# Patient Record
Sex: FEMALE | Race: WHITE | HISPANIC OR LATINO | Employment: OTHER | URBAN - METROPOLITAN AREA
[De-identification: names, ages, dates, MRNs, and addresses within clinical notes are randomized per-mention and may not be internally consistent; named-entity substitution may affect disease eponyms.]

---

## 2017-03-29 ENCOUNTER — GENERIC CONVERSION - ENCOUNTER (OUTPATIENT)
Dept: OTHER | Facility: OTHER | Age: 72
End: 2017-03-29

## 2017-04-03 ENCOUNTER — LAB CONVERSION - ENCOUNTER (OUTPATIENT)
Dept: OTHER | Facility: OTHER | Age: 72
End: 2017-04-03

## 2017-04-03 LAB
A/G RATIO (HISTORICAL): 1.4 (CALC) (ref 1–2.5)
ALBUMIN SERPL BCP-MCNC: 4.2 G/DL (ref 3.6–5.1)
ALP SERPL-CCNC: 69 U/L (ref 33–130)
ALT SERPL W P-5'-P-CCNC: 10 U/L (ref 6–29)
AST SERPL W P-5'-P-CCNC: 15 U/L (ref 10–35)
BASOPHILS # BLD AUTO: 0.4 %
BASOPHILS # BLD AUTO: 33 CELLS/UL (ref 0–200)
BILIRUB SERPL-MCNC: 0.7 MG/DL (ref 0.2–1.2)
BUN SERPL-MCNC: 12 MG/DL (ref 7–25)
BUN/CREA RATIO (HISTORICAL): NORMAL (CALC) (ref 6–22)
CALCIUM SERPL-MCNC: 9.1 MG/DL (ref 8.6–10.4)
CHLORIDE SERPL-SCNC: 98 MMOL/L (ref 98–110)
CO2 SERPL-SCNC: 30 MMOL/L (ref 20–31)
CREAT SERPL-MCNC: 0.64 MG/DL (ref 0.6–0.93)
DEPRECATED RDW RBC AUTO: 14.7 % (ref 11–15)
EGFR AFRICAN AMERICAN (HISTORICAL): 103 ML/MIN/1.73M2
EGFR-AMERICAN CALC (HISTORICAL): 89 ML/MIN/1.73M2
EOSINOPHIL # BLD AUTO: 0.4 %
EOSINOPHIL # BLD AUTO: 33 CELLS/UL (ref 15–500)
GAMMA GLOBULIN (HISTORICAL): 3.1 G/DL (CALC) (ref 1.9–3.7)
GLUCOSE (HISTORICAL): 89 MG/DL (ref 65–99)
HCT VFR BLD AUTO: 46.4 % (ref 35–45)
HGB BLD-MCNC: 15 G/DL (ref 11.7–15.5)
LYMPHOCYTES # BLD AUTO: 2509 CELLS/UL (ref 850–3900)
LYMPHOCYTES # BLD AUTO: 30.6 %
MCH RBC QN AUTO: 27 PG (ref 27–33)
MCHC RBC AUTO-ENTMCNC: 32.3 G/DL (ref 32–36)
MCV RBC AUTO: 83.9 FL (ref 80–100)
MONOCYTES # BLD AUTO: 394 CELLS/UL (ref 200–950)
MONOCYTES (HISTORICAL): 4.8 %
NEUTROPHILS # BLD AUTO: 5232 CELLS/UL (ref 1500–7800)
NEUTROPHILS # BLD AUTO: 63.8 %
PLATELET # BLD AUTO: 210 THOUSAND/UL (ref 140–400)
PMV BLD AUTO: 8.6 FL (ref 7.5–12.5)
POTASSIUM SERPL-SCNC: 4.5 MMOL/L (ref 3.5–5.3)
RBC # BLD AUTO: 5.53 MILLION/UL (ref 3.8–5.1)
SODIUM SERPL-SCNC: 136 MMOL/L (ref 135–146)
TOTAL PROTEIN (HISTORICAL): 7.3 G/DL (ref 6.1–8.1)
WBC # BLD AUTO: 8.2 THOUSAND/UL (ref 3.8–10.8)

## 2017-04-04 ENCOUNTER — LAB CONVERSION - ENCOUNTER (OUTPATIENT)
Dept: OTHER | Facility: OTHER | Age: 72
End: 2017-04-04

## 2017-04-04 LAB
A/G RATIO (HISTORICAL): 1.4 (CALC) (ref 1–2.5)
ALBUMIN SERPL BCP-MCNC: 4.2 G/DL (ref 3.6–5.1)
ALP SERPL-CCNC: 69 U/L (ref 33–130)
ALT SERPL W P-5'-P-CCNC: 10 U/L (ref 6–29)
AST SERPL W P-5'-P-CCNC: 15 U/L (ref 10–35)
BASOPHILS # BLD AUTO: 0.4 %
BASOPHILS # BLD AUTO: 33 CELLS/UL (ref 0–200)
BILIRUB SERPL-MCNC: 0.7 MG/DL (ref 0.2–1.2)
BUN SERPL-MCNC: 12 MG/DL (ref 7–25)
BUN/CREA RATIO (HISTORICAL): NORMAL (CALC) (ref 6–22)
CALCIUM SERPL-MCNC: 9.1 MG/DL (ref 8.6–10.4)
CARCINOEMBRYONIC ANTIGEN (HISTORICAL): <0.5 NG/ML
CHLORIDE SERPL-SCNC: 98 MMOL/L (ref 98–110)
CO2 SERPL-SCNC: 30 MMOL/L (ref 20–31)
CREAT SERPL-MCNC: 0.64 MG/DL (ref 0.6–0.93)
DEPRECATED RDW RBC AUTO: 14.7 % (ref 11–15)
EGFR AFRICAN AMERICAN (HISTORICAL): 103 ML/MIN/1.73M2
EGFR-AMERICAN CALC (HISTORICAL): 89 ML/MIN/1.73M2
EOSINOPHIL # BLD AUTO: 0.4 %
EOSINOPHIL # BLD AUTO: 33 CELLS/UL (ref 15–500)
GAMMA GLOBULIN (HISTORICAL): 3.1 G/DL (CALC) (ref 1.9–3.7)
GLUCOSE (HISTORICAL): 89 MG/DL (ref 65–99)
HCT VFR BLD AUTO: 46.4 % (ref 35–45)
HGB BLD-MCNC: 15 G/DL (ref 11.7–15.5)
LYMPHOCYTES # BLD AUTO: 2509 CELLS/UL (ref 850–3900)
LYMPHOCYTES # BLD AUTO: 30.6 %
MCH RBC QN AUTO: 27 PG (ref 27–33)
MCHC RBC AUTO-ENTMCNC: 32.3 G/DL (ref 32–36)
MCV RBC AUTO: 83.9 FL (ref 80–100)
MONOCYTES # BLD AUTO: 394 CELLS/UL (ref 200–950)
MONOCYTES (HISTORICAL): 4.8 %
NEUTROPHILS # BLD AUTO: 5232 CELLS/UL (ref 1500–7800)
NEUTROPHILS # BLD AUTO: 63.8 %
PLATELET # BLD AUTO: 210 THOUSAND/UL (ref 140–400)
PMV BLD AUTO: 8.6 FL (ref 7.5–12.5)
POTASSIUM SERPL-SCNC: 4.5 MMOL/L (ref 3.5–5.3)
RBC # BLD AUTO: 5.53 MILLION/UL (ref 3.8–5.1)
SODIUM SERPL-SCNC: 136 MMOL/L (ref 135–146)
TOTAL PROTEIN (HISTORICAL): 7.3 G/DL (ref 6.1–8.1)
WBC # BLD AUTO: 8.2 THOUSAND/UL (ref 3.8–10.8)

## 2017-04-07 ENCOUNTER — TRANSCRIBE ORDERS (OUTPATIENT)
Dept: ADMINISTRATIVE | Facility: HOSPITAL | Age: 72
End: 2017-04-07

## 2017-04-07 ENCOUNTER — ALLSCRIPTS OFFICE VISIT (OUTPATIENT)
Dept: OTHER | Facility: OTHER | Age: 72
End: 2017-04-07

## 2017-04-07 DIAGNOSIS — C18.9 MALIGNANT NEOPLASM OF COLON, UNSPECIFIED PART OF COLON (HCC): Primary | ICD-10-CM

## 2017-05-23 ENCOUNTER — LAB CONVERSION - ENCOUNTER (OUTPATIENT)
Dept: OTHER | Facility: OTHER | Age: 72
End: 2017-05-23

## 2017-05-23 LAB
25(OH)D3 SERPL-MCNC: 54 NG/ML (ref 30–100)
TSH SERPL DL<=0.05 MIU/L-ACNC: 2.63 MIU/L (ref 0.4–4.5)

## 2017-05-26 ENCOUNTER — GENERIC CONVERSION - ENCOUNTER (OUTPATIENT)
Dept: OTHER | Facility: OTHER | Age: 72
End: 2017-05-26

## 2017-06-28 ENCOUNTER — ALLSCRIPTS OFFICE VISIT (OUTPATIENT)
Dept: OTHER | Facility: OTHER | Age: 72
End: 2017-06-28

## 2017-06-28 DIAGNOSIS — Z12.31 ENCOUNTER FOR SCREENING MAMMOGRAM FOR MALIGNANT NEOPLASM OF BREAST: ICD-10-CM

## 2017-06-29 ENCOUNTER — GENERIC CONVERSION - ENCOUNTER (OUTPATIENT)
Dept: OTHER | Facility: OTHER | Age: 72
End: 2017-06-29

## 2017-07-26 LAB
A/G RATIO (HISTORICAL): 1.3 (CALC) (ref 1–2.5)
ALBUMIN SERPL BCP-MCNC: 4 G/DL (ref 3.6–5.1)
ALP SERPL-CCNC: 67 U/L (ref 33–130)
ALT SERPL W P-5'-P-CCNC: 10 U/L (ref 6–29)
AST SERPL W P-5'-P-CCNC: 16 U/L (ref 10–35)
BILIRUB SERPL-MCNC: 0.7 MG/DL (ref 0.2–1.2)
BUN SERPL-MCNC: 13 MG/DL (ref 7–25)
BUN/CREA RATIO (HISTORICAL): ABNORMAL (CALC) (ref 6–22)
CALCIUM SERPL-MCNC: 9.3 MG/DL (ref 8.6–10.4)
CHLORIDE SERPL-SCNC: 97 MMOL/L (ref 98–110)
CHOLEST SERPL-MCNC: 212 MG/DL (ref 125–200)
CHOLEST/HDLC SERPL: 3.3 (CALC)
CO2 SERPL-SCNC: 30 MMOL/L (ref 20–31)
CREAT SERPL-MCNC: 0.64 MG/DL (ref 0.6–0.93)
EGFR AFRICAN AMERICAN (HISTORICAL): 103 ML/MIN/1.73M2
EGFR-AMERICAN CALC (HISTORICAL): 89 ML/MIN/1.73M2
GAMMA GLOBULIN (HISTORICAL): 3 G/DL (CALC) (ref 1.9–3.7)
GLUCOSE (HISTORICAL): 85 MG/DL (ref 65–99)
HDLC SERPL-MCNC: 65 MG/DL
LDL CHOLESTEROL (HISTORICAL): 125 MG/DL (CALC)
NON-HDL-CHOL (CHOL-HDL) (HISTORICAL): 147 MG/DL (CALC)
POTASSIUM SERPL-SCNC: 4.1 MMOL/L (ref 3.5–5.3)
SODIUM SERPL-SCNC: 134 MMOL/L (ref 135–146)
TOTAL PROTEIN (HISTORICAL): 7 G/DL (ref 6.1–8.1)
TRIGL SERPL-MCNC: 112 MG/DL

## 2017-07-28 ENCOUNTER — GENERIC CONVERSION - ENCOUNTER (OUTPATIENT)
Dept: OTHER | Facility: OTHER | Age: 72
End: 2017-07-28

## 2017-09-18 ENCOUNTER — HOSPITAL ENCOUNTER (OUTPATIENT)
Dept: MAMMOGRAPHY | Facility: HOSPITAL | Age: 72
Discharge: HOME | End: 2017-09-18
Payer: COMMERCIAL

## 2017-09-18 DIAGNOSIS — Z12.31 ENCOUNTER FOR SCREENING MAMMOGRAM FOR MALIGNANT NEOPLASM OF BREAST: ICD-10-CM

## 2017-09-18 PROCEDURE — G0202 SCR MAMMO BI INCL CAD: HCPCS

## 2017-10-02 DIAGNOSIS — M85.80 OTHER SPECIFIED DISORDERS OF BONE DENSITY AND STRUCTURE, UNSPECIFIED SITE: ICD-10-CM

## 2017-10-03 ENCOUNTER — LAB CONVERSION - ENCOUNTER (OUTPATIENT)
Dept: OTHER | Facility: OTHER | Age: 72
End: 2017-10-03

## 2017-10-03 LAB
A/G RATIO (HISTORICAL): 1.5 (CALC) (ref 1–2.5)
ALBUMIN SERPL BCP-MCNC: 4.2 G/DL (ref 3.6–5.1)
ALP SERPL-CCNC: 71 U/L (ref 33–130)
ALT SERPL W P-5'-P-CCNC: 13 U/L (ref 6–29)
AST SERPL W P-5'-P-CCNC: 17 U/L (ref 10–35)
BASOPHILS # BLD AUTO: 0.4 %
BASOPHILS # BLD AUTO: 34 CELLS/UL (ref 0–200)
BILIRUB SERPL-MCNC: 0.7 MG/DL (ref 0.2–1.2)
BUN SERPL-MCNC: 10 MG/DL (ref 7–25)
BUN/CREA RATIO (HISTORICAL): ABNORMAL (CALC) (ref 6–22)
CALCIUM SERPL-MCNC: 9.1 MG/DL (ref 8.6–10.4)
CARCINOEMBRYONIC ANTIGEN (HISTORICAL): <0.5 NG/ML
CHLORIDE SERPL-SCNC: 96 MMOL/L (ref 98–110)
CO2 SERPL-SCNC: 31 MMOL/L (ref 20–31)
CREAT SERPL-MCNC: 0.62 MG/DL (ref 0.6–0.93)
DEPRECATED RDW RBC AUTO: 13.3 % (ref 11–15)
EGFR AFRICAN AMERICAN (HISTORICAL): 104 ML/MIN/1.73M2
EGFR-AMERICAN CALC (HISTORICAL): 90 ML/MIN/1.73M2
EOSINOPHIL # BLD AUTO: 0.4 %
EOSINOPHIL # BLD AUTO: 34 CELLS/UL (ref 15–500)
GAMMA GLOBULIN (HISTORICAL): 2.8 G/DL (CALC) (ref 1.9–3.7)
GLUCOSE (HISTORICAL): 89 MG/DL (ref 65–99)
HCT VFR BLD AUTO: 45.6 % (ref 35–45)
HGB BLD-MCNC: 14.6 G/DL (ref 11.7–15.5)
LYMPHOCYTES # BLD AUTO: 2168 CELLS/UL (ref 850–3900)
LYMPHOCYTES # BLD AUTO: 25.5 %
MCH RBC QN AUTO: 26.4 PG (ref 27–33)
MCHC RBC AUTO-ENTMCNC: 32 G/DL (ref 32–36)
MCV RBC AUTO: 82.6 FL (ref 80–100)
MONOCYTES # BLD AUTO: 493 CELLS/UL (ref 200–950)
MONOCYTES (HISTORICAL): 5.8 %
NEUTROPHILS # BLD AUTO: 5772 CELLS/UL (ref 1500–7800)
NEUTROPHILS # BLD AUTO: 67.9 %
PLATELET # BLD AUTO: 200 THOUSAND/UL (ref 140–400)
PMV BLD AUTO: 10.4 FL (ref 7.5–12.5)
POTASSIUM SERPL-SCNC: 4.1 MMOL/L (ref 3.5–5.3)
RBC # BLD AUTO: 5.52 MILLION/UL (ref 3.8–5.1)
SODIUM SERPL-SCNC: 135 MMOL/L (ref 135–146)
TOTAL PROTEIN (HISTORICAL): 7 G/DL (ref 6.1–8.1)
WBC # BLD AUTO: 8.5 THOUSAND/UL (ref 3.8–10.8)

## 2017-10-06 DIAGNOSIS — C18.9 MALIGNANT NEOPLASM OF COLON (HCC): ICD-10-CM

## 2017-10-09 ENCOUNTER — HOSPITAL ENCOUNTER (OUTPATIENT)
Dept: CT IMAGING | Facility: HOSPITAL | Age: 72
Discharge: HOME | End: 2017-10-09
Attending: INTERNAL MEDICINE
Payer: COMMERCIAL

## 2017-10-09 DIAGNOSIS — C18.9 MALIGNANT NEOPLASM OF COLON (HCC): ICD-10-CM

## 2017-10-09 PROCEDURE — 74177 CT ABD & PELVIS W/CONTRAST: CPT

## 2017-10-09 PROCEDURE — 71260 CT THORAX DX C+: CPT

## 2017-10-09 RX ADMIN — IOHEXOL 100 ML: 350 INJECTION, SOLUTION INTRAVENOUS at 08:53

## 2017-10-12 ENCOUNTER — ALLSCRIPTS OFFICE VISIT (OUTPATIENT)
Dept: OTHER | Facility: OTHER | Age: 72
End: 2017-10-12

## 2017-10-13 NOTE — PROGRESS NOTES
Assessment  1  Malignant neoplasm of colon (153 9) (C18 9)    Plan  Malignant neoplasm of colon    · (1) CBC/PLT/DIFF; Status:Active; Requested FCF:[id number]; Perform:[facility]; NWT:07OQO9030;MYVYBRH; For:Malignant neoplasm of colon; Ordered By:Davonte Shipman;   · (1) CEA; Status:Active; Requested VLY:59EPQ3057; Perform:[facility]; KJE:27XIT1445;NVIOPHV; For:Malignant neoplasm of colon; Ordered By:Davonte Shipman;   · (1) COMPREHENSIVE METABOLIC PANEL; Status:Active; Requested MXT:12WFI2257; Perform:[facility]; UWM:80RDL1023;OYJHLCN; For:Malignant neoplasm of colon; Ordered By:Davonte Shipman;   · Follow-up visit in 6 months Evaluation and Treatment  Follow-up  Status: Complete   Done: 25RPD5560   Ordered; For: Malignant neoplasm of colon; Ordered By: Bettie Reyes Performed:  Due: 41TGL4853; Last Updated By: Edgar Menezes; 10/12/2017 12:04:42 PM    Discussion/Summary  Discussion Summary: This is a pleasant 77-year-old female who was recently diagnosed with colon cancer and anemia  She had her tumor resected and got adjuvant chemotherapy for 6 months, which she completed on 4/13/15  She is now doing well  She has no evidence of recurrent disease  She did have a PET/CT scan in July of 2015 to evaluate pulmonary nodules, they were not hypermetabolic  Her last imaging again showed they were stable  I will see her back with blood work in 6 months  Counseling Documentation With Imm: The patient was counseled regarding diagnostic results,-instructions for management,-prognosis,-patient and family education  Goals and Barriers: The patient has the current Goals: Monitoring for recurrence of colon cancer  Ultimate goal is prolong survival  The patent has the current Barriers: None  Patient's Capacity to Self-Care: Patient is able to Self-Care  Medication SE Review and Pt Understands Tx: Possible side effects of new medications were reviewed with the patient/guardian today   The treatment plan was reviewed with the patient/guardian  The patient/guardian understands and agrees with the treatment plan      Chief Complaint  Chief Complaint Free Text Note Form: Stage III colon cancer status post resection and adjuvant chemotherapy with Xelox in [place]      History of Present Illness  Previous Therapy:   Adjuvant chemotherapy and surgery, adjuvant treatment completed in April 2015       Current Therapy: Observation       Interval History: The patient returns for followup visit  Overall she is doing well  She has a good energy level with an ECOG performance status of zero  She has a good appetite and her weight has been stable  Her most recent colonoscopy showed no evidence of any synchronous lesions  Her last CT scan did not show any progression of any nodules  He is now thought to be benign  As far as symptoms are concerned She denies fevers, chills, CP, SOB, N/V, diarrhea, and her 14 point review of systems today was negative  Her blood work is all normal with a normal CEA  Review of Systems  Complete-Female:  As stated in the history of present illness otherwise her 14 point review of systems today was negative  Constitutional: No fever, no chills, feels well, no tiredness, no recent weight gain or weight loss  Eyes: No complaints of eye pain, no red eyes, no eyesight problems, no discharge, no dry eyes, no itching of eyes  ENT: no complaints of earache, no loss of hearing, no nose bleeds, no nasal discharge, no sore throat, no hoarseness  Cardiovascular: No complaints of slow heart rate, no fast heart rate, no chest pain, no palpitations, no leg claudication, no lower extremity edema  Respiratory: No complaints of shortness of breath, no wheezing, no cough, no SOB on exertion, no orthopnea, no PND  Gastrointestinal: No complaints of abdominal pain, no constipation, no nausea or vomiting, no diarrhea, no bloody stools     Genitourinary: No complaints of dysuria, no incontinence, no pelvic pain, no dysmenorrhea, no vaginal discharge or bleeding  Musculoskeletal: No complaints of arthralgias, no myalgias, no joint swelling or stiffness, no limb pain or swelling  Integumentary: No complaints of skin rash or lesions, no itching, no skin wounds, no breast pain or lump  Neurological: No complaints of headache, no confusion, no convulsions, no numbness, no dizziness or fainting, no tingling, no limb weakness, no difficulty walking  Psychiatric: Not suicidal, no sleep disturbance, no anxiety or depression, no change in personality, no emotional problems  Endocrine: No complaints of proptosis, no hot flashes, no muscle weakness, no deepening of the voice, no feelings of weakness  Hematologic/Lymphatic: No complaints of swollen glands, no swollen glands in the neck, does not bleed easily, does not bruise easily  Active Problems  1  Abnormal mammogram of left breast (793 80) (R92 8)   2  Anxiety (300 00) (F41 9)   3  Colonoscopy (Fiberoptic) Screening   4  Diverticulosis (562 10) (K57 90)   5  Encounter for screening mammogram for malignant neoplasm of breast (V76 12)   (Z12 31)   6  Hyperlipidemia (272 4) (E78 5)   7  Hypertension (401 9) (I10)   8  Hypothyroidism (244 9) (E03 9)   9  Internal hemorrhoids (455 0) (K64 8)   10  Iron deficiency anemia (280 9) (D50 9)   11  Lung nodule (793 11) (R91 1)   12  Malignant neoplasm of colon (153 9) (C18 9)   13  Need for prophylactic vaccination and inoculation against influenza (V04 81) (Z23)   14  Need for vaccination with 13-polyvalent pneumococcal conjugate vaccine (V03 82) (Z23)   15  Neuropathy (355 9) (G62 9)   16  Obesity (278 00) (E66 9)   17  Osteopenia (733 90) (M85 80)   18  Other screening mammogram (V76 12) (Z12 31)   19  Screening for osteoporosis (V82 81) (Z13 820)   20  Vitamin D deficiency (268 9) (E55 9)    Past Medical History  1  History of colon cancer (V10 05) (Z85 038)   2  History of diarrhea (V12 79) (Z87 898)   3   History of Helicobacter infection (V12 09) (Z86 19)   4  History of nausea (V12 79) (Z87 898)   5  History of viral gastroenteritis (V12 09) (Z86 19)   6  Need for prophylactic vaccination and inoculation against influenza (V04 81) (Z23)  Active Problems And Past Medical History Reviewed: The active problems and past medical history were reviewed and updated today  Positive for hypothyroidism      Surgical History  1  History of Biopsy Breast Percutaneous Needle Core   2  History of Colon Surgery   3  History of Diagnostic Esophagogastroduodenoscopy   4  History of Hernia Repair   5  History of Partial Colectomy   6  History of Tonsillectomy  Surgical History Reviewed: The surgical history was reviewed and updated today  Recent surgery for colon cancer      Family History  Mother    1  Denied: Family history of Alcoholism and drug addiction in family   2  Denied: Family history of Anxiety and depression   3  Denied: Family history of Colon cancer   4  Denied: Family history of Crohn's disease without complication, unspecified   gastrointestinal tract location   5  Denied: Family history of liver disease   6  Family history of Hypertension (V17 49)   7  Family history of Mother  At Age 68  Father    6  Family history of Alcoholism   9  Family history of Alzheimer Disease   10  Denied: Family history of Anxiety and depression   11  Denied: Family history of Colon cancer   12  Denied: Family history of Crohn's disease without complication, unspecified    gastrointestinal tract location   15  Family history of Family Health Status Siblings ___ Living Sisters   15  Denied: Family history of liver disease   13  Family history of Father  At Age 80  Child    12  Denied: Family history of Alcoholism and drug addiction in family   16  Denied: Family history of Anxiety and depression  Sibling    25  Denied: Family history of Alcoholism and drug addiction in family   23  Denied: Family history of Anxiety and depression  Sister    21  Family history of Family Health Status Siblings 3  Living Sisters   24  Family history of Hyperlipidemia  Family History Reviewed: The family history was reviewed and updated today  Social History   · Denied: History of Alcohol Use (History)   · Denied: History of Exercise Habits   ·    · Never A Smoker   · Occupation: Retired   · Denied: History of Using Intravenous Drugs  Social History Reviewed: The social history was reviewed and updated today  Does not smoke does not drink is       Current Meds   1  Calcium 1000 + D 1000-800 MG-UNIT Oral Tablet; Take 1 tablet daily; Therapy: 18JWI2769 to (Last RE:31ANG3272) Ordered   2  Garlic 5074 MG Oral Capsule; TAKE AS DIRECTED; Therapy: 84XUE9398 to (Last Lindsay Arch)  Requested for: 93JTP9821 Ordered   3  HydroCHLOROthiazide 25 MG Oral Tablet; Take 1 tablet daily; Therapy: 40WPT4297 to (Evaluate:10Jan2018)  Requested for: 38ZIK9449; Last   Rx:93Ocw3399 Ordered   4  Levothyroxine Sodium 75 MCG Oral Tablet; TAKE 1 TABLET BY MOUTH ONCE DAILY; Therapy: 58TSB1499 to (Evaluate:23Mar2014); Last Rx:28Mar2013 Ordered  Medication List Reviewed: The medication list was reviewed and updated today  Allergies  1  No Known Drug Allergies    Vitals  Vital Signs    Recorded: 25KYA1462 11:05AM   Temperature 96 4 F   Heart Rate 77   Respiration 16   Systolic 613   Diastolic 80   Height 5 ft 0 5 in   Weight 162 lb    BMI Calculated 31 12   BSA Calculated 1 72   O2 Saturation 97   Pain Scale 0     Physical Exam    Constitutional   General appearance: No acute distress, well appearing and well nourished  Eyes   Conjunctiva and lids: No swelling, erythema or discharge  Pupils and irises: Equal, round and reactive to light  Ears, Nose, Mouth, and Throat   External inspection of ears and nose: Normal     Nasal mucosa, septum, and turbinates: Normal without edema or erythema  Oropharynx: Normal with no erythema, edema, exudate or lesions  Pulmonary   Respiratory effort: No increased work of breathing or signs of respiratory distress  Auscultation of lungs: Clear to auscultation  Cardiovascular   Palpation of heart: Normal PMI, no thrills  Auscultation of heart: Normal rate and rhythm, normal S1 and S2, without murmurs  Examination of extremities for edema and/or varicosities: Normal     Carotid pulses: Normal     Abdomen   Abdomen: Non-tender, no masses  Liver and spleen: No hepatomegaly or splenomegaly  Lymphatic   Palpation of lymph nodes in neck: No lymphadenopathy  Musculoskeletal   Gait and station: Normal     Digits and nails: Normal without clubbing or cyanosis  Inspection/palpation of joints, bones, and muscles: Normal     Skin   Skin and subcutaneous tissue: Normal without rashes or lesions  Neurologic   Cranial nerves: Cranial nerves 2-12 intact  Sensation: No sensory loss  Psychiatric   Orientation to person, place, and time: Normal     Mood and affect: Normal          Results/Data  * CT CHEST ABDOMEN PELVIS W CONTRAST [id number] 08:33AM Ruth Gonzales    Order Number: [id number]    - Patient Instructions: To schedule this appointment, please contact Central Scheduling at [phone]  Testing to be done @@ [address] [place] [address] Name Result Flag Reference   CT CHEST ABDOMEN PELVIS W CONTRAST (Report)     CT CHEST, ABDOMEN AND PELVIS WITH IV CONTRAST     INDICATION: C18 9: Malignant neoplasm of colon, unspecified (this clinical history is taken directly from the electronic ordering system)  COMPARISON: Chest CT performed April 18, 2016  CT of the chest, abdomen and pelvis performed July 16, 2015  PET CT scan performed July 29, 2015  TECHNIQUE: CT examination of the chest, abdomen and pelvis was performed  In addition to portal venous phase postcontrast scanning through the abdomen and pelvis, delayed phase postcontrast scanning was performed through the upper abdominal viscera  Reformatted images were created in axial, sagittal, and coronal planes  Radiation dose length product (DLP) for this visit: 999 mGy-cm   This examination, like all CT scans performed in the [facility], was performed utilizing techniques to minimize radiation dose exposure, including the use of iterative    reconstruction and automated exposure control  IV Contrast: 100 mL of iohexol (OMNIPAQUE)      Enteric Contrast: Enteric contrast was administered  FINDINGS:     CHEST     LUNGS: Spiculated density in the medial left lung apex measuring 2 1 x 1 0 cm is unchanged in size and CT configuration when compared as far back as July 16, 2015  The finding has a linear appearance and was negative on PET CT scanning of July 29, 2015  This finding is most consistent with scarring  A 4 mm subpleural nodule in the lateral right costophrenic angle on image 49 a 3 mm nodular focus of pleural thickening along the major fissure in the right mid chest on image 31 of series 4 and a    punctate calcified right upper lobe granuloma are all unchanged from July 2015 and may be considered benign  No new or suspicious pulmonary nodule or mass is identified  No tracheal or endobronchial lesion is seen  PLEURA: Unremarkable  HEART/GREAT VESSELS: Unremarkable for patient's age  MEDIASTINUM AND PEE: Unremarkable  CHEST WALL AND LOWER NECK:    Normal      ABDOMEN     LIVER/BILIARY TREE: Unremarkable  GALLBLADDER: No calcified gallstones  No pericholecystic inflammatory change  SPLEEN: Unremarkable  PANCREAS: Unremarkable  ADRENAL GLANDS: Unremarkable  KIDNEYS/URETERS: Small cortical cyst in the midportion of the left kidney is unchanged from previous examination  Kidneys are otherwise unremarkable  STOMACH AND BOWEL: There has been right hemicolectomy  Anastomotic staple line noted in the right upper quadrant   Few scattered sigmoid diverticula are noted without evidence of acute diverticulitis  Stomach and bowel appear otherwise unremarkable  APPENDIX: Surgically absent  ABDOMINOPELVIC CAVITY: No ascites or free intraperitoneal air  No lymphadenopathy  VESSELS: Unremarkable for patient's age  PELVIS     REPRODUCTIVE ORGANS: Unremarkable for patient's age  URINARY BLADDER: Unremarkable  ABDOMINAL WALL/INGUINAL REGIONS: Postsurgical changes  Otherwise unremarkable  OSSEOUS STRUCTURES: No acute fracture or destructive osseous lesion  IMPRESSION:     No CT evidence of residual, recurrent, or metastatic tumor in the chest, abdomen or pelvis  Linear pulmonary parenchymal scarring in the medial left lung apex and scattered tiny noncalcified pulmonary nodules, unchanged from as far back as July 2015, may be considered benign  Workstation performed: [id number]     Signed by:   Vanessa Jiménez MD   10/12/17     (1) COMPREHENSIVE METABOLIC PANEL 00TBY2251 21:28JI Davonte De Leon     Test Name Result Flag Reference   GLUCOSE 89 mg/dL  65-99   Fasting reference interval   UREA NITROGEN (BUN) 10 mg/dL  7-25   CREATININE 0 62 mg/dL  0 60-0 93   For patients >52years of age, the reference limit  for Creatinine is approximately 13% higher for people  identified as -American  eGFR NON-AFR   AMERICAN 90 mL/min/1 73m2  > OR = 60   eGFR AFRICAN AMERICAN 104 mL/min/1 73m2  > OR = 60   BUN/CREATININE RATIO   2-52   NOT APPLICABLE (calc)   SODIUM 135 mmol/L  135-146   POTASSIUM 4 1 mmol/L  3 5-5 3   CHLORIDE 96 mmol/L L    CARBON DIOXIDE 31 mmol/L  20-31   CALCIUM 9 1 mg/dL  8 6-10 4   PROTEIN, TOTAL 7 0 g/dL  6 1-8 1   ALBUMIN 4 2 g/dL  3 6-5 1   GLOBULIN 2 8 g/dL (calc)  1 9-3 7   ALBUMIN/GLOBULIN RATIO 1 5 (calc)  1 0-2 5   BILIRUBIN, TOTAL 0 7 mg/dL  0 2-1 2   ALKALINE PHOSPHATASE 71 U/L     AST 17 U/L  10-35   ALT 13 U/L  6-29     (1) CBC/PLT/DIFF 61RIC3921 09:58AM Davonte De Leon     Test Name Result Flag Reference   WHITE BLOOD CELL COUNT 8 5 Thousand/uL  3 8-10 8   RED BLOOD CELL COUNT 5 52 Million/uL H 3 80-5 10   HEMOGLOBIN 14 6 g/dL  11 7-15 5   HEMATOCRIT 45 6 % H 35 0-45 0   MCV 82 6 fL  80 0-100 0   MCH 26 4 pg L 27 0-33 0   MCHC 32 0 g/dL  32 0-36 0   RDW 13 3 %  11 0-15 0   PLATELET COUNT 558 Thousand/uL  140-400   ABSOLUTE NEUTROPHILS 5772 cells/uL  9230-2306   ABSOLUTE LYMPHOCYTES 2168 cells/uL  850-3900   ABSOLUTE MONOCYTES 493 cells/uL  200-950   ABSOLUTE EOSINOPHILS 34 cells/uL     ABSOLUTE BASOPHILS 34 cells/uL  0-200   NEUTROPHILS 67 9 %     LYMPHOCYTES 25 5 %     MONOCYTES 5 8 %     EOSINOPHILS 0 4 %     BASOPHILS 0 4 %     MPV 10 4 fL  7 5-12 5     (1) CEA 02Oct2017 09:58AM Davonte De Leon   REPORT COMMENT:  FASTING:YES     Test Name Result Flag Reference   CEA <0 5 ng/mL     Non-Smoker: <2 5  Smoker:     <5 0        This test was performed using the Siemens   chemiluminescent method  Values obtained from  different assay methods cannot be used  interchangeably  CEA levels, regardless of  value, should not be interpreted as absolute  evidence of the presence or absence of disease  * MAMMO SCREENING BILATERAL W CAD 18Sep2017 10:10AM Harsh Gandhi Order Number: [id number]    - Patient Instructions: To schedule this appointment, please contact Central Scheduling at [phone]  Do not wear any perfume, powder, lotion or deodorant on breast or underarm area  Please bring your doctors order, referral (if needed) and insurance information with you on the day of the test  Failure to bring this information may result in this test being rescheduled  Arrive 15 minutes prior to your appointment time to register  On the day of your test, please bring any prior mammogram or breast studies with you that were not performed at a [facility]  Failure to bring prior exams may result in your test needing to be rescheduled       Test Name Result Flag Reference   MAMMO SCREENING BILATERAL W CAD (Report)     Patient History:   Patient is postmenopausal, has history of colorectal cancer at    age 71, and is nulliparous  Family history of breast cancer at age 36 in paternal cousin  Patient has never smoked  Patient's BMI is 26 3  Reason for exam: screening, asymptomatic  Mammo Screening Bilateral W CAD: September 18, 2017 - Check In #:   [de-identified]   Bilateral CC and MLO view(s) were taken  Technologist: LUCIEN Guevara (R)(M)   Prior study comparison: October 6, 2015, bilateral digital    screening mammogram performed at [place] [address]  The breast tissue is heterogeneously dense, potentially limiting    the sensitivity of mammography  Patient risk, included in this    report, assists in determining the appropriate screening regimen    (such as 3-D mammography or the inclusion of automated breast    ultrasound or MRI)  3-D mammography may also remain indicated as    screening  No new dominant soft tissue mass, architectural    distortion or suspicious calcifications are noted  The skin and    nipple structures are within normal limits  Benign appearing    calcifications are noted  No mammographic evidence of malignancy  No    significant changes when compared with prior studies  ACR BI-RADSï¾® Assessments: BiRad:2 - Benign     Recommendation:   Routine screening mammogram of both breasts in 1 year  Analyzed by CAD     The patient is scheduled in a reminder system for screening    mammography  8-10% of cancers will be missed on mammography  Management of a    palpable abnormality must be based on clinical grounds  Patients   will be notified of their results via letter from our facility  Accredited by [employer] of [employer] and FDA       Transcription Location: LUCIEN Mack 98: [id number]     Risk Value(s):   Tyrer-Cuzick 10 Year: 3 100%, Tyrer-Cuzick Lifetime: 4 100%,    Myriad Table: 2 6%, KORY 5 Year: 0 8%, NCI Lifetime: 2 0%   Signed by:   Tiffany Keller Catrachito Duran MD   9/18/17     Health Management  Malignant neoplasm of colon   COLONOSCOPY; every 3 years; Last 54QQK4775; Next Due: 11Jan2019; Active  Other screening mammogram   Digital Bilateral Screening Mammogram With CAD; every 1 year; Last 69VJV7974; Next Due:  58RLN4429; Overdue  Health Maintenance   Medicare Annual Wellness Visit; every 1 year; Next Due: 73UVU5900; Overdue    Future Appointments    Date/Time Provider Specialty Site   04/13/2018 11:00 AM NADEEN Nayak   Hematology [facility]cology [facility] [place]   10/23/2017 03:00 PM Natasha Torres MD Internal Medicine [facility] INTERNAL MED     Signatures   Electronically signed by : NADEEN Rae ; Oct 12 2017 12:05PM EST                       (Author)

## 2017-10-16 ENCOUNTER — ALLSCRIPTS OFFICE VISIT (OUTPATIENT)
Dept: OTHER | Facility: OTHER | Age: 72
End: 2017-10-16

## 2017-10-23 ENCOUNTER — ALLSCRIPTS OFFICE VISIT (OUTPATIENT)
Dept: OTHER | Facility: OTHER | Age: 72
End: 2017-10-23

## 2017-10-24 NOTE — PROGRESS NOTES
Assessment  1  Hypothyroidism (244 9) (E03 9)   2  Hyperlipidemia (272 4) (E78 5)   3  Osteopenia (733 90) (M85 80)   4  Hypertension (401 9) (I10)   · enalapril made her sick    Plan  Hyperlipidemia    · (1) LIPID PANEL, FASTING; Status:Active; Requested for:02Apr2018;   Hypothyroidism    · (1) TSH; Status:Active; Requested for:02Apr2018;   Osteopenia    · Begin or continue regular aerobic exercise  Gradually work up to at least 3 sessions of 30 minutes  of exercise a week ; Status:Complete;   Done: 22VRY5963 03:19PM   · Continue with our present treatment plan ; Status:Complete;   Done: 57IWE2493 03:19PM   · We recommend that you get 400 IU of Vitamin D each day ; Status:Complete;   Done: 44QAZ5015  03:19PM  Vitamin D deficiency    · (1) VITAMIN D 25-HYDROXY; Status:Active; Requested for:02Apr2018; Discussion/Summary  Discussion Summary:   HTN  BP stable, on HCTZ only  Hypothyroid  Adequate replacement, refills from [place]  Hyperlipidemia  Lipids slightly elevated, off statin  Hx of colon cancer, lung nodule  CT stable, sees Dr Roly Hdz  Repeat colonoscopy in 2019  Neuropathy stable  Osteopenia  Continue Ca+D  Bone density due, will do it next year due to insurance  HM  Mammogram and flu vaccine updated  up in 6 months or prn  Counseling Documentation With Imm: The patient was counseled regarding instructions for management,-- risk factor reductions,-- impressions  Chief Complaint  Chief Complaint Chronic Condition [facility]: Patient is here today for follow up of chronic conditions described in HPI  History of Present Illness  HPI: Mrs Ruben Mario feels well, no complaints  checks her BP daily at home, usually 120/70s  hurt her ankle a few weeks ago, while walking in the woods  Denies any pain or swelling  Hypothyroidism (Follow-Up): The patient reports doing well  The patient is currently asymptomatic  Associated symptoms: no myalgias-- and-- no arthralgias     Medications:  the patient is adherent to her medication regimen  Disease management:  the patient is doing well with her goals  Hyperlipidemia (Follow-Up): The patient states her hyperlipidemia has been stable since the last visit  Symptoms: The patient is currently asymptomatic  Medications: the patient is adherent with her medication regimen  Hypertension (Follow-Up): The patient states she has been doing well with her blood pressure control since the last visit  Symptoms: The patient is currently asymptomatic  Medications: the patient is adherent with her medication regimen  Disease Management: the patient is doing well with her blood pressure goals  Review of Systems  Complete-Female:   Constitutional: not feeling poorly-- and-- not feeling tired  Eyes: no eyesight problems  ENT: no nasal discharge  Cardiovascular: no chest pain-- and-- no palpitations  Respiratory: no shortness of breath-- and-- no cough  Gastrointestinal: no abdominal pain-- and-- no constipation  Genitourinary: no dysuria  Musculoskeletal: no arthralgias-- and-- no myalgias  Integumentary: no rashes  Neurological: no headache-- and-- no dizziness  Psychiatric: no sleep disturbances  no feelings of weakness   Hematologic/Lymphatic: no tendency for easy bruising  Active Problems  1  Abnormal mammogram of left breast (793 80) (R92 8)   2  Colonoscopy (Fiberoptic) Screening   3  Diverticulosis (562 10) (K57 90)   4  Encounter for screening mammogram for malignant neoplasm of breast (V76 12) (Z12 31)   5  Hyperlipidemia (272 4) (E78 5)   6  Hypertension (401 9) (I10)   7  Hypothyroidism (244 9) (E03 9)   8  Internal hemorrhoids (455 0) (K64 8)   9  Iron deficiency anemia (280 9) (D50 9)   10  Lung nodule (793 11) (R91 1)   11  Malignant neoplasm of colon (153 9) (C18 9)   12  Need for prophylactic vaccination and inoculation against influenza (V04 81) (Z23)   13   Need for vaccination with 13-polyvalent pneumococcal conjugate vaccine (V03 82) (Z23)   14  Neuropathy (355 9) (G62 9)   15  Obesity (278 00) (E66 9)   16  Osteopenia (733 90) (M85 80)   17  Other screening mammogram (V76 12) (Z12 31)   18  Screening for osteoporosis (V82 81) (Z13 820)   19  Vitamin D deficiency (268 9) (E55 9)    Past Medical History  1  History of Anxiety (300 00) (F41 9)   2  History of colon cancer (V10 05) (Z85 038)   3  History of diarrhea (V12 79) (Z87 898)   4  History of Helicobacter infection (V12 09) (Z86 19)   5  History of nausea (V12 79) (Z87 898)   6  History of viral gastroenteritis (V12 09) (Z86 19)   7  Need for prophylactic vaccination and inoculation against influenza (V04 81) (Z23)  Active Problems And Past Medical History Reviewed: The active problems and past medical history were reviewed and updated today  Surgical History  1  History of Biopsy Breast Percutaneous Needle Core   2  History of Colon Surgery   3  History of Diagnostic Esophagogastroduodenoscopy   4  History of Hernia Repair   5  History of Partial Colectomy   6  History of Tonsillectomy    Family History  Mother    1  Denied: Family history of Alcoholism and drug addiction in family   2  Denied: Family history of Anxiety and depression   3  Denied: Family history of Colon cancer   4  Denied: Family history of Crohn's disease without complication, unspecified gastrointestinal tract   location   5  Denied: Family history of liver disease   6  Family history of Hypertension (V17 49)   7  Family history of Mother  At Age 68  Father    6  Family history of Alcoholism   9  Family history of Alzheimer Disease   10  Denied: Family history of Anxiety and depression   11  Denied: Family history of Colon cancer   12  Denied: Family history of Crohn's disease without complication, unspecified gastrointestinal tract    location   15  Family history of Family Health Status Siblings ___ Living Sisters   15  Denied: Family history of liver disease   13   Family history of Father  At Age 80  Child    12  Denied: Family history of Alcoholism and drug addiction in family   16  Denied: Family history of Anxiety and depression  Sibling    25  Denied: Family history of Alcoholism and drug addiction in family   23  Denied: Family history of Anxiety and depression  Sister    21  Family history of Family Health Status Siblings 3  Living Sisters   24  Family history of Hyperlipidemia    Social History   · Denied: History of Alcohol Use (History)   · Denied: History of Exercise Habits   ·    · Never A Smoker   · Occupation: Retired   · Denied: History of Using Intravenous Drugs  Social History Reviewed: The social history was reviewed and is unchanged  Current Meds   1  Calcium 1000 + D 1000-800 MG-UNIT Oral Tablet; Take 1 tablet daily; Therapy: 03JGH4321 to (Last BE:00EAW4842) Ordered   2  Garlic 0331 MG Oral Capsule; TAKE AS DIRECTED; Therapy: 04ZFO2879 to (Last Diane Saas)  Requested for: 23JGR8887 Ordered   3  HydroCHLOROthiazide 25 MG Oral Tablet; Take 1 tablet daily; Therapy: 49IGN0072 to (Evaluate:10Jan2018)  Requested for: 59BUO0248; Last Rx:09Tdr3636   Ordered   4  Levothyroxine Sodium 75 MCG Oral Tablet; TAKE 1 TABLET BY MOUTH ONCE DAILY; Therapy: 67FQK7928 to (Evaluate:23Mar2014); Last Rx:28Mar2013 Ordered  Medication List Reviewed: The medication list was reviewed and updated today  Allergies  1  No Known Drug Allergies    Vitals  Vital Signs    Recorded: 01LYK9814 02:57PM   Temperature 97 8 F   Heart Rate 78   Respiration 18   Systolic 041   Diastolic 68   Height 5 ft 0 5 in   Weight 162 lb 4 oz   BMI Calculated 31 17   BSA Calculated 1 72   O2 Saturation 98     Physical Exam    Constitutional   General appearance: No acute distress, well appearing and well nourished  appears healthy,-- comfortable-- and-- clothing appropriate  Head and Face   Head and face: Normal     Eyes   Pupils and irises: Equal, round, reactive to light      Ears, Nose, Mouth, and Throat External inspection of ears and nose: Normal     Otoscopic examination: Tympanic membranes translucent with normal light reflex  Canals patent without erythema  Neck   Neck: Supple, symmetric, trachea midline, no masses  Pulmonary   Respiratory effort: No increased work of breathing or signs of respiratory distress  Auscultation of lungs: Clear to auscultation  Cardiovascular   Auscultation of heart: Normal rate and rhythm, normal S1 and S2, no murmurs  Examination of extremities for edema and/or varicosities: Normal     Abdomen   Abdomen: Non-tender, no masses  Lymphatic   Palpation of lymph nodes in neck: No lymphadenopathy  Musculoskeletal   Gait and station: Normal     Neurologic   Cortical function: Normal mental status  Psychiatric   Orientation to person, place, and time: Normal     Mood and affect: Normal        Results/Data  PHQ-2 Adult Depression Screening 23Oct2017 02:58PM Rafael Pinedo     Test Name Result Flag Reference   PHQ-2 Adult Depression Score 0     Over the last two weeks, how often have you been bothered by any of the following problems? Little interest or pleasure in doing things: Not at all - 0  Feeling down, depressed, or hopeless: Not at all - 0   PHQ-2 Adult Depression Screening Negative       (1) COMPREHENSIVE METABOLIC PANEL 47CPV9413 91:88UL Davonte Hatfield     Test Name Result Flag Reference   GLUCOSE 89 mg/dL  65-99   Fasting reference interval   UREA NITROGEN (BUN) 10 mg/dL  7-25   CREATININE 0 62 mg/dL  0 60-0 93   For patients >52years of age, the reference limit  for Creatinine is approximately 13% higher for people  identified as -American  eGFR NON-AFR   AMERICAN 90 mL/min/1 73m2  > OR = 60   eGFR AFRICAN AMERICAN 104 mL/min/1 73m2  > OR = 60   BUN/CREATININE RATIO   8-17   NOT APPLICABLE (calc)   SODIUM 135 mmol/L  135-146   POTASSIUM 4 1 mmol/L  3 5-5 3   CHLORIDE 96 mmol/L L    CARBON DIOXIDE 31 mmol/L  20-31   CALCIUM 9 1 mg/dL  8 6-10 4 PROTEIN, TOTAL 7 0 g/dL  6 1-8 1   ALBUMIN 4 2 g/dL  3 6-5 1   GLOBULIN 2 8 g/dL (calc)  1 9-3 7   ALBUMIN/GLOBULIN RATIO 1 5 (calc)  1 0-2 5   BILIRUBIN, TOTAL 0 7 mg/dL  0 2-1 2   ALKALINE PHOSPHATASE 71 U/L     AST 17 U/L  10-35   ALT 13 U/L  6-29     (1) CBC/PLT/DIFF 02Oct2017 09:58AM Davonte Nieto     Test Name Result Flag Reference   WHITE BLOOD CELL COUNT 8 5 Thousand/uL  3 8-10 8   RED BLOOD CELL COUNT 5 52 Million/uL H 3 80-5 10   HEMOGLOBIN 14 6 g/dL  11 7-15 5   HEMATOCRIT 45 6 % H 35 0-45 0   MCV 82 6 fL  80 0-100 0   MCH 26 4 pg L 27 0-33 0   MCHC 32 0 g/dL  32 0-36 0   RDW 13 3 %  11 0-15 0   PLATELET COUNT 538 Thousand/uL  140-400   ABSOLUTE NEUTROPHILS 5772 cells/uL  3334-0638   ABSOLUTE LYMPHOCYTES 2168 cells/uL  850-3900   ABSOLUTE MONOCYTES 493 cells/uL  200-950   ABSOLUTE EOSINOPHILS 34 cells/uL     ABSOLUTE BASOPHILS 34 cells/uL  0-200   NEUTROPHILS 67 9 %     LYMPHOCYTES 25 5 %     MONOCYTES 5 8 %     EOSINOPHILS 0 4 %     BASOPHILS 0 4 %     MPV 10 4 fL  7 5-12 5     Health Management  Malignant neoplasm of colon   COLONOSCOPY; every 3 years; Last 29QSR1121; Next Due: 11Jan2019; Active  Other screening mammogram   Digital Bilateral Screening Mammogram With CAD; every 1 year; Last 02JLE0062; Next Due:  30RUZ5790; Overdue  Health Maintenance   Medicare Annual Wellness Visit; every 1 year; Next Due: 44BAF8606; Overdue    Future Appointments    Date/Time Provider Specialty Site   04/13/2018 11:00 AM NADEEN Anthony   Hematology Oncology [facility] [place]   04/16/2018 02:00 PM Christian Torres MD Internal Medicine [facility] INTERNAL MED     Signatures   Electronically signed by : Domonique Merlos MD; Oct 23 2017  3:19PM EST                       (Author)

## 2018-01-11 NOTE — RESULT NOTES
Verified Results  (1) LIPID PANEL, FASTING 59Pav4749 09:51AM Pranav Quiroga     Test Name Result Flag Reference   CHOLESTEROL, TOTAL 212 mg/dL H 125-200   HDL CHOLESTEROL 65 mg/dL  > OR = 46   TRIGLICERIDES 914 mg/dL  <150   LDL-CHOLESTEROL 125 mg/dL (calc)  <130   Desirable range <100 mg/dL for patients with CHD or  diabetes and <70 mg/dL for diabetic patients with  known heart disease  CHOL/HDLC RATIO 3 3 (calc)  < OR = 5 0   NON HDL CHOLESTEROL 147 mg/dL (calc)     Target for non-HDL cholesterol is 30 mg/dL higher than   LDL cholesterol target  (1) COMPREHENSIVE METABOLIC PANEL 50GGO7399 89:71KU Pranav Quiroga   REPORT COMMENT:  FASTING:YES     Test Name Result Flag Reference   GLUCOSE 85 mg/dL  65-99   Fasting reference interval   UREA NITROGEN (BUN) 13 mg/dL  7-25   CREATININE 0 64 mg/dL  0 60-0 93   For patients >52years of age, the reference limit  for Creatinine is approximately 13% higher for people  identified as -American  eGFR NON-AFR   AMERICAN 89 mL/min/1 73m2  > OR = 60   eGFR AFRICAN AMERICAN 103 mL/min/1 73m2  > OR = 60   BUN/CREATININE RATIO   8-63   NOT APPLICABLE (calc)   SODIUM 134 mmol/L L 135-146   POTASSIUM 4 1 mmol/L  3 5-5 3   CHLORIDE 97 mmol/L L    CARBON DIOXIDE 30 mmol/L  20-31   CALCIUM 9 3 mg/dL  8 6-10 4   PROTEIN, TOTAL 7 0 g/dL  6 1-8 1   ALBUMIN 4 0 g/dL  3 6-5 1   GLOBULIN 3 0 g/dL (calc)  1 9-3 7   ALBUMIN/GLOBULIN RATIO 1 3 (calc)  1 0-2 5   BILIRUBIN, TOTAL 0 7 mg/dL  0 2-1 2   ALKALINE PHOSPHATASE 67 U/L     AST 16 U/L  10-35   ALT 10 U/L  6-29

## 2018-01-11 NOTE — RESULT NOTES
Verified Results  (Q) TSH, 3RD GENERATION 08ATU3966 10:28AM Cherl Shaw     Test Name Result Flag Reference   TSH 2 63 mIU/L  0 40-4 50     *(Q) VITAMIN D, 25-HYDROXY, LC/MS/MS 39EMQ8989 10:28AM Cherl Shaw   REPORT COMMENT:  FASTING:YES     Test Name Result Flag Reference   VITAMIN D, 25-OH, TOTAL 54 ng/mL     Vitamin D Status         25-OH Vitamin D:     Deficiency:                    <20 ng/mL  Insufficiency:             20 - 29 ng/mL  Optimal:                 > or = 30 ng/mL     For 25-OH Vitamin D testing on patients on   D2-supplementation and patients for whom quantitation   of D2 and D3 fractions is required, the QuestAssureD(TM)  25-OH VIT D, (D2,D3), LC/MS/MS is recommended: order   code 61074 (patients >2yrs)  For more information on this test, go to:  http://[employer]/faq/PMH349  (This link is being provided for   informational/educational purposes only )

## 2018-01-12 NOTE — PROGRESS NOTES
Chief Complaint  pt is here for flu vaccine      Active Problems    1  Abnormal mammogram of left breast (793 80) (R92 8)   2  Anxiety (300 00) (F41 9)   3  Colonoscopy (Fiberoptic) Screening   4  Diverticulosis (562 10) (K57 90)   5  Encounter for screening mammogram for malignant neoplasm of breast (V76 12)   (Z12 31)   6  Hyperlipidemia (272 4) (E78 5)   7  Hypertension (401 9) (I10)   8  Hypothyroidism (244 9) (E03 9)   9  Internal hemorrhoids (455 0) (K64 8)   10  Iron deficiency anemia (280 9) (D50 9)   11  Lung nodule (793 11) (R91 1)   12  Malignant neoplasm of colon (153 9) (C18 9)   13  Need for prophylactic vaccination and inoculation against influenza (V04 81) (Z23)   14  Need for vaccination with 13-polyvalent pneumococcal conjugate vaccine (V03 82) (Z23)   15  Neuropathy (355 9) (G62 9)   16  Obesity (278 00) (E66 9)   17  Osteopenia (733 90) (M85 80)   18  Other screening mammogram (V76 12) (Z12 31)   19  Screening for osteoporosis (V82 81) (Z13 820)   20  Vitamin D deficiency (268 9) (E55 9)    Current Meds   1  Calcium 1000 + D 1000-800 MG-UNIT Oral Tablet; Take 1 tablet daily; Therapy: 41BBX9971 to (Last ES:44DCQ9344) Ordered   2  Garlic 3220 MG Oral Capsule; TAKE AS DIRECTED; Therapy: 87YMK0794 to ([facility])  Requested for: 44AMC5313 Ordered   3  HydroCHLOROthiazide 25 MG Oral Tablet; Take 1 tablet daily; Therapy: 30XKX2730 to (Evaluate:10Jan2018)  Requested for: 74PEM9480; Last   Rx:57Crr3870 Ordered   4  Levothyroxine Sodium 75 MCG Oral Tablet; TAKE 1 TABLET BY MOUTH ONCE DAILY; Therapy: 43MEW9414 to (Evaluate:23Mar2014); Last Rx:28Mar2013 Ordered    Allergies    1  No Known Drug Allergies    Plan  Need for prophylactic vaccination and inoculation against influenza    · Fluzone High-Dose 0 5 ML Intramuscular Suspension Prefilled Syringe    Future Appointments    Date/Time Provider Specialty Site   04/13/2018 11:00 AM NADEEN Barraza   Hematology Oncology [facility] [place]   10/23/2017 03:00 PM Ericka Torres MD Internal Medicine [address] [place] [address] INTERNAL [facility]   04/16/2018 02:00 PM Ericka Torres MD Internal Medicine [facility] INTERNAL MED     Signatures   Electronically signed by : Gelacio Solorio, ; Oct 16 2017  1:51PM EST                       (Co-author)    Electronically signed by : Asmita Sena MD; Oct 16 2017  5:32PM EST                       (Author)

## 2018-01-13 VITALS
BODY MASS INDEX: 30.63 KG/M2 | SYSTOLIC BLOOD PRESSURE: 136 MMHG | HEART RATE: 78 BPM | TEMPERATURE: 97.8 F | OXYGEN SATURATION: 98 % | WEIGHT: 162.25 LBS | RESPIRATION RATE: 18 BRPM | DIASTOLIC BLOOD PRESSURE: 68 MMHG | HEIGHT: 61 IN

## 2018-01-13 VITALS
HEIGHT: 61 IN | DIASTOLIC BLOOD PRESSURE: 80 MMHG | SYSTOLIC BLOOD PRESSURE: 160 MMHG | TEMPERATURE: 96.4 F | RESPIRATION RATE: 16 BRPM | WEIGHT: 162 LBS | BODY MASS INDEX: 30.58 KG/M2 | HEART RATE: 77 BPM | OXYGEN SATURATION: 97 %

## 2018-01-13 VITALS
OXYGEN SATURATION: 97 % | DIASTOLIC BLOOD PRESSURE: 96 MMHG | WEIGHT: 161.13 LBS | SYSTOLIC BLOOD PRESSURE: 160 MMHG | BODY MASS INDEX: 30.42 KG/M2 | TEMPERATURE: 98 F | RESPIRATION RATE: 18 BRPM | HEART RATE: 98 BPM | HEIGHT: 61 IN

## 2018-01-14 VITALS
OXYGEN SATURATION: 95 % | RESPIRATION RATE: 18 BRPM | BODY MASS INDEX: 31.15 KG/M2 | TEMPERATURE: 97.9 F | HEART RATE: 85 BPM | HEIGHT: 61 IN | WEIGHT: 165 LBS | DIASTOLIC BLOOD PRESSURE: 92 MMHG | SYSTOLIC BLOOD PRESSURE: 180 MMHG

## 2018-01-15 NOTE — RESULT NOTES
Verified Results  (1) VITAMIN B12 21Nov2016 10:40AM [facility]     Test Name Result Flag Reference   VITAMIN B12 957 pg/mL  200-1100     (1) LIPID PANEL, FASTING 98PSK7911 10:40AM [facility]     Test Name Result Flag Reference   NON HDL CHOLESTEROL 152 mg/dL (calc)     Target for non-HDL cholesterol is 30 mg/dL higher than   LDL cholesterol target  CHOL/HDLC RATIO 3 1 (calc)  < OR = 5 0   LDL-CHOLESTEROL 131 mg/dL (calc) H <130   Desirable range <100 mg/dL for patients with CHD or  diabetes and <70 mg/dL for diabetic patients with  known heart disease  TRIGLICERIDES 927 mg/dL  <150   HDL CHOLESTEROL 74 mg/dL  > OR = 46   CHOLESTEROL, TOTAL 226 mg/dL H 125-200     (1) T4, FREE 21Nov2016 10:40AM [facility]     Test Name Result Flag Reference   T4, FREE 1 2 ng/dL  0 8-1 8     (Q) TSH, 3RD GENERATION 21Nov2016 10:40AM [facility]     Test Name Result Flag Reference   TSH 2 74 mIU/L  0 40-4 50     *(Q) VITAMIN D, 25-HYDROXY, LC/MS/MS 21BSM5638 10:40AM [facility]   REPORT COMMENT:  ON HOLD-2 REQS  FASTING:YES AN UPDATE OR CORRECTION HAS BEEN MADE TO NAME     Test Name Result Flag Reference   VITAMIN D, 25-OH, TOTAL 52 ng/mL     Vitamin D Status         25-OH Vitamin D:     Deficiency:                    <20 ng/mL  Insufficiency:             20 - 29 ng/mL  Optimal:                 > or = 30 ng/mL     For 25-OH Vitamin D testing on patients on   D2-supplementation and patients for whom quantitation   of D2 and D3 fractions is required, the QuestAssureD(TM)  25-OH VIT D, (D2,D3), LC/MS/MS is recommended: order   code 29649 (patients >2yrs)  For more information on this test, go to:  http://[employer]/faq/NEY968  (This link is being provided for   informational/educational purposes only )

## 2018-01-18 NOTE — RESULT NOTES
Verified Results  (1) T4, FREE 11Apr2016 09:57AM [facility]     Test Name Result Flag Reference   T4, FREE 1 3 ng/dL  0 8-1 8     (1) COMPREHENSIVE METABOLIC PANEL 53YNF7578 66:27HI [facility]     Test Name Result Flag Reference   GLUCOSE 92 mg/dL  65-99   Fasting reference interval   UREA NITROGEN (BUN) 12 mg/dL  7-25   CREATININE 0 61 mg/dL  0 60-0 93   For patients >52years of age, the reference limit  for Creatinine is approximately 13% higher for people  identified as -American  eGFR NON-AFR  AMERICAN 91 mL/min/1 73m2  > OR = 60   eGFR AFRICAN AMERICAN 106 mL/min/1 73m2  > OR = 60   BUN/CREATININE RATIO   2-91   NOT APPLICABLE (calc)   SODIUM 134 mmol/L L 135-146   POTASSIUM 4 1 mmol/L  3 5-5 3   CHLORIDE 95 mmol/L L    CARBON DIOXIDE 28 mmol/L  19-30   CALCIUM 9 4 mg/dL  8 6-10 4   PROTEIN, TOTAL 7 3 g/dL  6 1-8 1   ALBUMIN 4 3 g/dL  3 6-5 1   GLOBULIN 3 0 g/dL (calc)  1 9-3 7   ALBUMIN/GLOBULIN RATIO 1 4 (calc)  1 0-2 5   BILIRUBIN, TOTAL 0 9 mg/dL  0 2-1 2   ALKALINE PHOSPHATASE 66 U/L     AST 16 U/L  10-35   ALT 11 U/L  6-29     (1) LIPID PANEL, FASTING 11Apr2016 09:57AM [facility]     Test Name Result Flag Reference   CHOLESTEROL, TOTAL 236 mg/dL H 125-200   HDL CHOLESTEROL 76 mg/dL  > OR = 46   TRIGLICERIDES 014 mg/dL  <150   LDL-CHOLESTEROL 140 mg/dL (calc) H <130   Desirable range <100 mg/dL for patients with CHD or  diabetes and <70 mg/dL for diabetic patients with  known heart disease  CHOL/HDLC RATIO 3 1 (calc)  < OR = 5 0   NON HDL CHOLESTEROL 160 mg/dL (calc) H    Target for non-HDL cholesterol is 30 mg/dL higher than   LDL cholesterol target       (1) CBC/PLT/DIFF 11Apr2016 09:57AM [facility]     Test Name Result Flag Reference   WHITE BLOOD CELL COUNT 7 1 Thousand/uL  3 8-10 8   RED BLOOD CELL COUNT 5 42 Million/uL H 3 80-5 10   HEMOGLOBIN 14 6 g/dL  11 7-15 5   HEMATOCRIT 45 5 % H 35 0-45 0   MCV 84 0 fL  80 0-100 0   MCH 26 9 pg L 27 0-33 0   MCHC 32 0 g/dL  32 0-36 0   RDW 14 4 %  11 0-15 0   PLATELET COUNT 828 Thousand/uL  140-400   MPV 8 4 fL  7 5-11 5   ABSOLUTE NEUTROPHILS 4537 cells/uL  6295-9118   ABSOLUTE LYMPHOCYTES 2052 cells/uL  850-3900   ABSOLUTE MONOCYTES 447 cells/uL  200-950   ABSOLUTE EOSINOPHILS 28 cells/uL     ABSOLUTE BASOPHILS 36 cells/uL  0-200   NEUTROPHILS 63 9 %     LYMPHOCYTES 28 9 %     MONOCYTES 6 3 %     EOSINOPHILS 0 4 %     BASOPHILS 0 5 %       (Q) TSH, 3RD GENERATION 11Apr2016 09:57AM [facility]     Test Name Result Flag Reference   TSH 2 15 mIU/L  0 40-4 50     *(Q) VITAMIN D, 25-HYDROXY, LC/MS/MS 11Apr2016 09:57AM Christian [facility]   REPORT COMMENT:  FASTING:YES     Test Name Result Flag Reference   VITAMIN D, 25-OH, TOTAL 46 ng/mL     Vitamin D Status         25-OH Vitamin D:     Deficiency:                    <20 ng/mL  Insufficiency:             20 - 29 ng/mL  Optimal:                 > or = 30 ng/mL     For 25-OH Vitamin D testing on patients on   D2-supplementation and patients for whom quantitation   of D2 and D3 fractions is required, the QuestAssureD(TM)  25-OH VIT D, (D2,D3), LC/MS/MS is recommended: order   code 64578 (patients >2yrs)  For more information on this test, go to:  http://[employer]/faq/VRW450  (This link is being provided for   informational/educational purposes only )

## 2018-04-10 LAB
25(OH)D3 SERPL-MCNC: 64 NG/ML (ref 30–100)
ALBUMIN SERPL-MCNC: 4.4 G/DL (ref 3.6–5.1)
ALBUMIN/GLOB SERPL: 1.5 (CALC) (ref 1–2.5)
ALP SERPL-CCNC: 66 U/L (ref 33–130)
ALT SERPL-CCNC: 12 U/L (ref 6–29)
AST SERPL-CCNC: 16 U/L (ref 10–35)
BASOPHILS # BLD AUTO: 30 CELLS/UL (ref 0–200)
BASOPHILS NFR BLD AUTO: 0.4 %
BILIRUB SERPL-MCNC: 0.9 MG/DL (ref 0.2–1.2)
BUN SERPL-MCNC: 10 MG/DL (ref 7–25)
BUN/CREAT SERPL: ABNORMAL (CALC) (ref 6–22)
CALCIUM SERPL-MCNC: 9.5 MG/DL (ref 8.6–10.4)
CEA SERPL-MCNC: <0.5 NG/ML
CHLORIDE SERPL-SCNC: 97 MMOL/L (ref 98–110)
CHOLEST SERPL-MCNC: 236 MG/DL
CHOLEST/HDLC SERPL: 3.3 (CALC)
CO2 SERPL-SCNC: 29 MMOL/L (ref 20–31)
CREAT SERPL-MCNC: 0.71 MG/DL (ref 0.6–0.93)
EOSINOPHIL # BLD AUTO: 22 CELLS/UL (ref 15–500)
EOSINOPHIL NFR BLD AUTO: 0.3 %
ERYTHROCYTE [DISTWIDTH] IN BLOOD BY AUTOMATED COUNT: 13.2 % (ref 11–15)
GLOBULIN SER CALC-MCNC: 3 G/DL (CALC) (ref 1.9–3.7)
GLUCOSE SERPL-MCNC: 90 MG/DL (ref 65–99)
HCT VFR BLD AUTO: 47.1 % (ref 35–45)
HDLC SERPL-MCNC: 71 MG/DL
HGB BLD-MCNC: 15.3 G/DL (ref 11.7–15.5)
LDLC SERPL CALC-MCNC: 143 MG/DL (CALC)
LYMPHOCYTES # BLD AUTO: 2020 CELLS/UL (ref 850–3900)
LYMPHOCYTES NFR BLD AUTO: 27.3 %
MCH RBC QN AUTO: 27 PG (ref 27–33)
MCHC RBC AUTO-ENTMCNC: 32.5 G/DL (ref 32–36)
MCV RBC AUTO: 83.2 FL (ref 80–100)
MONOCYTES # BLD AUTO: 348 CELLS/UL (ref 200–950)
MONOCYTES NFR BLD AUTO: 4.7 %
NEUTROPHILS # BLD AUTO: 4980 CELLS/UL (ref 1500–7800)
NEUTROPHILS NFR BLD AUTO: 67.3 %
NONHDLC SERPL-MCNC: 165 MG/DL (CALC)
PLATELET # BLD AUTO: 252 THOUSAND/UL (ref 140–400)
PMV BLD REES-ECKER: 9.8 FL (ref 7.5–12.5)
POTASSIUM SERPL-SCNC: 4 MMOL/L (ref 3.5–5.3)
PROT SERPL-MCNC: 7.4 G/DL (ref 6.1–8.1)
RBC # BLD AUTO: 5.66 MILLION/UL (ref 3.8–5.1)
SL AMB EGFR AFRICAN AMERICAN: 98 ML/MIN/1.73M2
SL AMB EGFR NON AFRICAN AMERICAN: 84 ML/MIN/1.73M2
SODIUM SERPL-SCNC: 136 MMOL/L (ref 135–146)
TRIGL SERPL-MCNC: 108 MG/DL
TSH SERPL-ACNC: 3.26 MIU/L (ref 0.4–4.5)
WBC # BLD AUTO: 7.4 THOUSAND/UL (ref 3.8–10.8)

## 2018-04-13 ENCOUNTER — OFFICE VISIT (OUTPATIENT)
Dept: HEMATOLOGY ONCOLOGY | Facility: CLINIC | Age: 73
End: 2018-04-13
Payer: COMMERCIAL

## 2018-04-13 VITALS
WEIGHT: 158 LBS | RESPIRATION RATE: 16 BRPM | OXYGEN SATURATION: 87 % | SYSTOLIC BLOOD PRESSURE: 144 MMHG | TEMPERATURE: 97.8 F | HEART RATE: 74 BPM | DIASTOLIC BLOOD PRESSURE: 92 MMHG | BODY MASS INDEX: 31.85 KG/M2 | HEIGHT: 59 IN

## 2018-04-13 DIAGNOSIS — C18.8 OVERLAPPING MALIGNANT NEOPLASM OF COLON (HCC): Primary | ICD-10-CM

## 2018-04-13 PROCEDURE — 99214 OFFICE O/P EST MOD 30 MIN: CPT | Performed by: INTERNAL MEDICINE

## 2018-04-13 NOTE — PROGRESS NOTES
Hematology/Oncology Outpatient Follow- up Note  Lashay Horton 68 y o  female MRN: @ Encounter: [id number]        Date:  4/13/2018    Presenting Complaint/Diagnosis : Stage III colon cancer status post resection and adjuvant chemotherapy with Xelox in [place]       Previous Hematologic/ Oncologic History:      Adjuvant chemotherapy and surgery, adjuvant treatment completed in April 2015     Current Hematologic/ Oncologic Treatment:    Observation    Interval History:      The patient returns for followup visit  Overall she is doing well  She has a good energy level with an ECOG performance status of zero  She has a good appetite and her weight has been stable  Her most recent colonoscopy showed no evidence of any synchronous lesions  Her last CT scan October 2017 did not show any progression of any nodules  As far as symptoms are concerned She denies fevers, chills, CP, SOB, N/V, diarrhea, and her 14 point review of systems today was negative  Her blood work is all normal with a normal CEA  Test Results:    Imaging: No results found  Labs:   Lab Results   Component Value Date    WBC 8 5 10/02/2017    HGB 15 3 04/09/2018    HCT 47 1 (H) 04/09/2018    MCV 83 2 04/09/2018     04/09/2018     Lab Results   Component Value Date     10/02/2017    K 4 1 10/02/2017    CL 96 (L) 10/02/2017    CO2 31 10/02/2017    BUN 10 04/09/2018    CREATININE 0 71 04/09/2018    CALCIUM 9 5 04/09/2018    AST 17 10/02/2017    ALT 13 10/02/2017    ALKPHOS 71 10/02/2017    PROT 7 0 10/02/2017    BILITOT 0 7 10/02/2017         Lab Results   Component Value Date    CEA <0 5 04/09/2018         Lab Results   Component Value Date    DHPCMRQX03 375 11/21/2016         ROS: As stated in the history of present illness otherwise his 14 point review of systems today was negative        Active Problems:   Patient Active Problem List   Diagnosis    Diverticulosis    Hyperlipidemia    Hypertension    Hypothyroidism    Internal hemorrhoids    Iron deficiency anemia    Lung nodule    Malignant neoplasm of colon (HCC)    Neuropathy    Vitamin D deficiency    Osteopenia    Obesity    Abnormal mammogram of left breast       Past Medical History:   Past Medical History:   Diagnosis Date    Anxiety     resolved: Oct 23, 2017    [facility])     Disease of thyroid gland     hypothyroidism    History of Helicobacter infection     onset: 6/14    Hypertension        Surgical History:   Past Surgical History:   Procedure Laterality Date    BREAST BIOPSY      1990's normal     COLECTOMY      Partial     COLON SURGERY      right hemicolectomy 2014    COLON SURGERY      COLONOSCOPY W/ POLYPECTOMY      ESOPHAGOGASTRODUODENOSCOPY      Diagnostic - 6/14 in [facility]      right inguinal hernia repair 1989    IA COLONOSCOPY FLX DX W/COLLJ SPEC WHEN PFRMD N/A 1/11/2016    Procedure: COLONOSCOPY;  Surgeon: Demond Winter MD;  [facility]; Service: Gastroenterology    TONSILLECTOMY      as a child       Family History:    Family History   Problem Relation Age of Onset    Hypertension Mother     Alcohol abuse Father     Alzheimer's disease Father     Hyperlipidemia Sister      1 living sisters     No Known Problems Brother     No Known Problems Maternal Grandmother     No Known Problems Maternal Grandfather     No Known Problems Paternal Grandmother     No Known Problems Paternal Grandfather        Cancer-related family history is not on file      Social History:   Social History     Social History    Marital status: /Civil Union     Spouse name: N/A    Number of children: N/A    Years of education: N/A     Occupational History    Retired       Social History Main Topics    Smoking status: Never Smoker    Smokeless tobacco: Never Used    Alcohol use No    Drug use: No    Sexual activity: Not on file     Other Topics Concern    Not on file     Social History Narrative    Denied: History of exercise habits        Current Medications:   Current Outpatient Prescriptions   Medication Sig Dispense Refill    Calcium Carb-Cholecalciferol 1000-800 MG-UNIT TABS Take 1 tablet by mouth daily      Garlic 5022 MG CAPS Take 1,000 mg by mouth daily   hydrochlorothiazide (HYDRODIURIL) 25 mg tablet Take 25 mg by mouth daily   levothyroxine 75 mcg tablet Take 75 mcg by mouth daily   Multiple Vitamins-Minerals (MULTIVITAMIN WITH MINERALS) tablet Take 1 tablet by mouth daily  No current facility-administered medications for this visit  Allergies: No Known Allergies    Physical Exam:    Body surface area is 1 68 meters squared  Wt Readings from Last 3 Encounters:   04/13/18 71 7 kg (158 lb)   10/23/17 73 6 kg (162 lb 4 oz)   10/12/17 73 5 kg (162 lb)        Temp Readings from Last 3 Encounters:   04/13/18 97 8 °F (36 6 °C) (Tympanic)   10/23/17 97 8 °F (36 6 °C)   10/12/17 (!) 96 4 °F (35 8 °C)        BP Readings from Last 3 Encounters:   04/13/18 144/92   10/23/17 136/68   10/12/17 160/80         Pulse Readings from Last 3 Encounters:   04/13/18 74   10/23/17 78   10/12/17 77          Physical Exam     Constitutional   General appearance: No acute distress, well appearing and well nourished  Eyes   Conjunctiva and lids: No swelling, erythema or discharge  Pupils and irises: Equal, round and reactive to light  Ears, Nose, Mouth, and Throat   External inspection of ears and nose: Normal     Nasal mucosa, septum, and turbinates: Normal without edema or erythema  Oropharynx: Normal with no erythema, edema, exudate or lesions  Pulmonary   Respiratory effort: No increased work of breathing or signs of respiratory distress  Auscultation of lungs: Clear to auscultation  Cardiovascular   Palpation of heart: Normal PMI, no thrills  Auscultation of heart: Normal rate and rhythm, normal S1 and S2, without murmurs      Examination of extremities for edema and/or varicosities: Normal     Carotid pulses: Normal     Abdomen   Abdomen: Non-tender, no masses  Liver and spleen: No hepatomegaly or splenomegaly  Lymphatic   Palpation of lymph nodes in neck: No lymphadenopathy  Musculoskeletal   Gait and station: Normal     Digits and nails: Normal without clubbing or cyanosis  Inspection/palpation of joints, bones, and muscles: Normal     Skin   Skin and subcutaneous tissue: Normal without rashes or lesions  Neurologic   Cranial nerves: Cranial nerves 2-12 intact  Sensation: No sensory loss  Psychiatric   Orientation to person, place, and time: Normal     Mood and affect: Normal         Assessment / Plan: This is a pleasant 59-year-old female who was recently diagnosed with colon cancer and anemia  She had her tumor resected and got adjuvant chemotherapy for 6 months, which she completed on 4/13/15  She is now doing well  She has no evidence of recurrent disease  She did have a PET/CT scan in July of 2015 to evaluate pulmonary nodules, they were not hypermetabolic  Her last imaging again showed they were stable  I will see her back with blood work in 12 months  I will repeat imaging at that time  That will be her last scan  Goals and Barriers:  Current Goal:  Prolong Survival from Colon cancer  Barriers: None  Patient's Capacity to Self Care:  Patient able to self care  Portions of the record may have been created with voice recognition software   Occasional wrong word or "sound a like" substitutions may have occurred due to the inherent limitations of voice recognition software   Read the chart carefully and recognize, using context, where substitutions have occurred

## 2018-04-16 ENCOUNTER — OFFICE VISIT (OUTPATIENT)
Dept: INTERNAL MEDICINE CLINIC | Facility: CLINIC | Age: 73
End: 2018-04-16
Payer: COMMERCIAL

## 2018-04-16 VITALS
HEART RATE: 86 BPM | TEMPERATURE: 98 F | OXYGEN SATURATION: 96 % | HEIGHT: 59 IN | SYSTOLIC BLOOD PRESSURE: 138 MMHG | BODY MASS INDEX: 31.85 KG/M2 | WEIGHT: 158 LBS | RESPIRATION RATE: 18 BRPM | DIASTOLIC BLOOD PRESSURE: 88 MMHG

## 2018-04-16 DIAGNOSIS — E55.9 VITAMIN D DEFICIENCY: ICD-10-CM

## 2018-04-16 DIAGNOSIS — E78.49 OTHER HYPERLIPIDEMIA: ICD-10-CM

## 2018-04-16 DIAGNOSIS — E03.9 ACQUIRED HYPOTHYROIDISM: ICD-10-CM

## 2018-04-16 DIAGNOSIS — D50.9 IRON DEFICIENCY ANEMIA, UNSPECIFIED IRON DEFICIENCY ANEMIA TYPE: ICD-10-CM

## 2018-04-16 DIAGNOSIS — I10 ESSENTIAL HYPERTENSION: ICD-10-CM

## 2018-04-16 DIAGNOSIS — Z00.00 HEALTH MAINTENANCE EXAMINATION: ICD-10-CM

## 2018-04-16 DIAGNOSIS — E66.09 CLASS 1 OBESITY DUE TO EXCESS CALORIES WITHOUT SERIOUS COMORBIDITY WITH BODY MASS INDEX (BMI) OF 31.0 TO 31.9 IN ADULT: Primary | ICD-10-CM

## 2018-04-16 DIAGNOSIS — M85.89 OSTEOPENIA OF MULTIPLE SITES: ICD-10-CM

## 2018-04-16 DIAGNOSIS — Z12.31 ENCOUNTER FOR SCREENING MAMMOGRAM FOR BREAST CANCER: ICD-10-CM

## 2018-04-16 PROCEDURE — 3075F SYST BP GE 130 - 139MM HG: CPT | Performed by: INTERNAL MEDICINE

## 2018-04-16 PROCEDURE — 3008F BODY MASS INDEX DOCD: CPT | Performed by: INTERNAL MEDICINE

## 2018-04-16 PROCEDURE — 99214 OFFICE O/P EST MOD 30 MIN: CPT | Performed by: INTERNAL MEDICINE

## 2018-04-16 PROCEDURE — 3079F DIAST BP 80-89 MM HG: CPT | Performed by: INTERNAL MEDICINE

## 2018-04-16 PROCEDURE — 3725F SCREEN DEPRESSION PERFORMED: CPT | Performed by: INTERNAL MEDICINE

## 2018-04-16 PROCEDURE — G0439 PPPS, SUBSEQ VISIT: HCPCS | Performed by: INTERNAL MEDICINE

## 2018-04-16 RX ORDER — HYDROCHLOROTHIAZIDE 25 MG/1
25 TABLET ORAL DAILY
Qty: 90 TABLET | Refills: 1 | Status: SHIPPED | OUTPATIENT
Start: 2018-04-16 | End: 2018-10-22 | Stop reason: SDUPTHER

## 2018-04-16 NOTE — PROGRESS NOTES
HPI:  John Warner is a 68 y o  female here for her Subsequent Wellness Visit  Patient Active Problem List   Diagnosis    Diverticulosis    Other hyperlipidemia    Essential hypertension    Acquired hypothyroidism    Internal hemorrhoids    Iron deficiency anemia    Lung nodule    Malignant neoplasm of colon ([address] )    Neuropathy    Vitamin D deficiency    Osteopenia of multiple sites    Class 1 obesity due to excess calories without serious comorbidity with body mass index (BMI) of 31 0 to 31 9 in adult    Abnormal mammogram of left breast     Past Medical History:   Diagnosis Date    Anxiety     resolved: Oct 23, 2017    Colon cancer [facility])     Disease of thyroid gland     hypothyroidism    History of Helicobacter infection     onset: 6/14    Hypertension      Past Surgical History:   Procedure Laterality Date    BREAST BIOPSY      1990's normal     COLECTOMY      Partial     COLON SURGERY      right hemicolectomy 2014    COLON SURGERY      COLONOSCOPY W/ POLYPECTOMY      ESOPHAGOGASTRODUODENOSCOPY      Diagnostic - 6/14 in [address] [place] [address]      right inguinal hernia repair 1989    NE COLONOSCOPY FLX DX W/COLLJ SPEC WHEN PFRMD N/A 1/11/2016    Procedure: COLONOSCOPY;  Surgeon: Evelin Paris MD;  [facility];   Service: Gastroenterology    TONSILLECTOMY      as a child     Family History   Problem Relation Age of Onset    Hypertension Mother     Alcohol abuse Father     Alzheimer's disease Father     Hyperlipidemia Sister      1 living sisters     No Known Problems Brother     No Known Problems Maternal Grandmother     No Known Problems Maternal Grandfather     No Known Problems Paternal Grandmother     No Known Problems Paternal Grandfather      History   Smoking Status    Never Smoker   Smokeless Tobacco    Never Used     History   Alcohol Use No      History   Drug Use No       Review of Systems   Constitutional: Negative for appetite change and fatigue  HENT: Negative for congestion, ear pain and postnasal drip  Eyes: Negative for visual disturbance  Respiratory: Negative for cough and shortness of breath  Cardiovascular: Negative for chest pain and leg swelling  Gastrointestinal: Negative for abdominal pain, constipation and diarrhea  Genitourinary: Negative for dysuria, frequency and urgency  Musculoskeletal: Negative for arthralgias and myalgias  Skin: Negative for rash and wound  Neurological: Negative for dizziness, numbness and headaches  Hematological: Does not bruise/bleed easily  Psychiatric/Behavioral: Negative for confusion  The patient is not nervous/anxious  /88   Pulse 86   Temp 98 °F (36 7 °C)   Resp 18   Ht 4' 11 45" (1 51 m)   Wt 71 7 kg (158 lb)   SpO2 96%   BMI 31 43 kg/m²       Current Outpatient Prescriptions   Medication Sig Dispense Refill    Calcium Carb-Cholecalciferol 1000-800 MG-UNIT TABS Take 1 tablet by mouth daily      Garlic 0963 MG CAPS Take 1,000 mg by mouth daily   hydrochlorothiazide (HYDRODIURIL) 25 mg tablet Take 1 tablet (25 mg total) by mouth daily 90 tablet 1    levothyroxine 75 mcg tablet Take 75 mcg by mouth daily   Multiple Vitamins-Minerals (MULTIVITAMIN WITH MINERALS) tablet Take 1 tablet by mouth daily  No current facility-administered medications for this visit        No Known Allergies  Immunization History   Administered Date(s) Administered    Influenza Split High Dose Preservative Free IM 10/02/2013, 10/03/2015, 10/15/2016, 10/16/2017    Pneumococcal Conjugate 13-Valent 10/03/2015    Pneumococcal Polysaccharide PPV23 10/02/2012       Patient Care Team:  Nilay Newton MD as PCP - MD Norma Sheikh MD Loni Michaelis, MD      Medicare Screening Tests and Risk Assessments:  AWV Clinical     ISAR:   Previous hospitalizations?:  No       Once in a Lifetime Medicare Screening:       Medicare Screening Tests and Risk Assessment:   AAA Risk Assessment    Osteoporosis Risk Assessment     Female:  Yes   :  Yes     Low body weight (<127lbs):  No    Alcohol use:  No    PMHX of fractures:  No   HIV Risk Assessment        Drug and Alcohol Use:   Tobacco use    Cigarettes:  never smoker    Tobacco use duration    Tobacco Cessation Readiness    Alcohol use    Alcohol use:  never    Alcohol Treatment Readiness   Illicit Drug Use    Drug use:  never        Diet & Exercise:   Diet   What is your diet?:  No Added Salt   How many servings a day of the following:   Exercise        Cognitive Impairment Screening:   Depression screening preformed:  Yes Depression screen score:  0   Cognitive Impairment Screening    Do you have difficulty learning or retaining new information?:  No Do you have difficulty handling new tasks?:  No    Do you have difficulty with spatial ability and orientation?:  No   Do you have difficulty with language?:  No Do you have difficulty with behavior?:  No       Functional Ability/Level of Safety:   Hearing    Hearing difficulties:  No Bilateral:  normal   Hearing Impairment Assessment    Hearing status:  No impairment   Current Activities    Status:  unlimited ADL's, unlimited driving, unlimited IADL's, unlimited social activities   Help needed with the folllowing:    Using the phone:  No Transportation:  No   Shopping:  No Preparing Meals:  No   Doing Housework:  No Doing Laundry:  No    Managing Money:  No   ADL    Fall Risk   Have you fallen in the last 12 months?:  No Are you unsteady on your feet?:  No   Injury History       Home Safety:   Home Safety Risk Factors   Unfamilar with surroundings:  No Uneven floors:  No   Stairs or handrail saftey risk:  No Loose rugs:  No   Household clutter:  No Poor household lighting:  No   No grab bars in bathroom:  Yes Further evaluation needed:  No       Advanced Directives:   Advanced Directives    Living Will:  Yes Durable POA for healthcare:  No   Advanced directive:  No    Patient's End of Life Decisions    Reviewed with patient:  Yes        Urinary Incontinence:   Do you have urinary incontinence?:  No        Glaucoma:            Provider Screening     Preventative Screening/Counseling:   Cardiovascular Screening/Counseling:   (Labs Q5 years, EKG optional one-time)   General:  Screening Current           Diabetes Screening/Counseling:   (2 tests/year if Pre-Diabetes or 1 test/year if no Diabetes)   General:  Screening Current           Colorectal Cancer Screening/Counseling:   (FOBT Q1 yr; Flex Sig Q4 yrs or Q10 yrs after Screening Colonoscopy; Screening Colonoscpy Q2 yrs High Risk or Q10 yrs Low Risk; Barium Enema Q2 yrs High Risk or Q4 yrs Low Risk)   General:  Screening Current           Prostate Cancer Screening/Counseling:   (Annual)          Breast Cancer Screening/Counseling:   (Baseline Age 28 - 43; Annual Age 36+)   General:  Screening Current          Cervical Cancer Screening/Counseling:   (Annual for High Risk or Childbearing Age with Abnormal Pap in Last 3 yrs; Every 2 all others)   General:  Screening Not Indicated           Osteoporosis Screening/Counseling:   (Every 2 Yrs if at risk or more if medically necessary)    Counseling:  Calcium and Vitamin D Intake, Regular Weightbearing Exercise Due for: Studies:  DXA Appendicular         AAA Screening/Counseling:   (Once per Lifetime with risk factors)          Glaucoma Screening/Counseling:   (Annual)   General:  Screening Current          HIV Screening/Counseling:   (Voluntary; Once annually for high risk OR 3 times for Pregnancy at diagnosis of IUP; 3rd trimester; and at Labor         Hepatitis C Screening:             Immunizations:   Influenza (annual):   Influenza UTD This Year   Pneumococcal (Once in a Lifetime):  Lifetime Vaccine Completed   Zostavax (Medicare D Coverage, Pt >70 yo):  Vaccine Status Unknown   TD (Non-Medicare Wellness  Visit required):  Vaccine Status Unknown   Tdap (Non-Medicare Wellness Visit required):  Vaccine Status Unknown       Other Preventative Couseling (Non-Medicare Wellness Visit Required):   weight reduction was discussed, Increased physical activity counseling given       Referrals (Non-Medicare Wellness Visit Required):   Hematology/Oncology       Medical Equipment/Suppliers:           No exam data present      Assessment and Plan:  1  Class 1 obesity due to excess calories without serious comorbidity with body mass index (BMI) of 31 0 to 31 9 in adult     2  Health maintenance examination      Mammogram due in the fall  3  Iron deficiency anemia, unspecified iron deficiency anemia type     4  Acquired hypothyroidism     5  Essential hypertension  hydrochlorothiazide (HYDRODIURIL) 25 mg tablet    Basic metabolic panel    CBC and differential   6  Osteopenia of multiple sites  DXA bone density spine hip and pelvis   7  Other hyperlipidemia  Lipid panel    TSH, 3rd generation   8  Vitamin D deficiency  DXA bone density spine hip and pelvis   9   Encounter for screening mammogram for breast cancer  Mammo screening bilateral w cad       Health Maintenance Due   Topic Date Due    Hepatitis C Screening  1945    SLP PLAN OF CARE  1945    Depression Screening PHQ-9  01/22/1957    DTaP,Tdap,and Td Vaccines (1 - Tdap) 01/22/1966    Fall Risk  01/22/2010    Urinary Incontinence Screening  01/22/2010    GLAUCOMA SCREENING 67+ YR  01/22/2012

## 2018-04-16 NOTE — PROGRESS NOTES
Assessment/Plan:    Class 1 obesity due to excess calories without serious comorbidity with body mass index (BMI) of 31 0 to 31 9 in adult  Lost 4 lbs since last visit  Eating healthier, start regular exercise  Essential hypertension  BP stable on HCTZ  Acquired hypothyroidism  Adequately replaced  Other hyperlipidemia  Lipids worse, discussed low-fat diet  Consider restarting statin  Osteopenia of multiple sites  Bone density due, on supplements  Malignant neoplasm of colon ([address] )  Colonoscopy due 2019  Diagnoses and all orders for this visit:    Class 1 obesity due to excess calories without serious comorbidity with body mass index (BMI) of 31 0 to 31 9 in adult    Health maintenance examination  Comments:  Mammogram due in the fall  Iron deficiency anemia, unspecified iron deficiency anemia type    Acquired hypothyroidism    Essential hypertension  -     hydrochlorothiazide (HYDRODIURIL) 25 mg tablet; Take 1 tablet (25 mg total) by mouth daily  -     Basic metabolic panel; Future  -     CBC and differential; Future    Osteopenia of multiple sites  -     DXA bone density spine hip and pelvis; Future    Other hyperlipidemia  -     Lipid panel; Future  -     TSH, 3rd generation; Future    Vitamin D deficiency  -     DXA bone density spine hip and pelvis; Future    Encounter for screening mammogram for breast cancer  -     Mammo screening bilateral w cad; Future          Subjective:      Patient ID: Oral Juan is a 68 y o  female  Mrs Ellis Bansal is feeling well, no complaints  She ate a lot of pork and tapas while in [place]  She reports drinking very little red wine, thinks it increased her blood pressure and stop drinking alcohol after that  She has been eating healthier since her return from vacation  Denies any chest pain, shortness of breath dizziness or other symptoms        The following portions of the patient's history were reviewed and updated as appropriate: allergies, current medications, past medical history, past social history and problem list     Review of Systems   Constitutional: Negative for appetite change and fatigue  HENT: Negative for congestion, ear pain and postnasal drip  Eyes: Negative for visual disturbance  Respiratory: Negative for cough and shortness of breath  Cardiovascular: Negative for chest pain and leg swelling  Gastrointestinal: Negative for abdominal pain, constipation and diarrhea  Genitourinary: Negative for dysuria, frequency and urgency  Musculoskeletal: Negative for arthralgias and myalgias  Skin: Negative for rash and wound  Neurological: Negative for dizziness, numbness and headaches  Hematological: Does not bruise/bleed easily  Psychiatric/Behavioral: Negative for confusion  The patient is not nervous/anxious  Objective:      /88   Pulse 86   Temp 98 °F (36 7 °C)   Resp 18   Ht 4' 11 45" (1 51 m)   Wt 71 7 kg (158 lb)   SpO2 96%   BMI 31 43 kg/m²          Physical Exam   Constitutional: She is oriented to person, place, and time  She appears well-developed and well-nourished  HENT:   Head: Normocephalic and atraumatic  Nose: Nose normal    Eyes: Conjunctivae are normal  Pupils are equal, round, and reactive to light  Neck: Neck supple  Cardiovascular: Normal rate, regular rhythm and normal heart sounds  No edema  Pulmonary/Chest: Effort normal and breath sounds normal  She has no wheezes  She has no rales  Abdominal: Soft  Bowel sounds are normal    Neurological: She is alert and oriented to person, place, and time  Skin: Skin is warm  No rash noted  Psychiatric: She has a normal mood and affect  Her behavior is normal    Nursing note and vitals reviewed  Medications and lab results reviewed with patient

## 2018-09-01 ENCOUNTER — HOSPITAL ENCOUNTER (OUTPATIENT)
Dept: RADIOLOGY | Age: 73
Discharge: HOME | End: 2018-09-01
Payer: COMMERCIAL

## 2018-09-01 DIAGNOSIS — E55.9 VITAMIN D DEFICIENCY: ICD-10-CM

## 2018-09-01 DIAGNOSIS — M85.89 OSTEOPENIA OF MULTIPLE SITES: ICD-10-CM

## 2018-09-01 PROCEDURE — 77080 DXA BONE DENSITY AXIAL: CPT

## 2018-09-05 ENCOUNTER — TELEPHONE (OUTPATIENT)
Dept: INTERNAL MEDICINE CLINIC | Facility: CLINIC | Age: 73
End: 2018-09-05

## 2018-09-05 NOTE — TELEPHONE ENCOUNTER
----- Message from Mari Temple MD sent at 9/5/2018  3:48 PM EDT -----  Bone density shows osteopenia  Please continue your daily calcium at least 1200 mg and vitamin D3 at least 1000 units  Recommend to walk daily

## 2018-10-16 LAB
BASOPHILS # BLD AUTO: 44 CELLS/UL (ref 0–200)
BASOPHILS NFR BLD AUTO: 0.5 %
BUN SERPL-MCNC: 12 MG/DL (ref 7–25)
BUN/CREAT SERPL: ABNORMAL (CALC) (ref 6–22)
CALCIUM SERPL-MCNC: 9.2 MG/DL (ref 8.6–10.4)
CHLORIDE SERPL-SCNC: 97 MMOL/L (ref 98–110)
CHOLEST SERPL-MCNC: 217 MG/DL
CHOLEST/HDLC SERPL: 3 (CALC)
CO2 SERPL-SCNC: 31 MMOL/L (ref 20–32)
CREAT SERPL-MCNC: 0.62 MG/DL (ref 0.6–0.93)
EOSINOPHIL # BLD AUTO: 53 CELLS/UL (ref 15–500)
EOSINOPHIL NFR BLD AUTO: 0.6 %
ERYTHROCYTE [DISTWIDTH] IN BLOOD BY AUTOMATED COUNT: 13.2 % (ref 11–15)
GLUCOSE SERPL-MCNC: 85 MG/DL (ref 65–99)
HCT VFR BLD AUTO: 44.3 % (ref 35–45)
HDLC SERPL-MCNC: 73 MG/DL
HGB BLD-MCNC: 14.5 G/DL (ref 11.7–15.5)
LDLC SERPL CALC-MCNC: 125 MG/DL (CALC)
LYMPHOCYTES # BLD AUTO: 2561 CELLS/UL (ref 850–3900)
LYMPHOCYTES NFR BLD AUTO: 29.1 %
MCH RBC QN AUTO: 27 PG (ref 27–33)
MCHC RBC AUTO-ENTMCNC: 32.7 G/DL (ref 32–36)
MCV RBC AUTO: 82.5 FL (ref 80–100)
MONOCYTES # BLD AUTO: 440 CELLS/UL (ref 200–950)
MONOCYTES NFR BLD AUTO: 5 %
NEUTROPHILS # BLD AUTO: 5702 CELLS/UL (ref 1500–7800)
NEUTROPHILS NFR BLD AUTO: 64.8 %
NONHDLC SERPL-MCNC: 144 MG/DL (CALC)
PLATELET # BLD AUTO: 220 THOUSAND/UL (ref 140–400)
PMV BLD REES-ECKER: 10.2 FL (ref 7.5–12.5)
POTASSIUM SERPL-SCNC: 4.1 MMOL/L (ref 3.5–5.3)
RBC # BLD AUTO: 5.37 MILLION/UL (ref 3.8–5.1)
SL AMB EGFR AFRICAN AMERICAN: 104 ML/MIN/1.73M2
SL AMB EGFR NON AFRICAN AMERICAN: 89 ML/MIN/1.73M2
SODIUM SERPL-SCNC: 134 MMOL/L (ref 135–146)
TRIGL SERPL-MCNC: 86 MG/DL
TSH SERPL-ACNC: 2.56 MIU/L (ref 0.4–4.5)
WBC # BLD AUTO: 8.8 THOUSAND/UL (ref 3.8–10.8)

## 2018-10-22 ENCOUNTER — OFFICE VISIT (OUTPATIENT)
Dept: INTERNAL MEDICINE CLINIC | Facility: CLINIC | Age: 73
End: 2018-10-22
Payer: COMMERCIAL

## 2018-10-22 VITALS
TEMPERATURE: 98.4 F | DIASTOLIC BLOOD PRESSURE: 84 MMHG | WEIGHT: 158.8 LBS | OXYGEN SATURATION: 99 % | HEART RATE: 88 BPM | SYSTOLIC BLOOD PRESSURE: 178 MMHG | BODY MASS INDEX: 32.01 KG/M2 | RESPIRATION RATE: 18 BRPM | HEIGHT: 59 IN

## 2018-10-22 DIAGNOSIS — M85.89 OSTEOPENIA OF MULTIPLE SITES: ICD-10-CM

## 2018-10-22 DIAGNOSIS — E66.09 CLASS 1 OBESITY DUE TO EXCESS CALORIES WITHOUT SERIOUS COMORBIDITY WITH BODY MASS INDEX (BMI) OF 31.0 TO 31.9 IN ADULT: ICD-10-CM

## 2018-10-22 DIAGNOSIS — E03.9 ACQUIRED HYPOTHYROIDISM: ICD-10-CM

## 2018-10-22 DIAGNOSIS — Z71.9 HEALTH COUNSELING: ICD-10-CM

## 2018-10-22 DIAGNOSIS — Z23 NEED FOR INFLUENZA VACCINATION: ICD-10-CM

## 2018-10-22 DIAGNOSIS — I10 ESSENTIAL HYPERTENSION: Primary | ICD-10-CM

## 2018-10-22 DIAGNOSIS — E78.49 OTHER HYPERLIPIDEMIA: ICD-10-CM

## 2018-10-22 PROCEDURE — 1160F RVW MEDS BY RX/DR IN RCRD: CPT

## 2018-10-22 PROCEDURE — 1160F RVW MEDS BY RX/DR IN RCRD: CPT | Performed by: INTERNAL MEDICINE

## 2018-10-22 PROCEDURE — 3008F BODY MASS INDEX DOCD: CPT | Performed by: INTERNAL MEDICINE

## 2018-10-22 PROCEDURE — G0008 ADMIN INFLUENZA VIRUS VAC: HCPCS

## 2018-10-22 PROCEDURE — 90662 IIV NO PRSV INCREASED AG IM: CPT

## 2018-10-22 PROCEDURE — 4040F PNEUMOC VAC/ADMIN/RCVD: CPT

## 2018-10-22 PROCEDURE — 99214 OFFICE O/P EST MOD 30 MIN: CPT | Performed by: INTERNAL MEDICINE

## 2018-10-22 RX ORDER — HYDROCHLOROTHIAZIDE 25 MG/1
25 TABLET ORAL DAILY
Qty: 90 TABLET | Refills: 1 | Status: SHIPPED | OUTPATIENT
Start: 2018-10-22 | End: 2019-03-25 | Stop reason: SDUPTHER

## 2018-10-22 NOTE — PROGRESS NOTES
Assessment/Plan:    Essential hypertension  Repeat BP remains elevated  Home BP readings normal   Discussed low salt diet  She will monitor her BP at home for the next few days  On HCTZ only  Other hyperlipidemia  Lipids improved  Acquired hypothyroidism  Stable  Lung nodule  CT scan scheduled April 2019  Malignant neoplasm of colon ([address] )  Repeat colonoscopy in 2019  Osteopenia of multiple sites  Stable, on daily supplements  Bone density updated  Class 1 obesity due to excess calories without serious comorbidity with body mass index (BMI) of 31 0 to 31 9 in adult  No weight change  Diagnoses and all orders for this visit:    Essential hypertension  -     hydrochlorothiazide (HYDRODIURIL) 25 mg tablet; Take 1 tablet (25 mg total) by mouth daily    Osteopenia of multiple sites    Class 1 obesity due to excess calories without serious comorbidity with body mass index (BMI) of 31 0 to 31 9 in adult    Other hyperlipidemia  -     Lipid panel; Future    Acquired hypothyroidism    Need for influenza vaccination  -     influenza vaccine, [id number], high-dose, PF 0 5 mL, for patients 65 yr+ (FLUZONE HIGH-DOSE)    Health counseling  Comments:  Flu vaccine today  Mammogram scheduled  Follow up in 6 months or as needed  Subjective:      Patient ID: Wali Quinones is a 68 y o  female  Mrs Pipe Sams feels well  She reports that her blood pressure is probably high because she had pizza and anchovies the past few days  She checks her blood pressure regularly at home  Systolic usually 056 to 457L, diastolic 60 to 67N  She denies any headache, dizziness, chest pain or shortness of breath  She takes her medication regularly  She watches her diet, does not exercise regularly        The following portions of the patient's history were reviewed and updated as appropriate: allergies, current medications, past medical history, past social history and problem list     Review of Systems Constitutional: Negative for appetite change and fatigue  HENT: Negative for congestion, ear pain, hearing loss and postnasal drip  Eyes: Negative for visual disturbance  Respiratory: Negative for cough and shortness of breath  Cardiovascular: Negative for chest pain and leg swelling  Gastrointestinal: Negative for abdominal pain, constipation and diarrhea  Genitourinary: Negative for dysuria, frequency and urgency  Musculoskeletal: Negative for arthralgias and myalgias  Skin: Negative for rash and wound  Neurological: Negative for dizziness, numbness and headaches  Hematological: Does not bruise/bleed easily  Psychiatric/Behavioral: Negative for confusion  The patient is not nervous/anxious  Objective:      BP (!) 178/84   Pulse 88   Temp 98 4 °F (36 9 °C)   Resp 18   Ht 4' 11" (1 499 m)   Wt 72 kg (158 lb 12 8 oz)   SpO2 99%   BMI 32 07 kg/m²          Physical Exam   Constitutional: She is oriented to person, place, and time  She appears well-developed and well-nourished  HENT:   Head: Normocephalic and atraumatic  Right Ear: External ear and ear canal normal  No decreased hearing is noted  Left Ear: Tympanic membrane, external ear and ear canal normal  No decreased hearing is noted  Nose: Nose normal    Impacted cerumen, right   Eyes: Pupils are equal, round, and reactive to light  Conjunctivae are normal    Neck: Neck supple  Cardiovascular: Normal rate, regular rhythm and normal heart sounds  No edema  Pulmonary/Chest: Effort normal and breath sounds normal  She has no wheezes  She has no rales  Abdominal: Soft  Bowel sounds are normal    Neurological: She is alert and oriented to person, place, and time  Skin: Skin is warm  No rash noted  Psychiatric: She has a normal mood and affect  Her behavior is normal    Nursing note and vitals reviewed  Lab results reviewed with patient

## 2018-10-22 NOTE — PATIENT INSTRUCTIONS
Limit salt in diet  Do blood work next visit (with Dr Alanna Cox blood work)  Use hydrogen peroxide drops (5 drops once a week for 1 month) in your right ear

## 2018-10-22 NOTE — ASSESSMENT & PLAN NOTE
Repeat BP remains elevated  Home BP readings normal   Discussed low salt diet  She will monitor her BP at home for the next few days  On HCTZ only

## 2018-10-25 ENCOUNTER — HOSPITAL ENCOUNTER (OUTPATIENT)
Dept: MAMMOGRAPHY | Facility: HOSPITAL | Age: 73
Discharge: HOME | End: 2018-10-25
Payer: COMMERCIAL

## 2018-10-25 DIAGNOSIS — Z12.31 ENCOUNTER FOR SCREENING MAMMOGRAM FOR BREAST CANCER: ICD-10-CM

## 2018-10-25 PROCEDURE — 77067 SCR MAMMO BI INCL CAD: CPT

## 2018-10-25 PROCEDURE — 77063 BREAST TOMOSYNTHESIS BI: CPT

## 2018-11-14 ENCOUNTER — HOSPITAL ENCOUNTER (OUTPATIENT)
Dept: RADIOLOGY | Facility: HOSPITAL | Age: 73
Discharge: HOME | End: 2018-11-14
Payer: COMMERCIAL

## 2018-11-14 DIAGNOSIS — R92.8 ABNORMAL MAMMOGRAM: ICD-10-CM

## 2018-11-14 PROCEDURE — 76642 ULTRASOUND BREAST LIMITED: CPT

## 2018-11-14 PROCEDURE — 77065 DX MAMMO INCL CAD UNI: CPT

## 2018-11-14 PROCEDURE — G0279 TOMOSYNTHESIS, MAMMO: HCPCS

## 2018-12-17 ENCOUNTER — TELEPHONE (OUTPATIENT)
Dept: INTERNAL MEDICINE CLINIC | Facility: CLINIC | Age: 73
End: 2018-12-17

## 2018-12-17 NOTE — TELEPHONE ENCOUNTER
Pt called and states pt central scheduling set her mammo and US up for her at [facility] and told her everything was set  Now pt got a bill and is being told that she went out of Network for her insurance  States she was never told she was out of network when she scheduled or went for the testing  Pt is upset that she got this bill now right at the holidays and she was never told this was not going to be covered       CC: Pastor Weiner

## 2019-02-02 ENCOUNTER — OFFICE VISIT (OUTPATIENT)
Dept: URGENT CARE | Facility: CLINIC | Age: 74
End: 2019-02-02
Payer: COMMERCIAL

## 2019-02-02 VITALS
OXYGEN SATURATION: 98 % | WEIGHT: 155.2 LBS | DIASTOLIC BLOOD PRESSURE: 90 MMHG | BODY MASS INDEX: 28.56 KG/M2 | TEMPERATURE: 96.4 F | HEART RATE: 109 BPM | HEIGHT: 62 IN | SYSTOLIC BLOOD PRESSURE: 185 MMHG | RESPIRATION RATE: 16 BRPM

## 2019-02-02 DIAGNOSIS — I10 HTN (HYPERTENSION), MALIGNANT: Primary | ICD-10-CM

## 2019-02-02 PROCEDURE — 99213 OFFICE O/P EST LOW 20 MIN: CPT | Performed by: NURSE PRACTITIONER

## 2019-02-02 RX ORDER — LISINOPRIL 10 MG/1
10 TABLET ORAL DAILY
Qty: 5 TABLET | Refills: 0 | Status: SHIPPED | OUTPATIENT
Start: 2019-02-02 | End: 2019-02-04 | Stop reason: SDUPTHER

## 2019-02-02 NOTE — PATIENT INSTRUCTIONS
Lisinopril 10mg daily, start today  You were only given 5 pills until able to see PCP  It is imperative that you contact PCP and see her within next 2-3 days  If you develop any new symptoms such as chest pain, worsening headache, confusion, weakness, etc- go immediately to ED  Continue all over medications

## 2019-02-02 NOTE — PROGRESS NOTES
[facility]        NAME: Marjan Ying is a 76 y o  female  : 1945    MRN: [id number]  DATE: 2019  TIME: 11:54 AM    Assessment and Plan   1  HTN (hypertension), malignant  Monitor bp  Must follow up with PCP within 2-3 days  - lisinopril (ZESTRIL) 10 mg tablet; Take 1 tablet (10 mg total) by mouth daily  Dispense: 5 tablet; Refill: 0          Patient Instructions   Lisinopril 10mg daily, start today  You were only given 5 pills until able to see PCP  It is imperative that you contact PCP and see her within next 2-3 days  If you develop any new symptoms such as chest pain, worsening headache, confusion, weakness, etc- go immediately to ED  Continue all over medication      Chief Complaint     Chief Complaint   Patient presents with    Hypertension     over the last few days BP is going up pt reports she is having a slight headache and has been taking her meds as ordered         History of Present Illness       Here for elevated bp  History of htn  On hctz  Monitors bp at home , normally 120/70  Last 3 days, 200/100, 200/90, 185/85, 200/100  Slight headache  Denies chest pain, sob  Denies visual or speech disturbance  No focal weakness  Review of Systems   Review of Systems   Eyes: Negative for photophobia and visual disturbance  Respiratory: Negative for chest tightness and shortness of breath  Cardiovascular: Negative for chest pain, palpitations and leg swelling  Gastrointestinal: Negative for abdominal pain and nausea  Musculoskeletal: Negative for back pain  Neurological: Negative for dizziness and headaches (slight)  Current Medications       Current Outpatient Prescriptions:     Calcium Carb-Cholecalciferol 1000-800 MG-UNIT TABS, Take 1 tablet by mouth daily, Disp: , Rfl:     Garlic 8626 MG CAPS, Take 1,000 mg by mouth daily  , Disp: , Rfl:     hydrochlorothiazide (HYDRODIURIL) 25 mg tablet, Take 1 tablet (25 mg total) by mouth daily, Disp: 90 tablet, Rfl: 1    levothyroxine 75 mcg tablet, Take 75 mcg by mouth daily  , Disp: , Rfl:     Multiple Vitamins-Minerals (MULTIVITAMIN WITH MINERALS) tablet, Take 1 tablet by mouth daily  , Disp: , Rfl:     Current Allergies     Allergies as of 02/02/2019    (No Known Allergies)            The following portions of the patient's history were reviewed and updated as appropriate: allergies, current medications, past family history, past medical history, past social history, past surgical history and problem list      Past Medical History:   Diagnosis Date    Anxiety     resolved: Oct 23, 2017    Colon cancer [facility])     Disease of thyroid gland     hypothyroidism    History of Helicobacter infection     onset: 6/14    Hypertension     Obesity        Past Surgical History:   Procedure Laterality Date    COLECTOMY      Partial     COLON SURGERY      right hemicolectomy 2014    COLON SURGERY      COLONOSCOPY W/ POLYPECTOMY      ESOPHAGOGASTRODUODENOSCOPY      Diagnostic - 6/14 in [address] [place] [address]      right inguinal hernia repair 1989    GA COLONOSCOPY FLX DX W/COLLJ SPEC WHEN PFRMD N/A 1/11/2016    Procedure: COLONOSCOPY;  Surgeon: Connie Smith MD;  [facility]; Service: Gastroenterology    TONSILLECTOMY      as a child       Family History   Problem Relation Age of Onset    Hypertension Mother     Alcohol abuse Father     Alzheimer's disease Father     Hyperlipidemia Sister         1 living sisters     No Known Problems Brother     No Known Problems Maternal Grandmother     No Known Problems Maternal Grandfather     No Known Problems Paternal Grandmother     No Known Problems Paternal Grandfather          Medications have been verified          Objective   BP (!) 185/90   Pulse (!) 109   Temp (!) 96 4 °F (35 8 °C) (Tympanic)   Resp 16   Ht 5' 2" (1 575 m)   Wt 70 4 kg (155 lb 3 2 oz)   SpO2 98%   BMI 28 39 kg/m²     Labs reviewed in EPIC 10/18,normal kidney function , BUN 12, creat 0 62  OV in Psychiatric 10/22/18, Dr Kae Schmidt, pt was advised to monitor BP, BP was elevated and noted to be on HCTZ only     Physical Exam     Physical Exam   Constitutional: She is oriented to person, place, and time  She appears well-developed and well-nourished  No distress  Eyes: EOM are normal    Cardiovascular: Normal rate, regular rhythm and normal heart sounds  No murmur heard  No audible carotid bruit  No JVD   Pulmonary/Chest: Effort normal and breath sounds normal    Abdominal: Soft  Neurological: She is alert and oriented to person, place, and time  Coordination normal    Speech clear and appropriate  No facial asymmetry  No focality  Motor strenght 5/5 throughout  Skin: Skin is warm and dry  Vitals reviewed

## 2019-02-04 ENCOUNTER — TELEPHONE (OUTPATIENT)
Dept: GASTROENTEROLOGY | Facility: AMBULARY SURGERY CENTER | Age: 74
End: 2019-02-04

## 2019-02-04 DIAGNOSIS — I10 HTN (HYPERTENSION), MALIGNANT: ICD-10-CM

## 2019-02-04 DIAGNOSIS — I10 ESSENTIAL HYPERTENSION: Primary | ICD-10-CM

## 2019-02-04 PROBLEM — R58 INTERNAL HEMORRHAGE: Status: ACTIVE | Noted: 2019-02-04

## 2019-02-04 PROBLEM — K57.90 DIVERTICULOSIS OF INTESTINE WITHOUT BLEEDING: Status: ACTIVE | Noted: 2019-02-04

## 2019-02-04 RX ORDER — LISINOPRIL 10 MG/1
10 TABLET ORAL DAILY
Qty: 90 TABLET | Refills: 0 | Status: SHIPPED | OUTPATIENT
Start: 2019-02-04 | End: 2019-02-13 | Stop reason: SINTOL

## 2019-02-04 NOTE — TELEPHONE ENCOUNTER
Patient went to the [address] on Saturday  Patient too BP at home and it was 200/100, when she got to the [address] is was 185/90  At home readings;    2/2/19  5:30pm 130/71  P70  2/2/19  5:40pm 117/69  P71  2/3/19  7:25am 122/60  P82  2/4/19  8:30am 123/68  P72  2/4/19  8:40am 161/70  P71    They only gave her 5 pills of Lisinopril, she wants to know if she can have a refill  Also she wants to know if you want to see her before her next appointment on 4/22/2019

## 2019-02-04 NOTE — TELEPHONE ENCOUNTER
Any chest pain? Shortness of breath? Headache or other symptoms? If not, can continue monitoring your BP at home    If intermittent, can schedule 1 pm follow up appt    Refill sent

## 2019-02-05 ENCOUNTER — HOSPITAL ENCOUNTER (EMERGENCY)
Facility: HOSPITAL | Age: 74
Discharge: HOME | End: 2019-02-05
Attending: EMERGENCY MEDICINE | Admitting: EMERGENCY MEDICINE
Payer: COMMERCIAL

## 2019-02-05 VITALS
BODY MASS INDEX: 28.9 KG/M2 | RESPIRATION RATE: 18 BRPM | OXYGEN SATURATION: 98 % | DIASTOLIC BLOOD PRESSURE: 77 MMHG | HEART RATE: 82 BPM | TEMPERATURE: 98.3 F | WEIGHT: 158 LBS | SYSTOLIC BLOOD PRESSURE: 182 MMHG

## 2019-02-05 DIAGNOSIS — I10 ESSENTIAL HYPERTENSION: Primary | ICD-10-CM

## 2019-02-05 DIAGNOSIS — F41.1 ANXIETY STATE: ICD-10-CM

## 2019-02-05 PROCEDURE — 99283 EMERGENCY DEPT VISIT LOW MDM: CPT

## 2019-02-05 NOTE — TELEPHONE ENCOUNTER
Patient states she is afraid to eat because of her BP being high   states she likes coffee but hasn't had any today because she thinks that's bad for high BP , wants to know if you can suggest what to eat and drink

## 2019-02-05 NOTE — TELEPHONE ENCOUNTER
No chest pain, No SOB , No headache or other symptoms    Last night BP was  200/90  This morning after medication 183/90

## 2019-02-05 NOTE — DISCHARGE INSTRUCTIONS
Chronic Hypertension, Ambulatory Care   GENERAL INFORMATION:   Chronic hypertension  is a long-term condition in which your blood pressure (BP) is higher than normal  Your BP is the force of your blood moving against the walls of your arteries  Hypertension is a BP of 140/90 or higher  Common symptoms include the following:   · Headache     · Blurred vision    · Chest pain     · Dizziness or weakness     · Trouble breathing     · Nosebleeds  Seek immediate care for the following symptoms:   · Severe headache or vision loss    · Weakness in an arm or leg    · Confusion or difficulty speaking    · Discomfort in your chest that feels like squeezing, pressure, fullness, or pain    · Suddenly feeling lightheaded or trouble breathing    · Pain or discomfort in your back, neck, jaw, stomach, or arm  Treatment for chronic hypertension  may include medicine to lower your BP  You may also need to make lifestyle changes  Take your medicine exactly as directed  Manage chronic hypertension:   · Take your BP at home  Sit and rest for 5 minutes before you take your BP  Extend your arm and support it on a flat surface  Your arm should be at the same level as your heart  Follow the directions that came with your BP monitor  If possible, take at least 2 BP readings each time  Take your BP at least twice a day at the same times each day, such as morning and evening  Keep a log of your BP readings and bring it to your follow-up visits  · Eat less sodium (salt)  Do not add sodium to your food  Limit foods that are high in sodium, such as canned foods, potato chips, and cold cuts  Your healthcare provider may suggest that you follow the [address] [place] [address] Street  The plan is low in sodium, unhealthy fats, and total fat  It is high in potassium, calcium, and fiber  · Exercise regularly  Exercise at least 30 minutes per day, on most days of the week  This will help decrease your BP   Ask your healthcare provider about the best exercise plan for you  · Limit alcohol  Women should limit alcohol to 1 drink a day  Men should limit alcohol to 2 drinks a day  A drink of alcohol is 12 ounces of beer, 5 ounces of wine, or 1½ ounces of liquor  · Do not smoke  If you smoke, it is never too late to quit  Smoking can increase your BP  Smoking also worsens other health conditions you may have that can increase your risk for hypertension  Ask your healthcare provider for information if you need help quitting  Follow up with your healthcare provider as directed: You will need to return to have your BP checked and to have other lab tests done  Write down your questions so you remember to ask them during your visits  CARE AGREEMENT:   You have the right to help plan your care  Learn about your health condition and how it may be treated  Discuss treatment options with your caregivers to decide what care you want to receive  You always have the right to refuse treatment  The above information is an  only  It is not intended as medical advice for individual conditions or treatments  Talk to your doctor, nurse or pharmacist before following any medical regimen to see if it is safe and effective for you  © 2014 [address] is for End User's use only and may not be sold, redistributed or otherwise used for commercial purposes  All illustrations and images included in CareNotes® are the copyrighted property of A D A M , Inc  or Antonio Edwards

## 2019-02-05 NOTE — TELEPHONE ENCOUNTER
Limit coffee to one small cup a day    Limit salty foods - chips, pretzels, canned fish, canned soup etc

## 2019-02-05 NOTE — ED PROVIDER NOTES
History  Chief Complaint   Patient presents with    Hypertension     c/o on/off hypertention with nausea since last Friday  Pt denies HA, visual disturbances, SOB, CP at present time  History provided by:  Patient and spouse  Hypertension   Severity:  Unable to specify  Onset quality:  Gradual  Duration:  4 days  Timing:  Intermittent  Progression:  Unchanged  Chronicity:  Recurrent  Notable PTA blood pressures:  200/100  Context: medication change and stress    Relieved by:  Nothing  Worsened by:  Nothing  Ineffective treatments:  ACE inhibitors and diuretics  Associated symptoms: no chest pain, no dizziness, no fever, no headaches and no shortness of breath    Associated symptoms comment:  Patient admits to increasing anxiety  , states she is checking her blood pressure multiple times in our throughout the day  Has readings anywhere from 120/70 to 200/100  Recently went to urgent care for this were lisinopril was added to her medications  Has been constant contact with her PCP through phone calls, easily visible medical records here  Patient states she feels anxious, because of this checks her blood pressure at elevated get, gets more anxious or recheck her blood pressure at even more elevated, was 200/100 prior to coming here  Otherwise no headaches no neck pain no chest pain or shortness of breath  Prior to Admission Medications   Prescriptions Last Dose Informant Patient Reported? Taking? Calcium Carb-Cholecalciferol 1000-800 MG-UNIT TABS  Self Yes No   Sig: Take 1 tablet by mouth daily   Garlic 3528 MG CAPS  Self Yes No   Sig: Take 1,000 mg by mouth daily  Multiple Vitamins-Minerals (MULTIVITAMIN WITH MINERALS) tablet  Self Yes No   Sig: Take 1 tablet by mouth daily  hydrochlorothiazide (HYDRODIURIL) 25 mg tablet   No No   Sig: Take 1 tablet (25 mg total) by mouth daily   levothyroxine 75 mcg tablet  Self Yes No   Sig: Take 75 mcg by mouth daily     lisinopril (ZESTRIL) 10 mg tablet   No No   Sig: Take 1 tablet (10 mg total) by mouth daily      Facility-Administered Medications: None       Past Medical History:   Diagnosis Date    Anxiety     resolved: Oct 23, 2017    Colon cancer [facility])     Disease of thyroid gland     hypothyroidism    History of Helicobacter infection     onset: 6/14    Hypertension     Obesity        Past Surgical History:   Procedure Laterality Date    COLECTOMY      Partial     COLON SURGERY      right hemicolectomy 2014    COLON SURGERY      COLONOSCOPY W/ POLYPECTOMY      ESOPHAGOGASTRODUODENOSCOPY      Diagnostic - 6/14 in [address]      right inguinal hernia repair 1989    [place] COLONOSCOPY FLX DX W/COLLJ SPEC WHEN PFRMD N/A 1/11/2016    Procedure: COLONOSCOPY;  Surgeon: Lawrence Capone MD;  [facility]; Service: Gastroenterology    TONSILLECTOMY      as a child       Family History   Problem Relation Age of Onset    Hypertension Mother     Alcohol abuse Father     Alzheimer's disease Father     Hyperlipidemia Sister         1 living sisters     No Known Problems Brother     No Known Problems Maternal Grandmother     No Known Problems Maternal Grandfather     No Known Problems Paternal Grandmother     No Known Problems Paternal Grandfather      I have reviewed and agree with the history as documented  Social History   Substance Use Topics    Smoking status: Never Smoker    Smokeless tobacco: Never Used    Alcohol use No        Review of Systems   Constitutional: Negative for fever  Respiratory: Negative for chest tightness and shortness of breath  Cardiovascular: Negative for chest pain  Skin: Negative for rash  Neurological: Negative for dizziness, light-headedness and headaches  Physical Exam  Physical Exam   Constitutional: She appears well-developed  Hypertensive in triage I recheck blood pressure was 182/77 which was improved   HENT:   Head: Atraumatic     Eyes: Conjunctivae are normal  Right eye exhibits no discharge  Left eye exhibits no discharge  No scleral icterus  Neck: Neck supple  No tracheal deviation present  Pulmonary/Chest: Effort normal  No stridor  No respiratory distress  Musculoskeletal: She exhibits no deformity  Neurological: She is alert  She exhibits normal muscle tone  Coordination normal    Skin: Skin is warm and dry  No rash noted  No erythema  No pallor  Psychiatric: She has a normal mood and affect  Vitals reviewed  Vital Signs  ED Triage Vitals [02/05/19 1720]   Temperature Pulse Respirations Blood Pressure SpO2   98 3 °F (36 8 °C) 95 18 (!) 204/90 98 %      Temp Source Heart Rate Source Patient Position - Orthostatic VS BP Location FiO2 (%)   Oral Monitor Sitting Left arm --      Pain Score       No Pain           Vitals:    02/05/19 1720 02/05/19 1747   BP: (!) 204/90 (!) 182/77   Pulse: 95 82   Patient Position - Orthostatic VS: Sitting        Visual Acuity      ED Medications  Medications - No data to display    Diagnostic Studies  Results Reviewed     None                 No orders to display              Procedures  Procedures       Phone Contacts  ED Phone Contact    ED Course                               MDM  Number of Diagnoses or Management Options  Anxiety state: new and requires workup  Essential hypertension: new and requires workup  Diagnosis management comments: 63-year-old female asymptomatic hypertension other than feeling of anxiety  , showing a home blood pressure readings which are in the 120s , explained to her that at this time I do believe most this is anxiety provoked, I would not want treat with more blood pressure medicines as I am concerned that we would drop her blood pressure too low, specially in the middle the night if she was to wake up to go the bathroom I am nervous that it would be too low when she would presents after a fall , advised her to hold off on checking her blood pressure until she follows with her PCP on Thursday    Patient understands and is agreement of the plan  Amount and/or Complexity of Data Reviewed  Decide to obtain previous medical records or to obtain history from someone other than the patient: yes  Obtain history from someone other than the patient: yes  Review and summarize past medical records: yes        Disposition  Final diagnoses:   Essential hypertension   Anxiety state     Time reflects when diagnosis was documented in both MDM as applicable and the Disposition within this note     Time User Action Codes Description Comment    2/5/2019  5:48 PM Santiago Sal1 Marilyn Martinez Essential hypertension     2/5/2019  5:48 PM Maddy Media Add [F41 1] Anxiety state       ED Disposition     ED Disposition Condition Date/Time Comment    Discharge  Tue Feb 5, 2019  5:48 PM Bari Pierre Adore discharge to home/self care  Condition at discharge: Good        Follow-up Information    None         Discharge Medication List as of 2/5/2019  5:48 PM      CONTINUE these medications which have NOT CHANGED    Details   Calcium Carb-Cholecalciferol 1000-800 MG-UNIT TABS Take 1 tablet by mouth daily, Starting Fri 1/8/2016, Historical Med      Garlic 3883 MG CAPS Take 1,000 mg by mouth daily  , Historical Med      hydrochlorothiazide (HYDRODIURIL) 25 mg tablet Take 1 tablet (25 mg total) by mouth daily, Starting Mon 10/22/2018, Normal      levothyroxine 75 mcg tablet Take 75 mcg by mouth daily  , Historical Med      lisinopril (ZESTRIL) 10 mg tablet Take 1 tablet (10 mg total) by mouth daily, Starting Mon 2/4/2019, Normal      Multiple Vitamins-Minerals (MULTIVITAMIN WITH MINERALS) tablet Take 1 tablet by mouth daily  , Until Discontinued, Historical Med           No discharge procedures on file      ED Provider  Electronically Signed by           Stevie Marsh DO  02/05/19 0951

## 2019-02-05 NOTE — TELEPHONE ENCOUNTER
Increase lisinopril to 20 mg daily  - if you took 10 mg already, take another today  Schedule nurse visit this week for BP check  Please bring your BP machine with you

## 2019-02-07 ENCOUNTER — CLINICAL SUPPORT (OUTPATIENT)
Dept: INTERNAL MEDICINE CLINIC | Facility: CLINIC | Age: 74
End: 2019-02-07

## 2019-02-07 ENCOUNTER — TELEPHONE (OUTPATIENT)
Dept: INTERNAL MEDICINE CLINIC | Facility: CLINIC | Age: 74
End: 2019-02-07

## 2019-02-07 VITALS — SYSTOLIC BLOOD PRESSURE: 152 MMHG | HEART RATE: 81 BPM | DIASTOLIC BLOOD PRESSURE: 80 MMHG

## 2019-02-07 DIAGNOSIS — I10 ESSENTIAL HYPERTENSION: ICD-10-CM

## 2019-02-07 PROCEDURE — RECHECK: Performed by: INTERNAL MEDICINE

## 2019-02-07 NOTE — PROGRESS NOTES
Patient here for BP check  Patient currently taking Lisinopril 10 mg 2 tablets/daily and HCTZ 25 mg daily      Today's reading with Home BP cuff:   (1st check) 188/93   (2nd check) 193/105    2/2  /87    2/2 /69    2/3 /72    2/3 /66    2/4 /70    2/4 /90     2/5  161/93     Instructed as per Dr Albert Lomeli:    1) Increase Lisinopril to 30 mg/daily  2) Schedule BP recheck in 1 week  3) Get new BP monitor device

## 2019-02-13 ENCOUNTER — CLINICAL SUPPORT (OUTPATIENT)
Dept: INTERNAL MEDICINE CLINIC | Facility: CLINIC | Age: 74
End: 2019-02-13

## 2019-02-13 VITALS — HEART RATE: 78 BPM | SYSTOLIC BLOOD PRESSURE: 142 MMHG | DIASTOLIC BLOOD PRESSURE: 78 MMHG

## 2019-02-13 DIAGNOSIS — I10 ESSENTIAL HYPERTENSION: Primary | ICD-10-CM

## 2019-02-13 PROCEDURE — RECHECK: Performed by: INTERNAL MEDICINE

## 2019-02-13 RX ORDER — LOSARTAN POTASSIUM 50 MG/1
50 TABLET ORAL DAILY
Qty: 90 TABLET | Refills: 0 | Status: SHIPPED | OUTPATIENT
Start: 2019-02-13 | End: 2019-03-25 | Stop reason: ALTCHOICE

## 2019-02-13 NOTE — PATIENT INSTRUCTIONS
1 Stop lisinopril   2  Start Losartan 50mg (will send to pharmacy)  3  Return in 2 weeks for nurse visit

## 2019-02-13 NOTE — PROGRESS NOTES
Patient is here for BP check  Yesterdays home BP was 123/69 P:70  Increased Lisinopril to 30mg daily and takes HCTZ 25mg daily  States she had a slight cough prior to increasing Lisinopril but has got worse since increasing it- worst at night     Per Dr Grecia Diaz: 1  Stop lisinopril   2  Start Losartan 50mg (will send to pharmacy)  3  Return in 2 weeks for nurse visit     Patient notified

## 2019-02-26 ENCOUNTER — ANESTHESIA EVENT (OUTPATIENT)
Dept: PERIOP | Facility: AMBULARY SURGERY CENTER | Age: 74
End: 2019-02-26
Payer: COMMERCIAL

## 2019-02-27 ENCOUNTER — TELEPHONE (OUTPATIENT)
Dept: INTERNAL MEDICINE CLINIC | Facility: CLINIC | Age: 74
End: 2019-02-27

## 2019-02-27 NOTE — TELEPHONE ENCOUNTER
PT  CALLED  HAD TO CANCEL HER BP CHECK FOR TODAY BECAUSE SHE DOESN'T HAVE TRANSPORTATION  I RSD'D HER FOR NEXT FRI  SHE SAID HER BP HAS BEEN GOOD- 123/70

## 2019-03-11 ENCOUNTER — HOSPITAL ENCOUNTER (OUTPATIENT)
Facility: AMBULARY SURGERY CENTER | Age: 74
Setting detail: OUTPATIENT SURGERY
Discharge: HOME | End: 2019-03-11
Attending: INTERNAL MEDICINE | Admitting: INTERNAL MEDICINE
Payer: COMMERCIAL

## 2019-03-11 ENCOUNTER — ANESTHESIA (OUTPATIENT)
Dept: PERIOP | Facility: AMBULARY SURGERY CENTER | Age: 74
End: 2019-03-11
Payer: COMMERCIAL

## 2019-03-11 VITALS
TEMPERATURE: 97 F | SYSTOLIC BLOOD PRESSURE: 147 MMHG | WEIGHT: 154.5 LBS | RESPIRATION RATE: 18 BRPM | DIASTOLIC BLOOD PRESSURE: 65 MMHG | HEART RATE: 75 BPM | BODY MASS INDEX: 29.17 KG/M2 | OXYGEN SATURATION: 100 % | HEIGHT: 61 IN

## 2019-03-11 PROBLEM — R58 INTERNAL HEMORRHAGE: Status: RESOLVED | Noted: 2019-02-04 | Resolved: 2019-03-11

## 2019-03-11 PROCEDURE — G0105 COLORECTAL SCRN; HI RISK IND: HCPCS | Performed by: INTERNAL MEDICINE

## 2019-03-11 RX ORDER — SODIUM CHLORIDE 9 MG/ML
125 INJECTION, SOLUTION INTRAVENOUS CONTINUOUS
Status: DISCONTINUED | OUTPATIENT
Start: 2019-03-11 | End: 2019-03-11 | Stop reason: HOSPADM

## 2019-03-11 RX ORDER — PROPOFOL 10 MG/ML
INJECTION, EMULSION INTRAVENOUS AS NEEDED
Status: DISCONTINUED | OUTPATIENT
Start: 2019-03-11 | End: 2019-03-11 | Stop reason: SURG

## 2019-03-11 RX ORDER — LIDOCAINE HYDROCHLORIDE 10 MG/ML
INJECTION, SOLUTION INFILTRATION; PERINEURAL AS NEEDED
Status: DISCONTINUED | OUTPATIENT
Start: 2019-03-11 | End: 2019-03-11 | Stop reason: SURG

## 2019-03-11 RX ADMIN — LIDOCAINE HYDROCHLORIDE ANHYDROUS 50 MG: 10 INJECTION, SOLUTION INFILTRATION at 08:44

## 2019-03-11 RX ADMIN — PROPOFOL 50 MG: 10 INJECTION, EMULSION INTRAVENOUS at 08:47

## 2019-03-11 RX ADMIN — PROPOFOL 150 MG: 10 INJECTION, EMULSION INTRAVENOUS at 08:44

## 2019-03-11 RX ADMIN — PROPOFOL 30 MG: 10 INJECTION, EMULSION INTRAVENOUS at 08:51

## 2019-03-11 RX ADMIN — SODIUM CHLORIDE 125 ML/HR: 0.9 INJECTION, SOLUTION INTRAVENOUS at 08:06

## 2019-03-11 NOTE — OP NOTE
Colonoscopy Procedure Note    Procedure: Colonoscopy    Sedation: Monitored anesthesia care, check anesthesia records      ASA Class: 2    INDICATIONS:  History of colon cancer    POST-OP DIAGNOSIS: See the impression below    Procedure Details     Prior colonoscopy: 3 years ago  Informed consent was obtained for the procedure, including sedation  Risks of perforation, hemorrhage, adverse drug reaction and aspiration were discussed  The patient was placed in the left lateral decubitus position  Based on the pre-procedure assessment, including review of the patient's medical history, medications, allergies, and review of systems, she had been deemed to be an appropriate candidate for conscious sedation; she was therefore sedated with the medications listed below  The patient was monitored continuously with telemetry, pulse oximetry, blood pressure monitoring, and direct observations  A rectal examination was performed  The colonoscope was inserted into the rectum and advanced under direct vision to the cecum, which was identified by the ileocecal valve and appendiceal orifice  The quality of the colonic preparation was good  A careful inspection was made as the colonoscope was withdrawn, including a retroflexed view of the rectum; findings and interventions are described below  Findings:    Healthy-appearing end-to-side ileocolonic anastomosis  Sigmoid diverticulosis and internal hemorrhoids  Otherwise normal colonoscopy with good prep good visualization  Anastomosis the approximately at the ascending  Complications: None; patient tolerated the procedure well      Impression:      Healthy-appearing ileocolonic anastomosis, diverticulosis and hemorrhoids    Recommendations:    Discharge home  Resume regular diet  Resume home medications  Repeat colonoscopy in 5 years, due to personal history of colon cancer  Call with any abdominal pain, bleeding, fevers    COMPLICATIONS:  None; patient tolerated the procedure well      SPECIMENS:  * No specimens in log *    ESTIMATED BLOOD LOSS:  Minimal

## 2019-03-11 NOTE — H&P
History and Physical - [facility]  Jennifer Garg 76 y o  female MRN: [id number]    HPI: Jennifer Garg is a 76y o  year old female who presents with hx of colon cancer, last colonoscopy 2016  Review of Systems    Historical Information   Past Medical History:   Diagnosis Date    Anxiety     resolved: Oct 23, 2017    Colon cancer [facility])     Disease of thyroid gland     hypothyroidism    History of Helicobacter infection     onset: 6/14    Hypertension     Obesity      Past Surgical History:   Procedure Laterality Date    COLECTOMY      Partial     COLON SURGERY      right hemicolectomy 2014    COLON SURGERY      COLONOSCOPY W/ POLYPECTOMY      ESOPHAGOGASTRODUODENOSCOPY      Diagnostic - 6/14 in [facility]      right inguinal hernia repair 1989    CO COLONOSCOPY FLX DX W/COLLJ SPEC WHEN PFRMD N/A 1/11/2016    Procedure: COLONOSCOPY;  Surgeon: Zurdo Navas MD;  Location: [facility];   Service: Gastroenterology    TONSILLECTOMY      as a child     Social History   Social History     Substance and Sexual Activity   Alcohol Use No     Social History     Substance and Sexual Activity   Drug Use No     Social History     Tobacco Use   Smoking Status Never Smoker   Smokeless Tobacco Never Used     Family History   Problem Relation Age of Onset    Hypertension Mother     Alcohol abuse Father     Alzheimer's disease Father     Hyperlipidemia Sister         3 living sisters     No Known Problems Brother     No Known Problems Maternal Grandmother     No Known Problems Maternal Grandfather     No Known Problems Paternal Grandmother     No Known Problems Paternal Grandfather        Meds/Allergies     Medications Prior to Admission   Medication    hydrochlorothiazide (HYDRODIURIL) 25 mg tablet    levothyroxine 75 mcg tablet    losartan (COZAAR) 50 mg tablet    Calcium Carb-Cholecalciferol 1000-800 MG-UNIT TABS    Garlic 5922 MG CAPS    Multiple Vitamins-Minerals (MULTIVITAMIN WITH MINERALS) tablet       No Known Allergies    Objective     Blood pressure (!) 181/85, pulse 92, temperature 97 5 °F (36 4 °C), temperature source Temporal, resp  rate 18, height 5' 1" (1 549 m), weight 70 1 kg (154 lb 8 oz), SpO2 99 %  PHYSICAL EXAM    Gen: NAD  CV: RRR  CHEST: Clear  ABD: soft, NT/ND  EXT: no edema  Neuro: AAO      ASSESSMENT/PLAN:  This is a 76y o  year old female here for  hx of colon cancer, last colonoscopy 2016         PLAN:   Procedure: colonoscopy

## 2019-03-11 NOTE — DISCHARGE INSTRUCTIONS
Discharge home  Resume regular diet  Resume home medications  Repeat colonoscopy in 5 years, due to personal history of colon cancer  Call with any abdominal pain, bleeding, fevers

## 2019-03-11 NOTE — ANESTHESIA PREPROCEDURE EVALUATION
Review of Systems/Medical History  Patient summary reviewed  Chart reviewed  No history of anesthetic complications     Cardiovascular  Hyperlipidemia, Hypertension ,    Pulmonary  Negative pulmonary ROS        GI/Hepatic    GI malignancy, Bowel prep  Comment: Hx of colon cancer  Diverticulosis     Negative  ROS        Endo/Other  History of thyroid disease , hypothyroidism,   Obesity    GYN  Negative gynecology ROS          Hematology  Anemia iron deficiency anemia,     Musculoskeletal  Negative musculoskeletal ROS        Neurology  Negative neurology ROS      Psychology   Anxiety,              Physical Exam    Airway    Mallampati score: II  TM Distance: >3 FB  Neck ROM: full     Dental   No notable dental hx     Cardiovascular  Rhythm: regular, Rate: normal, Cardiovascular exam normal    Pulmonary  Pulmonary exam normal Breath sounds clear to auscultation,     Other Findings        Anesthesia Plan  ASA Score- 2     Anesthesia Type- IV sedation with anesthesia with ASA Monitors  Additional Monitors:   Airway Plan:         Plan Factors-    Induction- intravenous  Postoperative Plan-     Informed Consent- Anesthetic plan and risks discussed with patient  I personally reviewed this patient with the CRNA  Discussed and agreed on the Anesthesia Plan with the CRNA  Dara Soulier Recent labs personally reviewed:  Lab Results   Component Value Date    WBC 8 8 10/15/2018    HGB 14 5 10/15/2018     10/15/2018     Lab Results   Component Value Date     10/02/2017    K 4 1 10/15/2018    BUN 12 10/15/2018    CREATININE 0 62 10/02/2017     I, Ed Valle MD, have personally seen and evaluated the patient prior to anesthetic care  I have reviewed the pre-anesthetic record, and other medical records if appropriate to the anesthetic care  If a CRNA is involved in the case, I have reviewed the CRNA assessment, if present, and agree   Risks/benefits and alternatives discussed with patient including possible PONV, sore throat, and possibility of rare anesthetic and surgical emergencies

## 2019-03-14 ENCOUNTER — CLINICAL SUPPORT (OUTPATIENT)
Dept: INTERNAL MEDICINE CLINIC | Facility: CLINIC | Age: 74
End: 2019-03-14
Payer: COMMERCIAL

## 2019-03-14 VITALS — SYSTOLIC BLOOD PRESSURE: 128 MMHG | HEART RATE: 84 BPM | DIASTOLIC BLOOD PRESSURE: 76 MMHG

## 2019-03-14 DIAGNOSIS — I10 ESSENTIAL HYPERTENSION: ICD-10-CM

## 2019-03-14 PROCEDURE — 99211 OFF/OP EST MAY X REQ PHY/QHP: CPT | Performed by: INTERNAL MEDICINE

## 2019-03-14 NOTE — PROGRESS NOTES
Patient is here for a BP check  Patient monitors at home   Normally 120/76    Today;  136/82     P 64  128/76     P 84    Medications;  -Hydrochlorothiazide 25mg 1x daily AM  -Losartan 50mg 1x daily AM        Per Dr Latrell Charlton;  -return next follow up

## 2019-03-25 ENCOUNTER — TELEPHONE (OUTPATIENT)
Dept: OTHER | Facility: OTHER | Age: 74
End: 2019-03-25

## 2019-03-25 DIAGNOSIS — I10 ESSENTIAL HYPERTENSION: ICD-10-CM

## 2019-03-25 RX ORDER — HYDROCHLOROTHIAZIDE 25 MG/1
25 TABLET ORAL DAILY
Qty: 90 TABLET | Refills: 1 | Status: SHIPPED | OUTPATIENT
Start: 2019-03-25 | End: 2019-10-22 | Stop reason: SDUPTHER

## 2019-03-25 RX ORDER — AMLODIPINE BESYLATE 5 MG/1
5 TABLET ORAL DAILY
Qty: 90 TABLET | Refills: 0 | Status: SHIPPED | OUTPATIENT
Start: 2019-03-25 | End: 2019-10-22 | Stop reason: SDUPTHER

## 2019-03-25 NOTE — TELEPHONE ENCOUNTER
Patient states the coughing is getting worse  She wants to stop the losartan, she wants to know if you can call something different in for her

## 2019-03-25 NOTE — TELEPHONE ENCOUNTER
Sent amlodipine to the pharmacy  Take one a day  Monitor for leg swelling  Stop losartan  Monitor BP at home    Keep appt with me next month

## 2019-03-25 NOTE — TELEPHONE ENCOUNTER
Pt called regarding he blood pressure pill  She was waiting for a call back since she did call this morning  Please call pt back in the morning at your earliest convenience

## 2019-04-09 LAB
ALBUMIN SERPL-MCNC: 4.2 G/DL (ref 3.6–5.1)
ALBUMIN/GLOB SERPL: 1.4 (CALC) (ref 1–2.5)
ALP SERPL-CCNC: 67 U/L (ref 33–130)
ALT SERPL-CCNC: 15 U/L (ref 6–29)
AST SERPL-CCNC: 17 U/L (ref 10–35)
BASOPHILS # BLD AUTO: 52 CELLS/UL (ref 0–200)
BASOPHILS NFR BLD AUTO: 0.6 %
BILIRUB SERPL-MCNC: 0.8 MG/DL (ref 0.2–1.2)
BUN SERPL-MCNC: 13 MG/DL (ref 7–25)
BUN/CREAT SERPL: ABNORMAL (CALC) (ref 6–22)
CALCIUM SERPL-MCNC: 9.3 MG/DL (ref 8.6–10.4)
CEA SERPL-MCNC: <0.5 NG/ML
CHLORIDE SERPL-SCNC: 96 MMOL/L (ref 98–110)
CHOLEST SERPL-MCNC: 218 MG/DL
CHOLEST/HDLC SERPL: 3.4 (CALC)
CO2 SERPL-SCNC: 30 MMOL/L (ref 20–32)
CREAT SERPL-MCNC: 0.61 MG/DL (ref 0.6–0.93)
EOSINOPHIL # BLD AUTO: 44 CELLS/UL (ref 15–500)
EOSINOPHIL NFR BLD AUTO: 0.5 %
ERYTHROCYTE [DISTWIDTH] IN BLOOD BY AUTOMATED COUNT: 13.2 % (ref 11–15)
GLOBULIN SER CALC-MCNC: 2.9 G/DL (CALC) (ref 1.9–3.7)
GLUCOSE SERPL-MCNC: 90 MG/DL (ref 65–99)
HCT VFR BLD AUTO: 44.3 % (ref 35–45)
HDLC SERPL-MCNC: 64 MG/DL
HGB BLD-MCNC: 14.4 G/DL (ref 11.7–15.5)
LDLC SERPL CALC-MCNC: 133 MG/DL (CALC)
LYMPHOCYTES # BLD AUTO: 2279 CELLS/UL (ref 850–3900)
LYMPHOCYTES NFR BLD AUTO: 26.2 %
MCH RBC QN AUTO: 27 PG (ref 27–33)
MCHC RBC AUTO-ENTMCNC: 32.5 G/DL (ref 32–36)
MCV RBC AUTO: 83.1 FL (ref 80–100)
MONOCYTES # BLD AUTO: 635 CELLS/UL (ref 200–950)
MONOCYTES NFR BLD AUTO: 7.3 %
NEUTROPHILS # BLD AUTO: 5690 CELLS/UL (ref 1500–7800)
NEUTROPHILS NFR BLD AUTO: 65.4 %
NONHDLC SERPL-MCNC: 154 MG/DL (CALC)
PLATELET # BLD AUTO: 279 THOUSAND/UL (ref 140–400)
PMV BLD REES-ECKER: 9.6 FL (ref 7.5–12.5)
POTASSIUM SERPL-SCNC: 3.9 MMOL/L (ref 3.5–5.3)
PROT SERPL-MCNC: 7.1 G/DL (ref 6.1–8.1)
RBC # BLD AUTO: 5.33 MILLION/UL (ref 3.8–5.1)
SL AMB EGFR AFRICAN AMERICAN: 104 ML/MIN/1.73M2
SL AMB EGFR NON AFRICAN AMERICAN: 89 ML/MIN/1.73M2
SODIUM SERPL-SCNC: 135 MMOL/L (ref 135–146)
TRIGL SERPL-MCNC: 99 MG/DL
WBC # BLD AUTO: 8.7 THOUSAND/UL (ref 3.8–10.8)

## 2019-04-12 ENCOUNTER — HOSPITAL ENCOUNTER (OUTPATIENT)
Dept: CT IMAGING | Facility: HOSPITAL | Age: 74
Discharge: HOME | End: 2019-04-12
Attending: INTERNAL MEDICINE
Payer: COMMERCIAL

## 2019-04-12 DIAGNOSIS — C18.8 OVERLAPPING MALIGNANT NEOPLASM OF COLON (HCC): ICD-10-CM

## 2019-04-12 PROCEDURE — 74177 CT ABD & PELVIS W/CONTRAST: CPT

## 2019-04-12 PROCEDURE — 71260 CT THORAX DX C+: CPT

## 2019-04-12 RX ADMIN — IOHEXOL 100 ML: 350 INJECTION, SOLUTION INTRAVENOUS at 08:23

## 2019-04-18 ENCOUNTER — OFFICE VISIT (OUTPATIENT)
Dept: HEMATOLOGY ONCOLOGY | Facility: CLINIC | Age: 74
End: 2019-04-18
Payer: COMMERCIAL

## 2019-04-18 VITALS
HEART RATE: 82 BPM | SYSTOLIC BLOOD PRESSURE: 158 MMHG | RESPIRATION RATE: 16 BRPM | HEIGHT: 61 IN | WEIGHT: 154 LBS | BODY MASS INDEX: 29.07 KG/M2 | OXYGEN SATURATION: 88 % | DIASTOLIC BLOOD PRESSURE: 82 MMHG | TEMPERATURE: 97.6 F

## 2019-04-18 DIAGNOSIS — C18.9 MALIGNANT NEOPLASM OF COLON, UNSPECIFIED PART OF COLON (HCC): Primary | ICD-10-CM

## 2019-04-18 PROCEDURE — 99213 OFFICE O/P EST LOW 20 MIN: CPT | Performed by: INTERNAL MEDICINE

## 2019-04-22 ENCOUNTER — OFFICE VISIT (OUTPATIENT)
Dept: INTERNAL MEDICINE CLINIC | Facility: CLINIC | Age: 74
End: 2019-04-22
Payer: COMMERCIAL

## 2019-04-22 VITALS
SYSTOLIC BLOOD PRESSURE: 136 MMHG | BODY MASS INDEX: 28.81 KG/M2 | HEIGHT: 61 IN | HEART RATE: 72 BPM | RESPIRATION RATE: 18 BRPM | WEIGHT: 152.6 LBS | TEMPERATURE: 97.8 F | DIASTOLIC BLOOD PRESSURE: 70 MMHG | OXYGEN SATURATION: 98 %

## 2019-04-22 DIAGNOSIS — Z00.00 HEALTH MAINTENANCE EXAMINATION: ICD-10-CM

## 2019-04-22 DIAGNOSIS — E66.3 OVERWEIGHT: ICD-10-CM

## 2019-04-22 DIAGNOSIS — M85.89 OSTEOPENIA OF MULTIPLE SITES: ICD-10-CM

## 2019-04-22 DIAGNOSIS — Z11.59 NEED FOR HEPATITIS C SCREENING TEST: ICD-10-CM

## 2019-04-22 DIAGNOSIS — I10 ESSENTIAL HYPERTENSION: Primary | ICD-10-CM

## 2019-04-22 DIAGNOSIS — E78.49 OTHER HYPERLIPIDEMIA: ICD-10-CM

## 2019-04-22 DIAGNOSIS — E55.9 VITAMIN D DEFICIENCY: ICD-10-CM

## 2019-04-22 DIAGNOSIS — E03.9 ACQUIRED HYPOTHYROIDISM: ICD-10-CM

## 2019-04-22 PROBLEM — J30.2 SEASONAL ALLERGIES: Status: ACTIVE | Noted: 2019-04-22

## 2019-04-22 PROCEDURE — 1170F FXNL STATUS ASSESSED: CPT | Performed by: INTERNAL MEDICINE

## 2019-04-22 PROCEDURE — 3008F BODY MASS INDEX DOCD: CPT | Performed by: INTERNAL MEDICINE

## 2019-04-22 PROCEDURE — 99214 OFFICE O/P EST MOD 30 MIN: CPT | Performed by: INTERNAL MEDICINE

## 2019-04-22 PROCEDURE — 1125F AMNT PAIN NOTED PAIN PRSNT: CPT | Performed by: INTERNAL MEDICINE

## 2019-04-22 PROCEDURE — 1036F TOBACCO NON-USER: CPT | Performed by: INTERNAL MEDICINE

## 2019-04-22 PROCEDURE — 3075F SYST BP GE 130 - 139MM HG: CPT | Performed by: INTERNAL MEDICINE

## 2019-04-22 PROCEDURE — G0439 PPPS, SUBSEQ VISIT: HCPCS | Performed by: INTERNAL MEDICINE

## 2019-04-22 PROCEDURE — 1160F RVW MEDS BY RX/DR IN RCRD: CPT | Performed by: INTERNAL MEDICINE

## 2019-04-22 PROCEDURE — 3078F DIAST BP <80 MM HG: CPT | Performed by: INTERNAL MEDICINE

## 2019-10-15 LAB
25(OH)D3 SERPL-MCNC: 64 NG/ML (ref 30–100)
ALBUMIN SERPL-MCNC: 4.3 G/DL (ref 3.6–5.1)
ALBUMIN/GLOB SERPL: 1.3 (CALC) (ref 1–2.5)
ALP SERPL-CCNC: 65 U/L (ref 33–130)
ALT SERPL-CCNC: 12 U/L (ref 6–29)
AST SERPL-CCNC: 15 U/L (ref 10–35)
BASOPHILS # BLD AUTO: 44 CELLS/UL (ref 0–200)
BASOPHILS NFR BLD AUTO: 0.6 %
BILIRUB SERPL-MCNC: 0.8 MG/DL (ref 0.2–1.2)
BUN SERPL-MCNC: 13 MG/DL (ref 7–25)
BUN/CREAT SERPL: ABNORMAL (CALC) (ref 6–22)
CALCIUM SERPL-MCNC: 9.3 MG/DL (ref 8.6–10.4)
CHLORIDE SERPL-SCNC: 96 MMOL/L (ref 98–110)
CHOLEST SERPL-MCNC: 238 MG/DL
CHOLEST/HDLC SERPL: 3.2 (CALC)
CO2 SERPL-SCNC: 28 MMOL/L (ref 20–32)
CREAT SERPL-MCNC: 0.66 MG/DL (ref 0.6–0.93)
EOSINOPHIL # BLD AUTO: 73 CELLS/UL (ref 15–500)
EOSINOPHIL NFR BLD AUTO: 1 %
ERYTHROCYTE [DISTWIDTH] IN BLOOD BY AUTOMATED COUNT: 13.6 % (ref 11–15)
GLOBULIN SER CALC-MCNC: 3.2 G/DL (CALC) (ref 1.9–3.7)
GLUCOSE SERPL-MCNC: 88 MG/DL (ref 65–99)
HCT VFR BLD AUTO: 46.8 % (ref 35–45)
HCV AB S/CO SERPL IA: 0.01
HCV AB SERPL QL IA: NORMAL
HDLC SERPL-MCNC: 75 MG/DL
HGB BLD-MCNC: 15.3 G/DL (ref 11.7–15.5)
LDLC SERPL CALC-MCNC: 146 MG/DL (CALC)
LYMPHOCYTES # BLD AUTO: 2424 CELLS/UL (ref 850–3900)
LYMPHOCYTES NFR BLD AUTO: 33.2 %
MCH RBC QN AUTO: 26.7 PG (ref 27–33)
MCHC RBC AUTO-ENTMCNC: 32.7 G/DL (ref 32–36)
MCV RBC AUTO: 81.5 FL (ref 80–100)
MONOCYTES # BLD AUTO: 350 CELLS/UL (ref 200–950)
MONOCYTES NFR BLD AUTO: 4.8 %
NEUTROPHILS # BLD AUTO: 4409 CELLS/UL (ref 1500–7800)
NEUTROPHILS NFR BLD AUTO: 60.4 %
NONHDLC SERPL-MCNC: 163 MG/DL (CALC)
PLATELET # BLD AUTO: 255 THOUSAND/UL (ref 140–400)
PMV BLD REES-ECKER: 10.3 FL (ref 7.5–12.5)
POTASSIUM SERPL-SCNC: 4 MMOL/L (ref 3.5–5.3)
PROT SERPL-MCNC: 7.5 G/DL (ref 6.1–8.1)
RBC # BLD AUTO: 5.74 MILLION/UL (ref 3.8–5.1)
SL AMB EGFR AFRICAN AMERICAN: 101 ML/MIN/1.73M2
SL AMB EGFR NON AFRICAN AMERICAN: 87 ML/MIN/1.73M2
SODIUM SERPL-SCNC: 135 MMOL/L (ref 135–146)
TRIGL SERPL-MCNC: 72 MG/DL
TSH SERPL-ACNC: 3.77 MIU/L (ref 0.4–4.5)
WBC # BLD AUTO: 7.3 THOUSAND/UL (ref 3.8–10.8)

## 2019-10-22 ENCOUNTER — OFFICE VISIT (OUTPATIENT)
Dept: INTERNAL MEDICINE CLINIC | Facility: CLINIC | Age: 74
End: 2019-10-22
Payer: COMMERCIAL

## 2019-10-22 VITALS
HEART RATE: 93 BPM | BODY MASS INDEX: 28.92 KG/M2 | SYSTOLIC BLOOD PRESSURE: 134 MMHG | RESPIRATION RATE: 18 BRPM | DIASTOLIC BLOOD PRESSURE: 72 MMHG | WEIGHT: 153.2 LBS | OXYGEN SATURATION: 98 % | TEMPERATURE: 99 F | HEIGHT: 61 IN

## 2019-10-22 DIAGNOSIS — E78.49 OTHER HYPERLIPIDEMIA: ICD-10-CM

## 2019-10-22 DIAGNOSIS — I10 ESSENTIAL HYPERTENSION: ICD-10-CM

## 2019-10-22 DIAGNOSIS — E03.9 ACQUIRED HYPOTHYROIDISM: Primary | ICD-10-CM

## 2019-10-22 DIAGNOSIS — Z23 NEED FOR INFLUENZA VACCINATION: ICD-10-CM

## 2019-10-22 DIAGNOSIS — Z71.9 HEALTH COUNSELING: ICD-10-CM

## 2019-10-22 DIAGNOSIS — M85.89 OSTEOPENIA OF MULTIPLE SITES: ICD-10-CM

## 2019-10-22 PROCEDURE — 99214 OFFICE O/P EST MOD 30 MIN: CPT | Performed by: INTERNAL MEDICINE

## 2019-10-22 PROCEDURE — 3008F BODY MASS INDEX DOCD: CPT | Performed by: INTERNAL MEDICINE

## 2019-10-22 PROCEDURE — G0008 ADMIN INFLUENZA VIRUS VAC: HCPCS | Performed by: INTERNAL MEDICINE

## 2019-10-22 PROCEDURE — 90662 IIV NO PRSV INCREASED AG IM: CPT | Performed by: INTERNAL MEDICINE

## 2019-10-22 PROCEDURE — 3078F DIAST BP <80 MM HG: CPT | Performed by: INTERNAL MEDICINE

## 2019-10-22 PROCEDURE — 3075F SYST BP GE 130 - 139MM HG: CPT | Performed by: INTERNAL MEDICINE

## 2019-10-22 RX ORDER — HYDROCHLOROTHIAZIDE 25 MG/1
25 TABLET ORAL DAILY
Qty: 90 TABLET | Refills: 1 | Status: SHIPPED | OUTPATIENT
Start: 2019-10-22 | End: 2020-08-16

## 2019-10-22 RX ORDER — AMLODIPINE BESYLATE 2.5 MG/1
2.5 TABLET ORAL DAILY
Qty: 90 TABLET | Refills: 1 | Status: SHIPPED | OUTPATIENT
Start: 2019-10-22 | End: 2020-03-13

## 2019-10-22 NOTE — ASSESSMENT & PLAN NOTE
Decrease amlodipine to 2 5 mg daily, continue HCTZ 25 mg  Monitor BP at home, monitor for dizziness

## 2019-10-22 NOTE — ASSESSMENT & PLAN NOTE
Lipids worse, limit cheese and processed meat  If no improvement by next visit, agrees to start statin

## 2019-10-22 NOTE — PROGRESS NOTES
Assessment/Plan:    Essential hypertension  Decrease amlodipine to 2 5 mg daily, continue HCTZ 25 mg  Monitor BP at home, monitor for dizziness  Other hyperlipidemia  Lipids worse, limit cheese and processed meat  If no improvement by next visit, agrees to start statin  Acquired hypothyroidism  Adequately replaced  Osteopenia of multiple sites  On supplements  Repeat bone density 2020  Overweight  No weight change  Malignant neoplasm of colon ([facility] [address] )  Repeat colonoscopy 2024  Follow up with oncology next year  Diagnoses and all orders for this visit:    Acquired hypothyroidism    Essential hypertension  -     hydrochlorothiazide (HYDRODIURIL) 25 mg tablet; Take 1 tablet (25 mg total) by mouth daily  -     amLODIPine (NORVASC) 2 5 mg tablet; Take 1 tablet (2 5 mg total) by mouth daily    Other hyperlipidemia  -     Comprehensive metabolic panel; Future  -     Lipid panel; Future    Osteopenia of multiple sites    Need for influenza vaccination  -     influenza vaccine, 7821-6383, high-dose, PF 0 5 mL (FLUZONE HIGH-DOSE)    Health counseling  Comments:  Flu vaccine today  Declined mammogram this year  Follow up in 6 months or as needed  Subjective:      Patient ID: John Leon is a 76 y o  female  Mrs Powell has been feeling well  She reports that a few months ago, after her flat all the way to [facility], she felt dizzy  She checked her blood pressure and it helped was running low in the 110s over 60  She went to her physician there and was told to stop taking one of her blood pressure medications  She did stop taking amlodipine while she was in [place]  Recently, since returning home, she noticed that her blood pressure was gradually increasing back in the 140s  She restarted taking her amlodipine now  She is asking if she can take it as needed 1 week at a time due to dizziness    She denies any headache, blurring of vision, chest pain, shortness of breath or other symptoms  She is otherwise feeling well, no GI symptoms  No abdominal pain or constipation  Currently experiencing no allergy symptoms  The following portions of the patient's history were reviewed and updated as appropriate: allergies, current medications, past medical history, past social history and problem list     Review of Systems   Constitutional: Negative for appetite change and fatigue  HENT: Negative for congestion, ear pain and postnasal drip  Eyes: Negative for visual disturbance  Respiratory: Negative for cough and shortness of breath  Cardiovascular: Negative for chest pain and leg swelling  Gastrointestinal: Negative for abdominal pain, constipation and diarrhea  Genitourinary: Negative for dysuria, frequency and urgency  Musculoskeletal: Negative for arthralgias and myalgias  Skin: Negative for rash and wound  Neurological: Negative for dizziness and headaches  Psychiatric/Behavioral: Negative for confusion  The patient is not nervous/anxious  Objective:      /72   Pulse 93   Temp 99 °F (37 2 °C)   Resp 18   Ht 5' 1" (1 549 m)   Wt 69 5 kg (153 lb 3 2 oz)   SpO2 98%   BMI 28 95 kg/m²          Physical Exam   Constitutional: She is oriented to person, place, and time  She appears well-developed and well-nourished  HENT:   Head: Normocephalic and atraumatic  Nose: Nose normal    Eyes: Pupils are equal, round, and reactive to light  Conjunctivae are normal    Neck: Neck supple  Cardiovascular: Normal rate, regular rhythm and normal heart sounds  Pulmonary/Chest: Effort normal and breath sounds normal  She has no wheezes  She has no rales  Abdominal: Soft  Bowel sounds are normal    Musculoskeletal: She exhibits no edema  Neurological: She is alert and oriented to person, place, and time  Skin: Skin is warm  No rash noted  Psychiatric: She has a normal mood and affect  Her behavior is normal    Nursing note and vitals reviewed  The 10-year ASCVD risk score (Elaine Velez et al , 2013) is: 21%    Values used to calculate the score:      Age: 76 years      Sex: Female      Is Non- : No      Diabetic: No      Tobacco smoker: No      Systolic Blood Pressure: 894 mmHg      Is BP treated: Yes      HDL Cholesterol: 75 mg/dL      Total Cholesterol: 238 mg/dL      Lab results reviewed with patient

## 2019-10-22 NOTE — PATIENT INSTRUCTIONS

## 2020-03-13 DIAGNOSIS — I10 ESSENTIAL HYPERTENSION: ICD-10-CM

## 2020-03-13 RX ORDER — AMLODIPINE BESYLATE 2.5 MG/1
TABLET ORAL
Qty: 90 TABLET | Refills: 1 | Status: SHIPPED | OUTPATIENT
Start: 2020-03-13 | End: 2020-06-03 | Stop reason: SDUPTHER

## 2020-04-14 ENCOUNTER — TELEPHONE (OUTPATIENT)
Dept: HEMATOLOGY ONCOLOGY | Facility: CLINIC | Age: 75
End: 2020-04-14

## 2020-05-11 ENCOUNTER — TELEPHONE (OUTPATIENT)
Dept: HEMATOLOGY ONCOLOGY | Facility: CLINIC | Age: 75
End: 2020-05-11

## 2020-05-13 LAB
CHOLEST SERPL-MCNC: 195 MG/DL
CHOLEST/HDLC SERPL: 3.9 (CALC)
HDLC SERPL-MCNC: 50 MG/DL
LDLC SERPL CALC-MCNC: 125 MG/DL (CALC)
NONHDLC SERPL-MCNC: 145 MG/DL (CALC)
TRIGL SERPL-MCNC: 98 MG/DL

## 2020-05-14 LAB
ALBUMIN SERPL-MCNC: 3.8 G/DL (ref 3.6–5.1)
ALBUMIN/GLOB SERPL: 1.3 (CALC) (ref 1–2.5)
ALP SERPL-CCNC: 61 U/L (ref 37–153)
ALT SERPL-CCNC: 12 U/L (ref 6–29)
AST SERPL-CCNC: 18 U/L (ref 10–35)
BASOPHILS # BLD AUTO: 38 CELLS/UL (ref 0–200)
BASOPHILS NFR BLD AUTO: 0.4 %
BILIRUB SERPL-MCNC: 0.7 MG/DL (ref 0.2–1.2)
BUN SERPL-MCNC: 14 MG/DL (ref 7–25)
BUN/CREAT SERPL: ABNORMAL (CALC) (ref 6–22)
CALCIUM SERPL-MCNC: 8.9 MG/DL (ref 8.6–10.4)
CEA SERPL-MCNC: <0.5 NG/ML
CHLORIDE SERPL-SCNC: 97 MMOL/L (ref 98–110)
CO2 SERPL-SCNC: 29 MMOL/L (ref 20–32)
CREAT SERPL-MCNC: 0.64 MG/DL (ref 0.6–0.93)
EOSINOPHIL # BLD AUTO: 141 CELLS/UL (ref 15–500)
EOSINOPHIL NFR BLD AUTO: 1.5 %
ERYTHROCYTE [DISTWIDTH] IN BLOOD BY AUTOMATED COUNT: 13.5 % (ref 11–15)
GLOBULIN SER CALC-MCNC: 2.9 G/DL (CALC) (ref 1.9–3.7)
GLUCOSE SERPL-MCNC: 89 MG/DL (ref 65–99)
HCT VFR BLD AUTO: 44.5 % (ref 35–45)
HGB BLD-MCNC: 14.4 G/DL (ref 11.7–15.5)
LYMPHOCYTES # BLD AUTO: 2989 CELLS/UL (ref 850–3900)
LYMPHOCYTES NFR BLD AUTO: 31.8 %
MCH RBC QN AUTO: 26.8 PG (ref 27–33)
MCHC RBC AUTO-ENTMCNC: 32.4 G/DL (ref 32–36)
MCV RBC AUTO: 82.7 FL (ref 80–100)
MONOCYTES # BLD AUTO: 724 CELLS/UL (ref 200–950)
MONOCYTES NFR BLD AUTO: 7.7 %
NEUTROPHILS # BLD AUTO: 5508 CELLS/UL (ref 1500–7800)
NEUTROPHILS NFR BLD AUTO: 58.6 %
PLATELET # BLD AUTO: 254 THOUSAND/UL (ref 140–400)
PMV BLD REES-ECKER: 9.9 FL (ref 7.5–12.5)
POTASSIUM SERPL-SCNC: 4.4 MMOL/L (ref 3.5–5.3)
PROT SERPL-MCNC: 6.7 G/DL (ref 6.1–8.1)
RBC # BLD AUTO: 5.38 MILLION/UL (ref 3.8–5.1)
SL AMB EGFR AFRICAN AMERICAN: 101 ML/MIN/1.73M2
SL AMB EGFR NON AFRICAN AMERICAN: 87 ML/MIN/1.73M2
SODIUM SERPL-SCNC: 134 MMOL/L (ref 135–146)
WBC # BLD AUTO: 9.4 THOUSAND/UL (ref 3.8–10.8)

## 2020-05-19 ENCOUNTER — OFFICE VISIT (OUTPATIENT)
Dept: HEMATOLOGY ONCOLOGY | Facility: CLINIC | Age: 75
End: 2020-05-19
Payer: COMMERCIAL

## 2020-05-19 VITALS
HEART RATE: 103 BPM | WEIGHT: 153 LBS | SYSTOLIC BLOOD PRESSURE: 174 MMHG | HEIGHT: 61 IN | OXYGEN SATURATION: 87 % | RESPIRATION RATE: 16 BRPM | TEMPERATURE: 98.2 F | BODY MASS INDEX: 28.89 KG/M2 | DIASTOLIC BLOOD PRESSURE: 100 MMHG

## 2020-05-19 DIAGNOSIS — C18.9 MALIGNANT NEOPLASM OF COLON, UNSPECIFIED PART OF COLON (HCC): Primary | ICD-10-CM

## 2020-05-19 PROCEDURE — 3080F DIAST BP >= 90 MM HG: CPT | Performed by: INTERNAL MEDICINE

## 2020-05-19 PROCEDURE — 99214 OFFICE O/P EST MOD 30 MIN: CPT | Performed by: INTERNAL MEDICINE

## 2020-05-19 PROCEDURE — 4040F PNEUMOC VAC/ADMIN/RCVD: CPT | Performed by: INTERNAL MEDICINE

## 2020-05-19 PROCEDURE — 3077F SYST BP >= 140 MM HG: CPT | Performed by: INTERNAL MEDICINE

## 2020-05-19 PROCEDURE — 1036F TOBACCO NON-USER: CPT | Performed by: INTERNAL MEDICINE

## 2020-05-19 PROCEDURE — 3008F BODY MASS INDEX DOCD: CPT | Performed by: INTERNAL MEDICINE

## 2020-05-19 PROCEDURE — 1160F RVW MEDS BY RX/DR IN RCRD: CPT | Performed by: INTERNAL MEDICINE

## 2020-05-19 RX ORDER — LORATADINE 10 MG/1
CAPSULE, LIQUID FILLED ORAL
COMMUNITY
Start: 2019-04-01

## 2020-05-28 ENCOUNTER — HOSPITAL ENCOUNTER (OUTPATIENT)
Dept: RADIOLOGY | Facility: HOSPITAL | Age: 75
Discharge: HOME | End: 2020-05-28
Payer: COMMERCIAL

## 2020-05-28 ENCOUNTER — OFFICE VISIT (OUTPATIENT)
Dept: INTERNAL MEDICINE CLINIC | Facility: CLINIC | Age: 75
End: 2020-05-28
Payer: COMMERCIAL

## 2020-05-28 ENCOUNTER — TELEPHONE (OUTPATIENT)
Dept: INTERNAL MEDICINE CLINIC | Facility: CLINIC | Age: 75
End: 2020-05-28

## 2020-05-28 VITALS
TEMPERATURE: 98.5 F | SYSTOLIC BLOOD PRESSURE: 130 MMHG | WEIGHT: 153 LBS | HEIGHT: 60 IN | BODY MASS INDEX: 30.04 KG/M2 | HEART RATE: 112 BPM | OXYGEN SATURATION: 94 % | DIASTOLIC BLOOD PRESSURE: 82 MMHG

## 2020-05-28 DIAGNOSIS — E03.9 ACQUIRED HYPOTHYROIDISM: ICD-10-CM

## 2020-05-28 DIAGNOSIS — C18.9 MALIGNANT NEOPLASM OF COLON, UNSPECIFIED PART OF COLON (HCC): ICD-10-CM

## 2020-05-28 DIAGNOSIS — Z20.828 EXPOSURE TO SARS-ASSOCIATED CORONAVIRUS: Primary | ICD-10-CM

## 2020-05-28 DIAGNOSIS — L30.9 HAND DERMATITIS: ICD-10-CM

## 2020-05-28 DIAGNOSIS — Z12.31 ENCOUNTER FOR SCREENING MAMMOGRAM FOR BREAST CANCER: ICD-10-CM

## 2020-05-28 DIAGNOSIS — R05.9 COUGH: Primary | ICD-10-CM

## 2020-05-28 DIAGNOSIS — E66.3 OVERWEIGHT: ICD-10-CM

## 2020-05-28 DIAGNOSIS — E78.49 OTHER HYPERLIPIDEMIA: ICD-10-CM

## 2020-05-28 DIAGNOSIS — R05.9 COUGH: ICD-10-CM

## 2020-05-28 DIAGNOSIS — Z00.00 HEALTH MAINTENANCE EXAMINATION: ICD-10-CM

## 2020-05-28 DIAGNOSIS — Z20.828 EXPOSURE TO SARS-ASSOCIATED CORONAVIRUS: ICD-10-CM

## 2020-05-28 DIAGNOSIS — M85.89 OSTEOPENIA OF MULTIPLE SITES: ICD-10-CM

## 2020-05-28 DIAGNOSIS — M79.89 SWELLING OF BOTH HANDS: ICD-10-CM

## 2020-05-28 DIAGNOSIS — J30.2 SEASONAL ALLERGIES: ICD-10-CM

## 2020-05-28 DIAGNOSIS — I10 ESSENTIAL HYPERTENSION: ICD-10-CM

## 2020-05-28 DIAGNOSIS — R91.1 LUNG NODULE: ICD-10-CM

## 2020-05-28 DIAGNOSIS — E55.9 VITAMIN D DEFICIENCY: ICD-10-CM

## 2020-05-28 PROCEDURE — 3079F DIAST BP 80-89 MM HG: CPT | Performed by: INTERNAL MEDICINE

## 2020-05-28 PROCEDURE — U0003 INFECTIOUS AGENT DETECTION BY NUCLEIC ACID (DNA OR RNA); SEVERE ACUTE RESPIRATORY SYNDROME CORONAVIRUS 2 (SARS-COV-2) (CORONAVIRUS DISEASE [COVID-19]), AMPLIFIED PROBE TECHNIQUE, MAKING USE OF HIGH THROUGHPUT TECHNOLOGIES AS DESCRIBED BY CMS-2020-01-R: HCPCS

## 2020-05-28 PROCEDURE — 4040F PNEUMOC VAC/ADMIN/RCVD: CPT | Performed by: INTERNAL MEDICINE

## 2020-05-28 PROCEDURE — 3075F SYST BP GE 130 - 139MM HG: CPT | Performed by: INTERNAL MEDICINE

## 2020-05-28 PROCEDURE — 3008F BODY MASS INDEX DOCD: CPT | Performed by: INTERNAL MEDICINE

## 2020-05-28 PROCEDURE — 99214 OFFICE O/P EST MOD 30 MIN: CPT | Performed by: INTERNAL MEDICINE

## 2020-05-28 PROCEDURE — 71046 X-RAY EXAM CHEST 2 VIEWS: CPT

## 2020-05-28 PROCEDURE — 1125F AMNT PAIN NOTED PAIN PRSNT: CPT | Performed by: INTERNAL MEDICINE

## 2020-05-28 PROCEDURE — 1036F TOBACCO NON-USER: CPT | Performed by: INTERNAL MEDICINE

## 2020-05-28 PROCEDURE — 1170F FXNL STATUS ASSESSED: CPT | Performed by: INTERNAL MEDICINE

## 2020-05-28 PROCEDURE — G0439 PPPS, SUBSEQ VISIT: HCPCS | Performed by: INTERNAL MEDICINE

## 2020-05-28 PROCEDURE — 1160F RVW MEDS BY RX/DR IN RCRD: CPT | Performed by: INTERNAL MEDICINE

## 2020-05-29 ENCOUNTER — TELEPHONE (OUTPATIENT)
Dept: INTERNAL MEDICINE CLINIC | Facility: CLINIC | Age: 75
End: 2020-05-29

## 2020-05-31 LAB — SARS-COV-2 RNA SPEC QL NAA+PROBE: NOT DETECTED

## 2020-06-01 ENCOUNTER — TELEPHONE (OUTPATIENT)
Dept: INTERNAL MEDICINE CLINIC | Facility: CLINIC | Age: 75
End: 2020-06-01

## 2020-06-01 DIAGNOSIS — R93.89 ABNORMAL CXR: ICD-10-CM

## 2020-06-01 DIAGNOSIS — R06.00 DYSPNEA ON EXERTION: Primary | ICD-10-CM

## 2020-06-03 DIAGNOSIS — I10 ESSENTIAL HYPERTENSION: ICD-10-CM

## 2020-06-03 RX ORDER — AMLODIPINE BESYLATE 2.5 MG/1
2.5 TABLET ORAL DAILY
Qty: 90 TABLET | Refills: 0 | Status: SHIPPED | OUTPATIENT
Start: 2020-06-03 | End: 2020-11-10

## 2020-06-05 ENCOUNTER — OFFICE VISIT (OUTPATIENT)
Dept: INTERNAL MEDICINE CLINIC | Facility: CLINIC | Age: 75
End: 2020-06-05
Payer: COMMERCIAL

## 2020-06-05 VITALS
DIASTOLIC BLOOD PRESSURE: 78 MMHG | HEART RATE: 92 BPM | OXYGEN SATURATION: 94 % | SYSTOLIC BLOOD PRESSURE: 154 MMHG | BODY MASS INDEX: 29.45 KG/M2 | TEMPERATURE: 98.6 F | WEIGHT: 150 LBS | RESPIRATION RATE: 20 BRPM | HEIGHT: 60 IN

## 2020-06-05 DIAGNOSIS — R05.8 DRY COUGH: Primary | ICD-10-CM

## 2020-06-05 DIAGNOSIS — R91.8 OPACITY OF LUNG ON IMAGING STUDY: ICD-10-CM

## 2020-06-05 DIAGNOSIS — R93.89 ABNORMAL CXR: ICD-10-CM

## 2020-06-05 PROCEDURE — 4040F PNEUMOC VAC/ADMIN/RCVD: CPT | Performed by: NURSE PRACTITIONER

## 2020-06-05 PROCEDURE — 3078F DIAST BP <80 MM HG: CPT | Performed by: NURSE PRACTITIONER

## 2020-06-05 PROCEDURE — 1036F TOBACCO NON-USER: CPT | Performed by: NURSE PRACTITIONER

## 2020-06-05 PROCEDURE — 1160F RVW MEDS BY RX/DR IN RCRD: CPT | Performed by: NURSE PRACTITIONER

## 2020-06-05 PROCEDURE — 1170F FXNL STATUS ASSESSED: CPT | Performed by: NURSE PRACTITIONER

## 2020-06-05 PROCEDURE — 3077F SYST BP >= 140 MM HG: CPT | Performed by: NURSE PRACTITIONER

## 2020-06-05 PROCEDURE — 99213 OFFICE O/P EST LOW 20 MIN: CPT | Performed by: NURSE PRACTITIONER

## 2020-06-05 PROCEDURE — 3008F BODY MASS INDEX DOCD: CPT | Performed by: NURSE PRACTITIONER

## 2020-06-24 ENCOUNTER — HOSPITAL ENCOUNTER (OUTPATIENT)
Dept: CT IMAGING | Facility: HOSPITAL | Age: 75
Discharge: HOME | End: 2020-06-24
Payer: COMMERCIAL

## 2020-06-24 DIAGNOSIS — R93.89 ABNORMAL CXR: ICD-10-CM

## 2020-06-24 DIAGNOSIS — R06.00 DYSPNEA ON EXERTION: ICD-10-CM

## 2020-06-24 PROCEDURE — 71250 CT THORAX DX C-: CPT

## 2020-06-26 ENCOUNTER — TELEPHONE (OUTPATIENT)
Dept: INTERNAL MEDICINE CLINIC | Facility: CLINIC | Age: 75
End: 2020-06-26

## 2020-06-29 ENCOUNTER — OFFICE VISIT (OUTPATIENT)
Dept: INTERNAL MEDICINE CLINIC | Facility: CLINIC | Age: 75
End: 2020-06-29
Payer: COMMERCIAL

## 2020-06-29 VITALS
DIASTOLIC BLOOD PRESSURE: 88 MMHG | HEART RATE: 112 BPM | SYSTOLIC BLOOD PRESSURE: 130 MMHG | RESPIRATION RATE: 18 BRPM | BODY MASS INDEX: 29.41 KG/M2 | HEIGHT: 60 IN | WEIGHT: 149.8 LBS | OXYGEN SATURATION: 98 % | TEMPERATURE: 97.3 F

## 2020-06-29 DIAGNOSIS — C18.9 MALIGNANT NEOPLASM OF COLON, UNSPECIFIED PART OF COLON (HCC): ICD-10-CM

## 2020-06-29 DIAGNOSIS — R05.9 COUGH: ICD-10-CM

## 2020-06-29 DIAGNOSIS — J84.10 PULMONARY FIBROSIS (HCC): Primary | ICD-10-CM

## 2020-06-29 PROCEDURE — 99213 OFFICE O/P EST LOW 20 MIN: CPT | Performed by: INTERNAL MEDICINE

## 2020-06-29 PROCEDURE — 1036F TOBACCO NON-USER: CPT | Performed by: INTERNAL MEDICINE

## 2020-06-29 PROCEDURE — 3075F SYST BP GE 130 - 139MM HG: CPT | Performed by: INTERNAL MEDICINE

## 2020-06-29 PROCEDURE — 3079F DIAST BP 80-89 MM HG: CPT | Performed by: INTERNAL MEDICINE

## 2020-06-29 PROCEDURE — 3008F BODY MASS INDEX DOCD: CPT | Performed by: INTERNAL MEDICINE

## 2020-06-29 PROCEDURE — 1160F RVW MEDS BY RX/DR IN RCRD: CPT | Performed by: INTERNAL MEDICINE

## 2020-06-29 PROCEDURE — 4040F PNEUMOC VAC/ADMIN/RCVD: CPT | Performed by: INTERNAL MEDICINE

## 2020-08-15 DIAGNOSIS — I10 ESSENTIAL HYPERTENSION: ICD-10-CM

## 2020-08-16 RX ORDER — HYDROCHLOROTHIAZIDE 25 MG/1
TABLET ORAL
Qty: 90 TABLET | Refills: 0 | Status: SHIPPED | OUTPATIENT
Start: 2020-08-16 | End: 2020-11-10

## 2020-08-17 DIAGNOSIS — E03.9 ACQUIRED HYPOTHYROIDISM: Primary | ICD-10-CM

## 2020-08-17 RX ORDER — LEVOTHYROXINE SODIUM 0.07 MG/1
75 TABLET ORAL DAILY
Qty: 90 TABLET | Refills: 1 | Status: SHIPPED | OUTPATIENT
Start: 2020-08-17 | End: 2021-02-08

## 2020-10-05 ENCOUNTER — CLINICAL SUPPORT (OUTPATIENT)
Dept: INTERNAL MEDICINE CLINIC | Facility: CLINIC | Age: 75
End: 2020-10-05
Payer: COMMERCIAL

## 2020-10-05 DIAGNOSIS — Z23 NEED FOR INFLUENZA VACCINATION: Primary | ICD-10-CM

## 2020-10-05 PROCEDURE — 90662 IIV NO PRSV INCREASED AG IM: CPT | Performed by: INTERNAL MEDICINE

## 2020-10-05 PROCEDURE — G0008 ADMIN INFLUENZA VIRUS VAC: HCPCS | Performed by: INTERNAL MEDICINE

## 2020-11-10 DIAGNOSIS — I10 ESSENTIAL HYPERTENSION: ICD-10-CM

## 2020-11-10 RX ORDER — AMLODIPINE BESYLATE 2.5 MG/1
TABLET ORAL
Qty: 90 TABLET | Refills: 0 | Status: SHIPPED | OUTPATIENT
Start: 2020-11-10 | End: 2021-02-08

## 2020-11-10 RX ORDER — HYDROCHLOROTHIAZIDE 25 MG/1
TABLET ORAL
Qty: 90 TABLET | Refills: 0 | Status: SHIPPED | OUTPATIENT
Start: 2020-11-10 | End: 2021-02-08

## 2020-11-26 LAB
25(OH)D3 SERPL-MCNC: 63 NG/ML (ref 30–100)
ALBUMIN SERPL-MCNC: 4.3 G/DL (ref 3.6–5.1)
ALBUMIN/GLOB SERPL: 1.2 (CALC) (ref 1–2.5)
ALP SERPL-CCNC: 67 U/L (ref 37–153)
ALT SERPL-CCNC: 10 U/L (ref 6–29)
AST SERPL-CCNC: 16 U/L (ref 10–35)
BASOPHILS # BLD AUTO: 18 CELLS/UL (ref 0–200)
BASOPHILS NFR BLD AUTO: 0.2 %
BILIRUB SERPL-MCNC: 0.7 MG/DL (ref 0.2–1.2)
BUN SERPL-MCNC: 11 MG/DL (ref 7–25)
BUN/CREAT SERPL: 19 (CALC) (ref 6–22)
CALCIUM SERPL-MCNC: 9.6 MG/DL (ref 8.6–10.4)
CHLORIDE SERPL-SCNC: 96 MMOL/L (ref 98–110)
CHOLEST SERPL-MCNC: 215 MG/DL
CHOLEST/HDLC SERPL: 3.5 (CALC)
CO2 SERPL-SCNC: 33 MMOL/L (ref 20–32)
CREAT SERPL-MCNC: 0.57 MG/DL (ref 0.6–0.93)
EOSINOPHIL # BLD AUTO: 64 CELLS/UL (ref 15–500)
EOSINOPHIL NFR BLD AUTO: 0.7 %
ERYTHROCYTE [DISTWIDTH] IN BLOOD BY AUTOMATED COUNT: 13.1 % (ref 11–15)
GLOBULIN SER CALC-MCNC: 3.5 G/DL (CALC) (ref 1.9–3.7)
GLUCOSE SERPL-MCNC: 96 MG/DL (ref 65–99)
HCT VFR BLD AUTO: 48.4 % (ref 35–45)
HDLC SERPL-MCNC: 62 MG/DL
HGB BLD-MCNC: 15.1 G/DL (ref 11.7–15.5)
LDLC SERPL CALC-MCNC: 129 MG/DL (CALC)
LYMPHOCYTES # BLD AUTO: 2705 CELLS/UL (ref 850–3900)
LYMPHOCYTES NFR BLD AUTO: 29.4 %
MCH RBC QN AUTO: 26.4 PG (ref 27–33)
MCHC RBC AUTO-ENTMCNC: 31.2 G/DL (ref 32–36)
MCV RBC AUTO: 84.8 FL (ref 80–100)
MONOCYTES # BLD AUTO: 497 CELLS/UL (ref 200–950)
MONOCYTES NFR BLD AUTO: 5.4 %
NEUTROPHILS # BLD AUTO: 5916 CELLS/UL (ref 1500–7800)
NEUTROPHILS NFR BLD AUTO: 64.3 %
NONHDLC SERPL-MCNC: 153 MG/DL (CALC)
PLATELET # BLD AUTO: 278 THOUSAND/UL (ref 140–400)
PMV BLD REES-ECKER: 10 FL (ref 7.5–12.5)
POTASSIUM SERPL-SCNC: 4 MMOL/L (ref 3.5–5.3)
PROT SERPL-MCNC: 7.8 G/DL (ref 6.1–8.1)
RBC # BLD AUTO: 5.71 MILLION/UL (ref 3.8–5.1)
SL AMB EGFR AFRICAN AMERICAN: 105 ML/MIN/1.73M2
SL AMB EGFR NON AFRICAN AMERICAN: 91 ML/MIN/1.73M2
SODIUM SERPL-SCNC: 134 MMOL/L (ref 135–146)
TRIGL SERPL-MCNC: 128 MG/DL
TSH SERPL-ACNC: 1.1 MIU/L (ref 0.4–4.5)
WBC # BLD AUTO: 9.2 THOUSAND/UL (ref 3.8–10.8)

## 2020-12-02 ENCOUNTER — HOSPITAL ENCOUNTER (OUTPATIENT)
Dept: RADIOLOGY | Facility: HOSPITAL | Age: 75
Discharge: HOME | End: 2020-12-02
Payer: COMMERCIAL

## 2020-12-02 ENCOUNTER — OFFICE VISIT (OUTPATIENT)
Dept: INTERNAL MEDICINE CLINIC | Facility: CLINIC | Age: 75
End: 2020-12-02
Payer: COMMERCIAL

## 2020-12-02 VITALS
BODY MASS INDEX: 29.64 KG/M2 | WEIGHT: 151 LBS | HEIGHT: 60 IN | DIASTOLIC BLOOD PRESSURE: 88 MMHG | TEMPERATURE: 96.7 F | OXYGEN SATURATION: 98 % | HEART RATE: 74 BPM | SYSTOLIC BLOOD PRESSURE: 132 MMHG

## 2020-12-02 DIAGNOSIS — R93.89 ABNORMAL CXR: Primary | ICD-10-CM

## 2020-12-02 DIAGNOSIS — E66.3 OVERWEIGHT: ICD-10-CM

## 2020-12-02 DIAGNOSIS — R91.1 LUNG NODULE: ICD-10-CM

## 2020-12-02 DIAGNOSIS — M85.89 OSTEOPENIA OF MULTIPLE SITES: ICD-10-CM

## 2020-12-02 DIAGNOSIS — J84.10 PULMONARY FIBROSIS (HCC): ICD-10-CM

## 2020-12-02 DIAGNOSIS — E78.49 OTHER HYPERLIPIDEMIA: ICD-10-CM

## 2020-12-02 DIAGNOSIS — J30.2 SEASONAL ALLERGIES: ICD-10-CM

## 2020-12-02 DIAGNOSIS — C18.9 MALIGNANT NEOPLASM OF COLON, UNSPECIFIED PART OF COLON (HCC): ICD-10-CM

## 2020-12-02 DIAGNOSIS — I10 ESSENTIAL HYPERTENSION: ICD-10-CM

## 2020-12-02 DIAGNOSIS — E03.9 ACQUIRED HYPOTHYROIDISM: ICD-10-CM

## 2020-12-02 DIAGNOSIS — R93.89 ABNORMAL CXR: ICD-10-CM

## 2020-12-02 PROCEDURE — T1015 CLINIC SERVICE: HCPCS | Performed by: INTERNAL MEDICINE

## 2020-12-02 PROCEDURE — 99214 OFFICE O/P EST MOD 30 MIN: CPT | Performed by: INTERNAL MEDICINE

## 2020-12-02 PROCEDURE — 1160F RVW MEDS BY RX/DR IN RCRD: CPT | Performed by: INTERNAL MEDICINE

## 2020-12-02 PROCEDURE — 3725F SCREEN DEPRESSION PERFORMED: CPT | Performed by: INTERNAL MEDICINE

## 2020-12-02 PROCEDURE — 3008F BODY MASS INDEX DOCD: CPT | Performed by: INTERNAL MEDICINE

## 2020-12-02 PROCEDURE — 3079F DIAST BP 80-89 MM HG: CPT | Performed by: INTERNAL MEDICINE

## 2020-12-02 PROCEDURE — 71046 X-RAY EXAM CHEST 2 VIEWS: CPT

## 2020-12-02 PROCEDURE — 3075F SYST BP GE 130 - 139MM HG: CPT | Performed by: INTERNAL MEDICINE

## 2020-12-02 PROCEDURE — 1036F TOBACCO NON-USER: CPT | Performed by: INTERNAL MEDICINE

## 2020-12-03 ENCOUNTER — TELEPHONE (OUTPATIENT)
Dept: INTERNAL MEDICINE CLINIC | Facility: CLINIC | Age: 75
End: 2020-12-03

## 2021-01-20 DIAGNOSIS — Z23 ENCOUNTER FOR IMMUNIZATION: ICD-10-CM

## 2021-01-21 ENCOUNTER — IMMUNIZATIONS (OUTPATIENT)
Dept: FAMILY MEDICINE CLINIC | Facility: HOSPITAL | Age: 76
End: 2021-01-21

## 2021-01-21 DIAGNOSIS — Z23 ENCOUNTER FOR IMMUNIZATION: Primary | ICD-10-CM

## 2021-01-21 PROCEDURE — 0001A SARS-COV-2 / COVID-19 MRNA VACCINE (PFIZER-BIONTECH) 30 MCG: CPT

## 2021-01-21 PROCEDURE — 91300 SARS-COV-2 / COVID-19 MRNA VACCINE (PFIZER-BIONTECH) 30 MCG: CPT

## 2021-02-08 DIAGNOSIS — E03.9 ACQUIRED HYPOTHYROIDISM: ICD-10-CM

## 2021-02-08 DIAGNOSIS — I10 ESSENTIAL HYPERTENSION: ICD-10-CM

## 2021-02-08 RX ORDER — AMLODIPINE BESYLATE 2.5 MG/1
TABLET ORAL
Qty: 90 TABLET | Refills: 1 | Status: SHIPPED | OUTPATIENT
Start: 2021-02-08 | End: 2021-07-27

## 2021-02-08 RX ORDER — LEVOTHYROXINE SODIUM 0.07 MG/1
TABLET ORAL
Qty: 90 TABLET | Refills: 1 | Status: SHIPPED | OUTPATIENT
Start: 2021-02-08 | End: 2021-07-27

## 2021-02-08 RX ORDER — HYDROCHLOROTHIAZIDE 25 MG/1
TABLET ORAL
Qty: 90 TABLET | Refills: 1 | Status: SHIPPED | OUTPATIENT
Start: 2021-02-08 | End: 2021-07-27

## 2021-02-10 ENCOUNTER — IMMUNIZATIONS (OUTPATIENT)
Dept: FAMILY MEDICINE CLINIC | Facility: HOSPITAL | Age: 76
End: 2021-02-10

## 2021-02-10 DIAGNOSIS — Z23 ENCOUNTER FOR IMMUNIZATION: Primary | ICD-10-CM

## 2021-02-10 PROCEDURE — 0002A SARS-COV-2 / COVID-19 MRNA VACCINE (PFIZER-BIONTECH) 30 MCG: CPT

## 2021-02-10 PROCEDURE — 91300 SARS-COV-2 / COVID-19 MRNA VACCINE (PFIZER-BIONTECH) 30 MCG: CPT

## 2021-05-26 NOTE — PROGRESS NOTES
Assessment & Plan:     K57 90 Diverticulosis  I have evaluated the patient and found the condition to be: Stable  I have evaluated the patient and: Recommended continue with same treatment plan    C18 9 Malignant neoplasm of colon ([facility] )  I have evaluated the patient and found the condition to be: Stable  I have evaluated the patient and: Recommended continue with same treatment plan    E03 9 Acquired hypothyroidism  I have evaluated the patient and found the condition to be: Stable  I have evaluated the patient and: Recommended continue with same treatment plan    J84 10 Pulmonary fibrosis ([facility] )  I have evaluated the patient and found the condition to be: Stable  I have evaluated the patient and: Recommended continue with same treatment plan    I10 Essential hypertension  I have evaluated the patient and found the condition to be: Stable  I have evaluated the patient and: Recommended continue with same treatment plan    M85 89 Osteopenia of multiple sites  I have evaluated the patient and found the condition to be: Stable  I have evaluated the patient and: Recommended continue with same treatment plan    E78 49 Other hyperlipidemia  I have evaluated the patient and found the condition to be: Stable  I have evaluated the patient and: Recommended continue with same treatment plan    R91 1 Lung nodule  I have evaluated the patient and found the condition to be: Resolved    E55 9 Vitamin D deficiency  I have evaluated the patient and found the condition to be: Stable  I have evaluated the patient and: Recommended continue with same treatment plan    E66 3 Overweight  I have evaluated the patient and found the condition to be: Stable  I have evaluated the patient and: Recommended continue with same treatment plan    BMI Counseling: Body mass index is 30 19 kg/m²  The BMI is above normal  Nutrition recommendations include encouraging healthy choices of fruits and vegetables and moderation in carbohydrate intake   Exercise recommendations include exercising 3-5 times per week  Follow up in 6 months or as needed  Subjective:     Patient ID: Kervin Mak is a 68 y o  female     Chief Complaint   Patient presents with    Follow-up      She feels well, has no complaints  She monitors her food intake, very good appetite   says she eats more than he does  Denies any cough, shortness of breath or breathing issues  She did not see the lung doctor, does not want to do any further  She moves her bowels regularly  No plans for travel in the future  Review of Systems   Constitutional: Negative for activity change, appetite change and fatigue  HENT: Negative for congestion  Eyes: Negative for visual disturbance  Respiratory: Negative for cough, shortness of breath and wheezing  Cardiovascular: Negative for chest pain and leg swelling  Gastrointestinal: Negative for abdominal pain, constipation and diarrhea  Genitourinary: Negative for dysuria, frequency and urgency  Musculoskeletal: Negative for arthralgias and myalgias  Skin: Negative for rash  Neurological: Negative for dizziness and headaches  Psychiatric/Behavioral: Negative for confusion  The patient is not nervous/anxious  Objective:      /94   Pulse 85   Temp 97 7 °F (36 5 °C)   Ht 4' 11 5" (1 511 m)   Wt 68 9 kg (152 lb)   SpO2 98%   BMI 30 19 kg/m²     Problem List Items Addressed This Visit        Digestive    Malignant neoplasm of colon ([facility] [address] )     Repeat colonoscopy 2024  Endocrine    Acquired hypothyroidism     Stable  Relevant Orders    TSH, 3rd generation with Free T4 reflex       Respiratory    Pulmonary fibrosis, unspecified (HCC)     No symptoms  Declined further imaging or evaluation  Cardiovascular and Mediastinum    Essential hypertension - Primary     Repeat BP remains elevated, home BP readings have been normal   On HCTZ and amlodipine           Relevant Orders Comprehensive metabolic panel    Lipid panel       Musculoskeletal and Integument    Osteopenia of multiple sites     Declined further screening  Continue daily walks, supplements  Other    Other hyperlipidemia     Lipids worse, recommend statin and discussed risk  She declined to start medication, will adjust diet instead  Overweight     Stable weight  Other Visit Diagnoses     Pulmonary fibrosis [facility])        Health maintenance examination              Physical Exam  Vitals signs and nursing note reviewed  Constitutional:       General: She is not in acute distress  Appearance: She is well-developed  HENT:      Head: Normocephalic and atraumatic  Eyes:      Conjunctiva/sclera: Conjunctivae normal    Neck:      Musculoskeletal: Neck supple  Cardiovascular:      Rate and Rhythm: Normal rate and regular rhythm  Heart sounds: No murmur  Pulmonary:      Effort: Pulmonary effort is normal  No respiratory distress  Breath sounds: Normal breath sounds  Abdominal:      Palpations: Abdomen is soft  Tenderness: There is no abdominal tenderness  Musculoskeletal:         General: No swelling  Right lower leg: No edema  Left lower leg: No edema  Skin:     General: Skin is warm and dry  Neurological:      General: No focal deficit present  Mental Status: She is alert and oriented to person, place, and time  Psychiatric:         Mood and Affect: Mood normal          Behavior: Behavior normal           Labs & imaging results reviewed with patient

## 2021-05-27 ENCOUNTER — RA CDI HCC (OUTPATIENT)
Dept: OTHER | Facility: HOSPITAL | Age: 76
End: 2021-05-27

## 2021-05-27 LAB
ALBUMIN SERPL-MCNC: 4.2 G/DL (ref 3.6–5.1)
ALBUMIN/GLOB SERPL: 1.4 (CALC) (ref 1–2.5)
ALP SERPL-CCNC: 57 U/L (ref 37–153)
ALT SERPL-CCNC: 12 U/L (ref 6–29)
AST SERPL-CCNC: 17 U/L (ref 10–35)
BASOPHILS # BLD AUTO: 44 CELLS/UL (ref 0–200)
BASOPHILS NFR BLD AUTO: 0.5 %
BILIRUB SERPL-MCNC: 0.5 MG/DL (ref 0.2–1.2)
BUN SERPL-MCNC: 11 MG/DL (ref 7–25)
BUN/CREAT SERPL: 20 (CALC) (ref 6–22)
CALCIUM SERPL-MCNC: 9.4 MG/DL (ref 8.6–10.4)
CHLORIDE SERPL-SCNC: 97 MMOL/L (ref 98–110)
CHOLEST SERPL-MCNC: 222 MG/DL
CHOLEST/HDLC SERPL: 3 (CALC)
CO2 SERPL-SCNC: 34 MMOL/L (ref 20–32)
CREAT SERPL-MCNC: 0.56 MG/DL (ref 0.6–0.93)
EOSINOPHIL # BLD AUTO: 44 CELLS/UL (ref 15–500)
EOSINOPHIL NFR BLD AUTO: 0.5 %
ERYTHROCYTE [DISTWIDTH] IN BLOOD BY AUTOMATED COUNT: 13.2 % (ref 11–15)
GLOBULIN SER CALC-MCNC: 3.1 G/DL (CALC) (ref 1.9–3.7)
GLUCOSE SERPL-MCNC: 98 MG/DL (ref 65–99)
HCT VFR BLD AUTO: 46.2 % (ref 35–45)
HDLC SERPL-MCNC: 75 MG/DL
HGB BLD-MCNC: 15.1 G/DL (ref 11.7–15.5)
LDLC SERPL CALC-MCNC: 130 MG/DL (CALC)
LYMPHOCYTES # BLD AUTO: 2930 CELLS/UL (ref 850–3900)
LYMPHOCYTES NFR BLD AUTO: 33.3 %
MCH RBC QN AUTO: 28 PG (ref 27–33)
MCHC RBC AUTO-ENTMCNC: 32.7 G/DL (ref 32–36)
MCV RBC AUTO: 85.7 FL (ref 80–100)
MONOCYTES # BLD AUTO: 484 CELLS/UL (ref 200–950)
MONOCYTES NFR BLD AUTO: 5.5 %
NEUTROPHILS # BLD AUTO: 5298 CELLS/UL (ref 1500–7800)
NEUTROPHILS NFR BLD AUTO: 60.2 %
NONHDLC SERPL-MCNC: 147 MG/DL (CALC)
PLATELET # BLD AUTO: 255 THOUSAND/UL (ref 140–400)
PMV BLD REES-ECKER: 10 FL (ref 7.5–12.5)
POTASSIUM SERPL-SCNC: 4.2 MMOL/L (ref 3.5–5.3)
PROT SERPL-MCNC: 7.3 G/DL (ref 6.1–8.1)
RBC # BLD AUTO: 5.39 MILLION/UL (ref 3.8–5.1)
SL AMB EGFR AFRICAN AMERICAN: 105 ML/MIN/1.73M2
SL AMB EGFR NON AFRICAN AMERICAN: 91 ML/MIN/1.73M2
SODIUM SERPL-SCNC: 135 MMOL/L (ref 135–146)
TRIGL SERPL-MCNC: 77 MG/DL
WBC # BLD AUTO: 8.8 THOUSAND/UL (ref 3.8–10.8)

## 2021-05-27 NOTE — PROGRESS NOTES
[place] [id number]  coding opportunities             Chart reviewed, (number of) suggestions sent to provider: 1     Problem listed updated   Provider Accepted, (number of) suggestions accepted: 1        Patients insurance company: [employer] (Medicare Advantage and Commercial)   dx  is on the bill  Visit status: Patient arrived for their scheduled appointment        [place] [address]  coding opportunities             Chart reviewed, (number of) suggestions sent to provider: 1       DX:  J84 10-Pulmonary fibrosis, unspecified    Noted malignancy on problem list-is not current      Patients insurance company: [employer] ([employer])

## 2021-06-04 ENCOUNTER — OFFICE VISIT (OUTPATIENT)
Dept: INTERNAL MEDICINE CLINIC | Facility: CLINIC | Age: 76
End: 2021-06-04
Payer: COMMERCIAL

## 2021-06-04 VITALS
BODY MASS INDEX: 29.84 KG/M2 | WEIGHT: 152 LBS | HEIGHT: 60 IN | SYSTOLIC BLOOD PRESSURE: 158 MMHG | OXYGEN SATURATION: 98 % | HEART RATE: 85 BPM | TEMPERATURE: 97.7 F | DIASTOLIC BLOOD PRESSURE: 94 MMHG

## 2021-06-04 DIAGNOSIS — E03.9 ACQUIRED HYPOTHYROIDISM: ICD-10-CM

## 2021-06-04 DIAGNOSIS — Z00.00 HEALTH MAINTENANCE EXAMINATION: ICD-10-CM

## 2021-06-04 DIAGNOSIS — E78.49 OTHER HYPERLIPIDEMIA: ICD-10-CM

## 2021-06-04 DIAGNOSIS — I10 ESSENTIAL HYPERTENSION: Primary | ICD-10-CM

## 2021-06-04 DIAGNOSIS — J84.10 PULMONARY FIBROSIS, UNSPECIFIED (HCC): ICD-10-CM

## 2021-06-04 DIAGNOSIS — J84.10 PULMONARY FIBROSIS (HCC): ICD-10-CM

## 2021-06-04 DIAGNOSIS — C18.9 MALIGNANT NEOPLASM OF COLON, UNSPECIFIED PART OF COLON (HCC): ICD-10-CM

## 2021-06-04 DIAGNOSIS — E66.3 OVERWEIGHT: ICD-10-CM

## 2021-06-04 DIAGNOSIS — M85.89 OSTEOPENIA OF MULTIPLE SITES: ICD-10-CM

## 2021-06-04 PROBLEM — R93.89 ABNORMAL CXR: Status: RESOLVED | Noted: 2020-06-01 | Resolved: 2021-06-04

## 2021-06-04 PROCEDURE — G0439 PPPS, SUBSEQ VISIT: HCPCS | Performed by: INTERNAL MEDICINE

## 2021-06-04 PROCEDURE — T1015 CLINIC SERVICE: HCPCS | Performed by: INTERNAL MEDICINE

## 2021-06-04 PROCEDURE — 3725F SCREEN DEPRESSION PERFORMED: CPT | Performed by: INTERNAL MEDICINE

## 2021-06-04 PROCEDURE — 3080F DIAST BP >= 90 MM HG: CPT | Performed by: INTERNAL MEDICINE

## 2021-06-04 PROCEDURE — 3077F SYST BP >= 140 MM HG: CPT | Performed by: INTERNAL MEDICINE

## 2021-06-04 PROCEDURE — 3288F FALL RISK ASSESSMENT DOCD: CPT | Performed by: INTERNAL MEDICINE

## 2021-06-04 PROCEDURE — 1036F TOBACCO NON-USER: CPT | Performed by: INTERNAL MEDICINE

## 2021-06-04 PROCEDURE — 1160F RVW MEDS BY RX/DR IN RCRD: CPT | Performed by: INTERNAL MEDICINE

## 2021-06-04 PROCEDURE — 99214 OFFICE O/P EST MOD 30 MIN: CPT | Performed by: INTERNAL MEDICINE

## 2021-06-04 PROCEDURE — 1170F FXNL STATUS ASSESSED: CPT | Performed by: INTERNAL MEDICINE

## 2021-06-04 PROCEDURE — 1125F AMNT PAIN NOTED PAIN PRSNT: CPT | Performed by: INTERNAL MEDICINE

## 2021-06-04 NOTE — ASSESSMENT & PLAN NOTE
Lipids worse, recommend statin and discussed risk  She declined to start medication, will adjust diet instead

## 2021-06-04 NOTE — PROGRESS NOTES
Assessment/Plan:    No problem-specific Assessment & Plan notes found for this encounter  {Assess/PlanSmartLinks:30306}      Subjective:      Patient ID: Gabi Pappas is a 68 y o  female      HPI    {Common ambulatory SmartLinks:54256}    Review of Systems      Objective:      /94   Pulse 85   Temp 97 7 °F (36 5 °C)   Ht 4' 11 5" (1 511 m)   Wt 68 9 kg (152 lb)   SpO2 98%   BMI 30 19 kg/m²          Physical Exam

## 2021-06-04 NOTE — PROGRESS NOTES
Assessment and Plan:     Problem List Items Addressed This Visit        Digestive    Malignant neoplasm of colon [facility])     Repeat colonoscopy 2024  Endocrine    Acquired hypothyroidism     Stable  Relevant Orders    TSH, 3rd generation with Free T4 reflex       Respiratory    Pulmonary fibrosis, unspecified (HCC)     No symptoms  Declined further imaging or evaluation  Cardiovascular and Mediastinum    Essential hypertension - Primary     Repeat BP remains elevated, home BP readings have been normal   On HCTZ and amlodipine  Relevant Orders    Comprehensive metabolic panel    Lipid panel       Musculoskeletal and Integument    Osteopenia of multiple sites     Declined further screening  Continue daily walks, supplements  Other    Other hyperlipidemia     Lipids worse, recommend statin and discussed risk  She declined to start medication, will adjust diet instead  Overweight     Stable weight  Other Visit Diagnoses     Pulmonary fibrosis ([facility] [address] )        Health maintenance examination            BMI Counseling: Body mass index is 30 19 kg/m²  The BMI is above normal  Exercise recommendations include exercising 3-5 times per week  Preventive health issues were discussed with patient, and age appropriate screening tests were ordered as noted in patient's After Visit Summary  Personalized health advice and appropriate referrals for health education or preventive services given if needed, as noted in patient's After Visit Summary       History of Present Illness:     Patient presents for Medicare Annual Wellness visit    Patient Care Team:  Margo Allen MD as PCP - Siomara Drew MD as PCP - PCP-MD Haleigh Hoover MD Vallorie Lacrosse, MD Aloma Harms, MD as Endoscopist     Problem List:     Patient Active Problem List   Diagnosis    Diverticulosis    Other hyperlipidemia  Essential hypertension    Acquired hypothyroidism    Internal hemorrhoids    Iron deficiency anemia    Lung nodule    Malignant neoplasm of colon (HCC)    Neuropathy    Vitamin D deficiency    Osteopenia of multiple sites    Overweight    Abnormal mammogram of left breast    Diverticulosis of intestine without bleeding    Seasonal allergies    Pulmonary fibrosis, unspecified (HCC)      Past Medical and Surgical History:     Past Medical History:   Diagnosis Date    Anxiety     resolved: Oct 23, 2017    Colon cancer [facility])     Disease of thyroid gland     hypothyroidism    History of Helicobacter infection     onset: 6/14    Hypertension     Obesity      Past Surgical History:   Procedure Laterality Date    COLECTOMY      Partial     COLON SURGERY      right hemicolectomy 2014    COLON SURGERY      COLONOSCOPY W/ POLYPECTOMY      ESOPHAGOGASTRODUODENOSCOPY      Diagnostic - 6/14 in [facility]      right inguinal hernia repair 1989    [place] COLONOSCOPY FLX DX W/COLLJ SPEC WHEN PFRMD N/A 1/11/2016    Procedure: COLONOSCOPY;  Surgeon: Joey Bailey MD;  [facility]; Service: Gastroenterology    [place] COLONOSCOPY FLX DX W/COLLJ SPEC WHEN PFRMD N/A 3/11/2019    Procedure: COLONOSCOPY;  Surgeon: Joey Bailey MD;  [facility];   Service: Gastroenterology    TONSILLECTOMY      as a child      Family History:     Family History   Problem Relation Age of Onset    Hypertension Mother     Alcohol abuse Father     Alzheimer's disease Father     Hyperlipidemia Sister         3 living sisters     No Known Problems Brother     No Known Problems Maternal Grandmother     No Known Problems Maternal Grandfather     No Known Problems Paternal Grandmother     No Known Problems Paternal Grandfather       Social History:        Social History     Socioeconomic History    Marital status: /Civil Union     Spouse name: None    Number of children: None    Years of education: None    Highest education level: None   Occupational History    Occupation: Retired    Social Needs    Financial resource strain: None    Food insecurity     Worry: None     Inability: None    Transportation needs     Medical: None     Non-medical: None   Tobacco Use    Smoking status: Never Smoker    Smokeless tobacco: Never Used   Substance and Sexual Activity    Alcohol use: No     Frequency: Never     Binge frequency: Never    Drug use: No    Sexual activity: None   Lifestyle    Physical activity     Days per week: None     Minutes per session: None    Stress: None   Relationships    Social connections     Talks on phone: None     Gets together: None     Attends Mu-ism service: None     Active member of club or organization: None     Attends meetings of clubs or organizations: None     Relationship status: None    Intimate partner violence     Fear of current or ex partner: None     Emotionally abused: None     Physically abused: None     Forced sexual activity: None   Other Topics Concern    None   Social History Narrative    Denied: History of exercise habits         From [place], visits yearly      Medications and Allergies:     Current Outpatient Medications   Medication Sig Dispense Refill    amLODIPine (NORVASC) 2 5 mg tablet Take 1 tablet by mouth once daily 90 tablet 1    Calcium Carb-Cholecalciferol 1000-800 MG-UNIT TABS Take 1 tablet by mouth daily      Garlic 2937 MG CAPS Take 1,000 mg by mouth daily   hydrochlorothiazide (HYDRODIURIL) 25 mg tablet Take 1 tablet by mouth once daily 90 tablet 1    levothyroxine 75 mcg tablet Take 1 tablet by mouth once daily 90 tablet 1    Loratadine 10 MG CAPS       Multiple Vitamins-Minerals (MULTIVITAMIN WITH MINERALS) tablet Take 1 tablet by mouth daily  No current facility-administered medications for this visit        No Known Allergies   Immunizations:     Immunization History   Administered Date(s) Administered  Influenza Split High Dose Preservative Free IM 10/02/2013, 10/03/2015, 10/15/2016, 10/16/2017    Influenza, high dose seasonal 0 7 mL 10/22/2018, 10/22/2019, 10/05/2020    Pneumococcal Conjugate 13-Valent 10/03/2015    Pneumococcal Polysaccharide PPV23 10/02/2012    SARS-CoV-2 / COVID-19 mRNA IM (Pfizer-BioNTech) 01/21/2021, 02/10/2021      Health Maintenance:         Topic Date Due    Colonoscopy Surveillance  03/11/2022    Hepatitis C Screening  Completed         Topic Date Due    DTaP,Tdap,and Td Vaccines (1 - Tdap) Never done      Medicare Health Risk Assessment:     /94   Pulse 85   Temp 97 7 °F (36 5 °C)   Ht 4' 11 5" (1 511 m)   Wt 68 9 kg (152 lb)   SpO2 98%   BMI 30 19 kg/m²      Lisha Dukes is here for her Subsequent Wellness visit  Last Medicare Wellness visit information reviewed, patient interviewed and updates made to the record today  Health Risk Assessment:   Patient rates overall health as very good  Patient feels that their physical health rating is slightly better  Patient is satisfied with their life  Eyesight was rated as same  Hearing was rated as same  Patient feels that their emotional and mental health rating is same  Patients states they are never, rarely angry  Patient states they are never, rarely unusually tired/fatigued  Pain experienced in the last 7 days has been none  Patient states that she has experienced no weight loss or gain in last 6 months  Depression Screening:   PHQ-2 Score: 0      Fall Risk Screening: In the past year, patient has experienced: no history of falling in past year      Urinary Incontinence Screening:   Patient has not leaked urine accidently in the last six months  Home Safety:  Patient does not have trouble with stairs inside or outside of their home  Patient has working smoke alarms and has working carbon monoxide detector  Home safety hazards include: none  Nutrition:   Current diet is Regular       Medications: Patient is currently taking over-the-counter supplements  OTC medications include: see medication list  Patient is able to manage medications  Activities of Daily Living (ADLs)/Instrumental Activities of Daily Living (IADLs):   Walk and transfer into and out of bed and chair?: Yes  Dress and groom yourself?: Yes    Bathe or shower yourself?: Yes    Feed yourself? Yes  Do your laundry/housekeeping?: Yes  Manage your money, pay your bills and track your expenses?: Yes  Make your own meals?: Yes    Do your own shopping?: Yes    Previous Hospitalizations:   Any hospitalizations or ED visits within the last 12 months?: No      Advance Care Planning:   Living will: Yes    Durable POA for healthcare: Yes    Advanced directive: Yes      Cognitive Screening:   Provider or family/friend/caregiver concerned regarding cognition?: No    PREVENTIVE SCREENINGS      Cardiovascular Screening:    General: History Lipid Disorder and Screening Current      Diabetes Screening:     General: Screening Current      Colorectal Cancer Screening:     General: History Colorectal Cancer and Screening Current      Breast Cancer Screening:     General: Patient Declines      Cervical Cancer Screening:    General: Screening Not Indicated      Osteoporosis Screening:    General: Patient Declines      Abdominal Aortic Aneurysm (AAA) Screening:        General: Screening Not Indicated      Lung Cancer Screening:     General: Screening Not Indicated      Hepatitis C Screening:    General: Screening Current    Screening, Brief Intervention, and Referral to Treatment (SBIRT)    Screening  Typical number of drinks in a day: 0  Typical number of drinks in a week: 0  Interpretation: Low risk drinking behavior      AUDIT-C Screenin) How often did you have a drink containing alcohol in the past year? never  2) How many drinks did you have on a typical day when you were drinking in the past year? never  3) How often did you have 6 or more drinks on one occasion in the past year? never    AUDIT-C Score: 0  Interpretation: Score 0-2 (female): Negative screen for alcohol misuse    Single Item Drug Screening:  How often have you used an illegal drug (including marijuana) or a prescription medication for non-medical reasons in the past year? never    Single Item Drug Screen Score: 0  Interpretation: Negative screen for possible drug use disorder    Review of Current Opioid Use    Opioid Risk Tool (ORT) Interpretation: Complete Opioid Risk Tool (ORT)    Other Counseling Topics:   Regular weightbearing exercise and calcium and vitamin D intake         Francia Olivares MD

## 2021-10-04 ENCOUNTER — CLINICAL SUPPORT (OUTPATIENT)
Dept: INTERNAL MEDICINE CLINIC | Facility: CLINIC | Age: 76
End: 2021-10-04
Payer: COMMERCIAL

## 2021-10-04 VITALS — TEMPERATURE: 98.1 F

## 2021-10-04 DIAGNOSIS — Z23 NEED FOR IMMUNIZATION AGAINST INFLUENZA: Primary | ICD-10-CM

## 2021-10-04 PROCEDURE — G0008 ADMIN INFLUENZA VIRUS VAC: HCPCS

## 2021-10-04 PROCEDURE — 90662 IIV NO PRSV INCREASED AG IM: CPT

## 2021-10-23 ENCOUNTER — IMMUNIZATIONS (OUTPATIENT)
Dept: FAMILY MEDICINE CLINIC | Facility: HOSPITAL | Age: 76
End: 2021-10-23

## 2021-10-23 DIAGNOSIS — Z23 ENCOUNTER FOR IMMUNIZATION: Primary | ICD-10-CM

## 2021-10-23 PROCEDURE — 0001A COVID-19 PFIZER VACC 0.3 ML: CPT

## 2021-10-23 PROCEDURE — 91300 COVID-19 PFIZER VACC 0.3 ML: CPT

## 2021-11-29 LAB
ALBUMIN SERPL-MCNC: 4.2 G/DL (ref 3.6–5.1)
ALBUMIN/GLOB SERPL: 1.4 (CALC) (ref 1–2.5)
ALP SERPL-CCNC: 64 U/L (ref 37–153)
ALT SERPL-CCNC: 9 U/L (ref 6–29)
AST SERPL-CCNC: 16 U/L (ref 10–35)
BILIRUB SERPL-MCNC: 0.8 MG/DL (ref 0.2–1.2)
BUN SERPL-MCNC: 12 MG/DL (ref 7–25)
BUN/CREAT SERPL: ABNORMAL (CALC) (ref 6–22)
CALCIUM SERPL-MCNC: 9.5 MG/DL (ref 8.6–10.4)
CHLORIDE SERPL-SCNC: 97 MMOL/L (ref 98–110)
CHOLEST SERPL-MCNC: 223 MG/DL
CHOLEST/HDLC SERPL: 3.4 (CALC)
CO2 SERPL-SCNC: 31 MMOL/L (ref 20–32)
CREAT SERPL-MCNC: 0.61 MG/DL (ref 0.6–0.93)
GLOBULIN SER CALC-MCNC: 3.1 G/DL (CALC) (ref 1.9–3.7)
GLUCOSE SERPL-MCNC: 92 MG/DL (ref 65–139)
HDLC SERPL-MCNC: 65 MG/DL
LDLC SERPL CALC-MCNC: 132 MG/DL (CALC)
NONHDLC SERPL-MCNC: 158 MG/DL (CALC)
POTASSIUM SERPL-SCNC: 4 MMOL/L (ref 3.5–5.3)
PROT SERPL-MCNC: 7.3 G/DL (ref 6.1–8.1)
SL AMB EGFR AFRICAN AMERICAN: 102 ML/MIN/1.73M2
SL AMB EGFR NON AFRICAN AMERICAN: 88 ML/MIN/1.73M2
SODIUM SERPL-SCNC: 137 MMOL/L (ref 135–146)
TRIGL SERPL-MCNC: 144 MG/DL
TSH SERPL-ACNC: 2.42 MIU/L (ref 0.4–4.5)

## 2021-12-03 ENCOUNTER — OFFICE VISIT (OUTPATIENT)
Dept: INTERNAL MEDICINE CLINIC | Facility: CLINIC | Age: 76
End: 2021-12-03
Payer: COMMERCIAL

## 2021-12-03 VITALS
DIASTOLIC BLOOD PRESSURE: 100 MMHG | TEMPERATURE: 97.6 F | HEIGHT: 59 IN | HEART RATE: 107 BPM | BODY MASS INDEX: 31.65 KG/M2 | WEIGHT: 157 LBS | SYSTOLIC BLOOD PRESSURE: 150 MMHG | OXYGEN SATURATION: 96 %

## 2021-12-03 DIAGNOSIS — I10 ESSENTIAL HYPERTENSION: Primary | ICD-10-CM

## 2021-12-03 DIAGNOSIS — E66.3 OVERWEIGHT: ICD-10-CM

## 2021-12-03 DIAGNOSIS — E03.9 ACQUIRED HYPOTHYROIDISM: ICD-10-CM

## 2021-12-03 DIAGNOSIS — J84.10 PULMONARY FIBROSIS, UNSPECIFIED (HCC): ICD-10-CM

## 2021-12-03 DIAGNOSIS — Z79.899 ENCOUNTER FOR LONG-TERM CURRENT USE OF MEDICATION: ICD-10-CM

## 2021-12-03 DIAGNOSIS — C18.9 MALIGNANT NEOPLASM OF COLON, UNSPECIFIED PART OF COLON (HCC): ICD-10-CM

## 2021-12-03 DIAGNOSIS — E55.9 VITAMIN D DEFICIENCY: ICD-10-CM

## 2021-12-03 DIAGNOSIS — Z12.31 ENCOUNTER FOR SCREENING MAMMOGRAM FOR BREAST CANCER: ICD-10-CM

## 2021-12-03 DIAGNOSIS — M85.89 OSTEOPENIA OF MULTIPLE SITES: ICD-10-CM

## 2021-12-03 DIAGNOSIS — Z71.9 HEALTH COUNSELING: ICD-10-CM

## 2021-12-03 DIAGNOSIS — E78.49 OTHER HYPERLIPIDEMIA: ICD-10-CM

## 2021-12-03 PROCEDURE — 1160F RVW MEDS BY RX/DR IN RCRD: CPT | Performed by: INTERNAL MEDICINE

## 2021-12-03 PROCEDURE — 3080F DIAST BP >= 90 MM HG: CPT | Performed by: INTERNAL MEDICINE

## 2021-12-03 PROCEDURE — 3077F SYST BP >= 140 MM HG: CPT | Performed by: INTERNAL MEDICINE

## 2021-12-03 PROCEDURE — 1036F TOBACCO NON-USER: CPT | Performed by: INTERNAL MEDICINE

## 2021-12-03 PROCEDURE — 99214 OFFICE O/P EST MOD 30 MIN: CPT | Performed by: INTERNAL MEDICINE

## 2022-01-06 ENCOUNTER — VBI (OUTPATIENT)
Dept: ADMINISTRATIVE | Facility: OTHER | Age: 77
End: 2022-01-06

## 2022-02-01 DIAGNOSIS — E03.9 ACQUIRED HYPOTHYROIDISM: ICD-10-CM

## 2022-02-01 DIAGNOSIS — I10 ESSENTIAL HYPERTENSION: ICD-10-CM

## 2022-02-01 RX ORDER — LEVOTHYROXINE SODIUM 0.07 MG/1
75 TABLET ORAL DAILY
Qty: 90 TABLET | Refills: 0 | Status: SHIPPED | OUTPATIENT
Start: 2022-02-01 | End: 2022-05-15 | Stop reason: SDUPTHER

## 2022-02-01 RX ORDER — AMLODIPINE BESYLATE 2.5 MG/1
2.5 TABLET ORAL DAILY
Qty: 90 TABLET | Refills: 0 | Status: SHIPPED | OUTPATIENT
Start: 2022-02-01 | End: 2022-05-15 | Stop reason: SDUPTHER

## 2022-02-01 RX ORDER — HYDROCHLOROTHIAZIDE 25 MG/1
25 TABLET ORAL DAILY
Qty: 90 TABLET | Refills: 0 | Status: SHIPPED | OUTPATIENT
Start: 2022-02-01 | End: 2022-05-15 | Stop reason: SDUPTHER

## 2022-04-23 ENCOUNTER — IMMUNIZATIONS (OUTPATIENT)
Dept: FAMILY MEDICINE CLINIC | Facility: HOSPITAL | Age: 77
End: 2022-04-23

## 2022-04-23 PROCEDURE — 0054A COVID-19 PFIZER VACC TRIS-SUCROSE GRAY CAP 0.3 ML: CPT

## 2022-04-23 PROCEDURE — 91305 COVID-19 PFIZER VACC TRIS-SUCROSE GRAY CAP 0.3 ML: CPT

## 2022-05-15 DIAGNOSIS — E03.9 ACQUIRED HYPOTHYROIDISM: ICD-10-CM

## 2022-05-15 DIAGNOSIS — I10 ESSENTIAL HYPERTENSION: ICD-10-CM

## 2022-05-16 RX ORDER — HYDROCHLOROTHIAZIDE 25 MG/1
25 TABLET ORAL DAILY
Qty: 90 TABLET | Refills: 0 | Status: SHIPPED | OUTPATIENT
Start: 2022-05-16

## 2022-05-16 RX ORDER — LEVOTHYROXINE SODIUM 0.07 MG/1
75 TABLET ORAL DAILY
Qty: 90 TABLET | Refills: 0 | Status: SHIPPED | OUTPATIENT
Start: 2022-05-16

## 2022-05-16 RX ORDER — AMLODIPINE BESYLATE 2.5 MG/1
2.5 TABLET ORAL DAILY
Qty: 90 TABLET | Refills: 0 | Status: SHIPPED | OUTPATIENT
Start: 2022-05-16

## 2022-10-28 ENCOUNTER — RA CDI HCC (OUTPATIENT)
Dept: OTHER | Facility: HOSPITAL | Age: 77
End: 2022-10-28

## 2022-10-28 NOTE — PROGRESS NOTES
[facility]  coding opportunities       Chart reviewed, no opportunity found: CHART REVIEWED, NO OPPORTUNITY FOUND        Patients Insurance     Medicare Insurance: [facility] Advantage

## 2022-11-01 DIAGNOSIS — E03.9 ACQUIRED HYPOTHYROIDISM: ICD-10-CM

## 2022-11-01 DIAGNOSIS — I10 ESSENTIAL HYPERTENSION: ICD-10-CM

## 2022-11-01 RX ORDER — LEVOTHYROXINE SODIUM 0.07 MG/1
TABLET ORAL
Qty: 90 TABLET | Refills: 0 | Status: SHIPPED | OUTPATIENT
Start: 2022-11-01

## 2022-11-01 RX ORDER — AMLODIPINE BESYLATE 2.5 MG/1
TABLET ORAL
Qty: 90 TABLET | Refills: 0 | Status: SHIPPED | OUTPATIENT
Start: 2022-11-01

## 2022-11-01 RX ORDER — HYDROCHLOROTHIAZIDE 25 MG/1
TABLET ORAL
Qty: 90 TABLET | Refills: 0 | Status: SHIPPED | OUTPATIENT
Start: 2022-11-01

## 2022-11-05 LAB
25(OH)D3 SERPL-MCNC: 75 NG/ML (ref 30–100)
ALBUMIN SERPL-MCNC: 4.2 G/DL (ref 3.6–5.1)
ALBUMIN/GLOB SERPL: 1.3 (CALC) (ref 1–2.5)
ALP SERPL-CCNC: 61 U/L (ref 37–153)
ALT SERPL-CCNC: 12 U/L (ref 6–29)
AST SERPL-CCNC: 22 U/L (ref 10–35)
BASOPHILS # BLD AUTO: 24 CELLS/UL (ref 0–200)
BASOPHILS NFR BLD AUTO: 0.3 %
BILIRUB SERPL-MCNC: 1 MG/DL (ref 0.2–1.2)
BUN SERPL-MCNC: 13 MG/DL (ref 7–25)
BUN/CREAT SERPL: 22 (CALC) (ref 6–22)
CALCIUM SERPL-MCNC: 9.3 MG/DL (ref 8.6–10.4)
CHLORIDE SERPL-SCNC: 95 MMOL/L (ref 98–110)
CHOLEST SERPL-MCNC: 221 MG/DL
CHOLEST/HDLC SERPL: 3 (CALC)
CO2 SERPL-SCNC: 32 MMOL/L (ref 20–32)
CREAT SERPL-MCNC: 0.59 MG/DL (ref 0.6–1)
EOSINOPHIL # BLD AUTO: 40 CELLS/UL (ref 15–500)
EOSINOPHIL NFR BLD AUTO: 0.5 %
ERYTHROCYTE [DISTWIDTH] IN BLOOD BY AUTOMATED COUNT: 13.1 % (ref 11–15)
GFR/BSA.PRED SERPLBLD CYS-BASED-ARV: 93 ML/MIN/1.73M2
GLOBULIN SER CALC-MCNC: 3.2 G/DL (CALC) (ref 1.9–3.7)
GLUCOSE SERPL-MCNC: 85 MG/DL (ref 65–99)
HCT VFR BLD AUTO: 44.3 % (ref 35–45)
HDLC SERPL-MCNC: 74 MG/DL
HGB BLD-MCNC: 14.6 G/DL (ref 11.7–15.5)
LDLC SERPL CALC-MCNC: 127 MG/DL (CALC)
LYMPHOCYTES # BLD AUTO: 2338 CELLS/UL (ref 850–3900)
LYMPHOCYTES NFR BLD AUTO: 29.6 %
MCH RBC QN AUTO: 26.7 PG (ref 27–33)
MCHC RBC AUTO-ENTMCNC: 33 G/DL (ref 32–36)
MCV RBC AUTO: 81.1 FL (ref 80–100)
MONOCYTES # BLD AUTO: 545 CELLS/UL (ref 200–950)
MONOCYTES NFR BLD AUTO: 6.9 %
NEUTROPHILS # BLD AUTO: 4953 CELLS/UL (ref 1500–7800)
NEUTROPHILS NFR BLD AUTO: 62.7 %
NONHDLC SERPL-MCNC: 147 MG/DL (CALC)
PLATELET # BLD AUTO: 265 THOUSAND/UL (ref 140–400)
PMV BLD REES-ECKER: 9.9 FL (ref 7.5–12.5)
POTASSIUM SERPL-SCNC: 3.7 MMOL/L (ref 3.5–5.3)
PROT SERPL-MCNC: 7.4 G/DL (ref 6.1–8.1)
RBC # BLD AUTO: 5.46 MILLION/UL (ref 3.8–5.1)
SODIUM SERPL-SCNC: 134 MMOL/L (ref 135–146)
TRIGL SERPL-MCNC: 92 MG/DL
VIT B12 SERPL-MCNC: 828 PG/ML (ref 200–1100)
WBC # BLD AUTO: 7.9 THOUSAND/UL (ref 3.8–10.8)

## 2022-11-07 ENCOUNTER — APPOINTMENT (OUTPATIENT)
Dept: LAB | Facility: CLINIC | Age: 77
End: 2022-11-07

## 2022-11-07 ENCOUNTER — OFFICE VISIT (OUTPATIENT)
Dept: INTERNAL MEDICINE CLINIC | Facility: CLINIC | Age: 77
End: 2022-11-07

## 2022-11-07 VITALS
SYSTOLIC BLOOD PRESSURE: 120 MMHG | BODY MASS INDEX: 29.64 KG/M2 | WEIGHT: 147 LBS | HEART RATE: 86 BPM | OXYGEN SATURATION: 98 % | DIASTOLIC BLOOD PRESSURE: 70 MMHG | HEIGHT: 59 IN | TEMPERATURE: 97.8 F

## 2022-11-07 DIAGNOSIS — J84.10 PULMONARY FIBROSIS, UNSPECIFIED (HCC): ICD-10-CM

## 2022-11-07 DIAGNOSIS — C18.9 MALIGNANT NEOPLASM OF COLON, UNSPECIFIED PART OF COLON (HCC): ICD-10-CM

## 2022-11-07 DIAGNOSIS — E66.3 OVERWEIGHT: ICD-10-CM

## 2022-11-07 DIAGNOSIS — E03.9 ACQUIRED HYPOTHYROIDISM: ICD-10-CM

## 2022-11-07 DIAGNOSIS — Z23 NEED FOR INFLUENZA VACCINATION: ICD-10-CM

## 2022-11-07 DIAGNOSIS — M85.89 OSTEOPENIA OF MULTIPLE SITES: ICD-10-CM

## 2022-11-07 DIAGNOSIS — E78.49 OTHER HYPERLIPIDEMIA: ICD-10-CM

## 2022-11-07 DIAGNOSIS — I10 ESSENTIAL HYPERTENSION: Primary | ICD-10-CM

## 2022-11-07 DIAGNOSIS — Z00.00 HEALTH MAINTENANCE EXAMINATION: ICD-10-CM

## 2022-11-07 LAB — TSH SERPL DL<=0.05 MIU/L-ACNC: 1.62 UIU/ML (ref 0.45–4.5)

## 2022-11-07 RX ORDER — ATORVASTATIN CALCIUM 10 MG/1
10 TABLET, FILM COATED ORAL DAILY
Qty: 90 TABLET | Refills: 1 | Status: SHIPPED | OUTPATIENT
Start: 2022-11-07

## 2022-11-07 NOTE — PROGRESS NOTES
Assessment and Plan:     Problem List Items Addressed This Visit        Digestive    Malignant neoplasm of colon ([facility] [address] )     Colonoscopy due 2024  Endocrine    Acquired hypothyroidism     Labs due  Relevant Orders    TSH, 3rd generation with Free T4 reflex       Respiratory    Pulmonary fibrosis, unspecified (HCC)     Stable, minimal symptoms  Cardiovascular and Mediastinum    Essential hypertension - Primary     On HCTZ, amlodipine  Home BP readings normal             Musculoskeletal and Integument    Osteopenia of multiple sites     Continue daily walks and supplements  Other    Other hyperlipidemia     Lipids remained elevated  Agrees to start low dose statin  Relevant Medications    atorvastatin (LIPITOR) 10 mg tablet    Overweight     Weight loss of 10 lbs since last visit  Other Visit Diagnoses     Need for influenza vaccination        Relevant Orders    influenza vaccine, high-dose, PF 0 7 mL (FLUZONE HIGH-DOSE) (Completed)    Health maintenance examination        Flu vaccine today  BMI Counseling: Body mass index is 29 69 kg/m²  The BMI is above normal  Nutrition recommendations include encouraging healthy choices of fruits and vegetables and moderation in carbohydrate intake  Rationale for BMI follow-up plan is due to patient being overweight or obese  Depression Screening and Follow-up Plan: Patient was screened for depression during today's encounter  They screened negative with a PHQ-2 score of 0  Preventive health issues were discussed with patient, and age appropriate screening tests were ordered as noted in patient's After Visit Summary  Personalized health advice and appropriate referrals for health education or preventive services given if needed, as noted in patient's After Visit Summary  History of Present Illness:     Patient presents for a Medicare Wellness Visit and follow up visit      She reports feeling very stressed when her  was in the hospital for a heart attack  She feels well, has been watching her diet  Her cholesterol remains high and says it is due to all the ham she eats in [place]  Denies any chest pain, shortness of breath or other symptoms  Patient Care Team:  Cynthia An MD as PCP - Aundrea Adair MD as PCP - PCP-[facility] MD Dawn Jiménez MD Bennetta Brandt, MD Reginold Spates, MD as Endoscopist     Review of Systems:     Review of Systems   Constitutional: Negative for activity change, appetite change and fatigue  HENT: Negative for congestion, ear pain and postnasal drip  Eyes: Negative for visual disturbance  Respiratory: Negative for cough and shortness of breath  Cardiovascular: Negative for chest pain and leg swelling  Gastrointestinal: Negative for abdominal pain, constipation and diarrhea  Genitourinary: Negative for dysuria, frequency and urgency  Musculoskeletal: Negative for arthralgias and myalgias  Skin: Negative for rash and wound  Neurological: Negative for dizziness, numbness and headaches  Psychiatric/Behavioral: Negative for sleep disturbance  The patient is not nervous/anxious  Problem List:     Patient Active Problem List   Diagnosis   • Diverticulosis   • Other hyperlipidemia   • Essential hypertension   • Acquired hypothyroidism   • Internal hemorrhoids   • Iron deficiency anemia   • Malignant neoplasm of colon ([facility] )   • Neuropathy   • Vitamin D deficiency   • Osteopenia of multiple sites   • Overweight   • Diverticulosis of intestine without bleeding   • Seasonal allergies   • Pulmonary fibrosis, unspecified ([facility] )      Past Medical and Surgical History:     Past Medical History:   Diagnosis Date   • Anxiety     resolved:  Oct 23, 2017   • Colon cancer [facility])    • Disease of thyroid gland     hypothyroidism   • History of Helicobacter infection     onset: 6/14   • Hypertension    • Obesity      Past Surgical History:   Procedure Laterality Date   • COLECTOMY      Partial    • COLON SURGERY      right hemicolectomy 2014   • COLON SURGERY     • COLONOSCOPY W/ POLYPECTOMY     • ESOPHAGOGASTRODUODENOSCOPY      Diagnostic - 6/14 [place]    • HERNIA REPAIR      right inguinal hernia repair 1989   • [place] COLONOSCOPY FLX DX W/COLLJ SPEC WHEN PFRMD N/A 1/11/2016    Procedure: COLONOSCOPY;  Surgeon: Pj Pitts MD;  [facility]; Service: Gastroenterology   • [place] COLONOSCOPY FLX DX W/COLLJ SPEC WHEN PFRMD N/A 3/11/2019    Procedure: COLONOSCOPY;  Surgeon: Pj Pitts MD;  [facility];   Service: Gastroenterology   • TONSILLECTOMY      as a child      Family History:     Family History   Problem Relation Age of Onset   • Hypertension Mother    • Alcohol abuse Father    • Alzheimer's disease Father    • Hyperlipidemia Sister         3 living sisters    • No Known Problems Brother    • No Known Problems Maternal Grandmother    • No Known Problems Maternal Grandfather    • No Known Problems Paternal Grandmother    • No Known Problems Paternal Grandfather       Social History:     Social History     Socioeconomic History   • Marital status: /Civil Union     Spouse name: None   • Number of children: None   • Years of education: None   • Highest education level: None   Occupational History   • Occupation: Retired    Tobacco Use   • Smoking status: Never Smoker   • Smokeless tobacco: Never Used   Substance and Sexual Activity   • Alcohol use: No   • Drug use: No   • Sexual activity: None   Other Topics Concern   • None   Social History Narrative    Denied: History of exercise habits         From [place], visits yearly     Social Determinants of Health     Financial Resource Strain: Low Risk    • Difficulty of Paying Living Expenses: Not hard at all   Food Insecurity: Not on file   Transportation Needs: No Transportation Needs   • Lack of Transportation (Medical): No   • Lack of Transportation (Non-Medical): No   Physical Activity: Not on file   Stress: Not on file   Social Connections: Not on file   Intimate Partner Violence: Not on file   Housing Stability: Not on file      Medications and Allergies:     Current Outpatient Medications   Medication Sig Dispense Refill   • atorvastatin (LIPITOR) 10 mg tablet Take 1 tablet (10 mg total) by mouth daily 90 tablet 1   • amLODIPine (NORVASC) 2 5 mg tablet TAKE 1 TABLET BY MOUTH IN THE MORNING 90 tablet 0   • Calcium Carb-Cholecalciferol 1000-800 MG-UNIT TABS Take 1 tablet by mouth daily     • Garlic 8600 MG CAPS Take 1,000 mg by mouth daily  • hydrochlorothiazide (HYDRODIURIL) 25 mg tablet TAKE 1 TABLET BY MOUTH IN THE MORNING 90 tablet 0   • levothyroxine 75 mcg tablet TAKE 1 TABLET BY MOUTH IN THE MORNING 90 tablet 0   • Loratadine 10 MG CAPS      • Multiple Vitamins-Minerals (MULTIVITAMIN WITH MINERALS) tablet Take 1 tablet by mouth daily  No current facility-administered medications for this visit  No Known Allergies   Immunizations:     Immunization History   Administered Date(s) Administered   • COVID-19 PFIZER VACCINE 0 3 ML IM 01/21/2021, 02/10/2021, 10/23/2021   • COVID-19 Pfizer vac (Germán-sucrose, gray cap) 12 yr+ IM 04/23/2022   • Influenza Split High Dose Preservative Free IM 10/02/2013, 10/03/2015, 10/15/2016, 10/16/2017   • Influenza, high dose seasonal 0 7 mL 10/22/2018, 10/22/2019, 10/05/2020, 10/04/2021, 11/07/2022   • Pneumococcal Conjugate 13-Valent 10/03/2015   • Pneumococcal Polysaccharide PPV23 10/02/2012      Health Maintenance:         Topic Date Due   • Breast Cancer Screening: Mammogram  11/14/2019   • Hepatitis C Screening  Completed     There are no preventive care reminders to display for this patient  Medicare Screening Tests and Risk Assessments:     Kirill Hernandez is here for her Subsequent Wellness visit  Health Risk Assessment:   Patient rates overall health as good   Patient feels that their physical health rating is slightly better  Patient is very satisfied with their life  Eyesight was rated as same  Hearing was rated as same  Patient feels that their emotional and mental health rating is same  Patients states they are never, rarely angry  Patient states they are never, rarely unusually tired/fatigued  Pain experienced in the last 7 days has been none  Patient states that she has experienced no weight loss or gain in last 6 months  Depression Screening:   PHQ-2 Score: 0      Fall Risk Screening: In the past year, patient has experienced: no history of falling in past year      Urinary Incontinence Screening:   Patient has not leaked urine accidently in the last six months  Home Safety:  Patient does not have trouble with stairs inside or outside of their home  Patient has working smoke alarms and has no working carbon monoxide detector  Home safety hazards include: none  Nutrition:   Current diet is Regular  Medications:   Patient is currently taking over-the-counter supplements  OTC medications include: see medication list  Patient is able to manage medications  Activities of Daily Living (ADLs)/Instrumental Activities of Daily Living (IADLs):   Walk and transfer into and out of bed and chair?: Yes  Dress and groom yourself?: Yes    Bathe or shower yourself?: Yes    Feed yourself? Yes  Do your laundry/housekeeping?: Yes  Manage your money, pay your bills and track your expenses?: Yes  Make your own meals?: Yes    Do your own shopping?: Yes    Previous Hospitalizations:   Any hospitalizations or ED visits within the last 12 months?: No      Advance Care Planning:   Living will: Yes    Durable POA for healthcare:  Yes    Advanced directive: Yes      PREVENTIVE SCREENINGS      Cardiovascular Screening:    General: Screening Not Indicated and History Lipid Disorder      Diabetes Screening:     General: Screening Current      Colorectal Cancer Screening:     General: History Colorectal Cancer      Cervical Cancer Screening:    General: Screening Not Indicated      Lung Cancer Screening:     General: Screening Not Indicated      Hepatitis C Screening:    General: Screening Current    Screening, Brief Intervention, and Referral to Treatment (SBIRT)    Screening      AUDIT-C Screenin) How often did you have a drink containing alcohol in the past year? never  2) How many drinks did you have on a typical day when you were drinking in the past year? 0  3) How often did you have 6 or more drinks on one occasion in the past year? never    AUDIT-C Score: 0  Interpretation: Score 0-2 (female): Negative screen for alcohol misuse    Single Item Drug Screening:  How often have you used an illegal drug (including marijuana) or a prescription medication for non-medical reasons in the past year? never    Single Item Drug Screen Score: 0  Interpretation: Negative screen for possible drug use disorder    No exam data present     Physical Exam:     /70 (BP Location: Left arm, Patient Position: Sitting, Cuff Size: Adult)   Pulse 86   Temp 97 8 °F (36 6 °C)   Ht 4' 11" (1 499 m)   Wt 66 7 kg (147 lb)   SpO2 98%   BMI 29 69 kg/m²     Physical Exam  Vitals and nursing note reviewed  Constitutional:       General: She is not in acute distress  Appearance: She is well-developed  HENT:      Head: Normocephalic and atraumatic  Eyes:      Conjunctiva/sclera: Conjunctivae normal       Pupils: Pupils are equal, round, and reactive to light  Cardiovascular:      Rate and Rhythm: Normal rate and regular rhythm  Heart sounds: No murmur heard  Pulmonary:      Effort: Pulmonary effort is normal  No respiratory distress  Breath sounds: Normal breath sounds  Abdominal:      General: Bowel sounds are normal       Palpations: Abdomen is soft  Tenderness: There is no abdominal tenderness  Musculoskeletal:         General: Normal range of motion        Cervical back: Normal range of motion and neck supple  Right lower leg: No edema  Left lower leg: No edema  Skin:     General: Skin is warm and dry  Neurological:      General: No focal deficit present  Mental Status: She is alert and oriented to person, place, and time  Psychiatric:         Mood and Affect: Mood and affect normal          Behavior: Behavior normal           Geoffrey Koch MD     Labs & imaging results reviewed with patient

## 2022-11-18 ENCOUNTER — VBI (OUTPATIENT)
Dept: ADMINISTRATIVE | Facility: OTHER | Age: 77
End: 2022-11-18

## 2022-11-18 NOTE — TELEPHONE ENCOUNTER
11/18/22 11:51 AM     [employer] SmartForm used to document caregap status      Roderickgerri Francisco

## 2022-12-21 ENCOUNTER — VBI (OUTPATIENT)
Dept: ADMINISTRATIVE | Facility: OTHER | Age: 77
End: 2022-12-21

## 2023-02-13 DIAGNOSIS — E78.49 OTHER HYPERLIPIDEMIA: ICD-10-CM

## 2023-02-13 DIAGNOSIS — I10 ESSENTIAL HYPERTENSION: ICD-10-CM

## 2023-02-13 DIAGNOSIS — E03.9 ACQUIRED HYPOTHYROIDISM: ICD-10-CM

## 2023-02-14 RX ORDER — LEVOTHYROXINE SODIUM 0.07 MG/1
TABLET ORAL
Qty: 90 TABLET | Refills: 0 | Status: SHIPPED | OUTPATIENT
Start: 2023-02-14

## 2023-02-14 RX ORDER — AMLODIPINE BESYLATE 2.5 MG/1
TABLET ORAL
Qty: 90 TABLET | Refills: 0 | Status: SHIPPED | OUTPATIENT
Start: 2023-02-14

## 2023-02-14 RX ORDER — HYDROCHLOROTHIAZIDE 25 MG/1
TABLET ORAL
Qty: 90 TABLET | Refills: 0 | Status: SHIPPED | OUTPATIENT
Start: 2023-02-14

## 2023-02-14 RX ORDER — ATORVASTATIN CALCIUM 10 MG/1
10 TABLET, FILM COATED ORAL DAILY
Qty: 90 TABLET | Refills: 0 | Status: SHIPPED | OUTPATIENT
Start: 2023-02-14

## 2023-02-14 RX ORDER — LEVOTHYROXINE SODIUM 0.07 MG/1
75 TABLET ORAL EVERY MORNING
Qty: 90 TABLET | Refills: 0 | Status: SHIPPED | OUTPATIENT
Start: 2023-02-14

## 2023-02-14 RX ORDER — AMLODIPINE BESYLATE 2.5 MG/1
2.5 TABLET ORAL EVERY MORNING
Qty: 90 TABLET | Refills: 0 | Status: SHIPPED | OUTPATIENT
Start: 2023-02-14

## 2023-02-14 RX ORDER — HYDROCHLOROTHIAZIDE 25 MG/1
25 TABLET ORAL EVERY MORNING
Qty: 90 TABLET | Refills: 0 | Status: SHIPPED | OUTPATIENT
Start: 2023-02-14

## 2023-04-30 DIAGNOSIS — E78.49 OTHER HYPERLIPIDEMIA: ICD-10-CM

## 2023-04-30 DIAGNOSIS — E03.9 ACQUIRED HYPOTHYROIDISM: ICD-10-CM

## 2023-04-30 DIAGNOSIS — I10 ESSENTIAL HYPERTENSION: ICD-10-CM

## 2023-05-01 ENCOUNTER — TELEPHONE (OUTPATIENT)
Dept: INTERNAL MEDICINE CLINIC | Facility: CLINIC | Age: 78
End: 2023-05-01

## 2023-05-01 DIAGNOSIS — E78.49 OTHER HYPERLIPIDEMIA: ICD-10-CM

## 2023-05-01 DIAGNOSIS — E03.9 ACQUIRED HYPOTHYROIDISM: Primary | ICD-10-CM

## 2023-05-01 DIAGNOSIS — I10 ESSENTIAL HYPERTENSION: ICD-10-CM

## 2023-05-01 RX ORDER — HYDROCHLOROTHIAZIDE 25 MG/1
25 TABLET ORAL EVERY MORNING
Qty: 90 TABLET | Refills: 0 | Status: SHIPPED | OUTPATIENT
Start: 2023-05-01

## 2023-05-01 RX ORDER — LEVOTHYROXINE SODIUM 0.07 MG/1
75 TABLET ORAL EVERY MORNING
Qty: 90 TABLET | Refills: 0 | Status: SHIPPED | OUTPATIENT
Start: 2023-05-01

## 2023-05-01 RX ORDER — AMLODIPINE BESYLATE 2.5 MG/1
2.5 TABLET ORAL EVERY MORNING
Qty: 90 TABLET | Refills: 0 | Status: SHIPPED | OUTPATIENT
Start: 2023-05-01

## 2023-05-01 RX ORDER — ATORVASTATIN CALCIUM 10 MG/1
10 TABLET, FILM COATED ORAL DAILY
Qty: 90 TABLET | Refills: 0 | Status: SHIPPED | OUTPATIENT
Start: 2023-05-01

## 2023-05-04 ENCOUNTER — APPOINTMENT (OUTPATIENT)
Dept: LAB | Facility: CLINIC | Age: 78
End: 2023-05-04

## 2023-05-04 LAB
ALBUMIN SERPL BCP-MCNC: 4.2 G/DL (ref 3.5–5)
ALP SERPL-CCNC: 56 U/L (ref 34–104)
ALT SERPL W P-5'-P-CCNC: 11 U/L (ref 7–52)
ANION GAP SERPL CALCULATED.3IONS-SCNC: 6 MMOL/L (ref 4–13)
AST SERPL W P-5'-P-CCNC: 17 U/L (ref 13–39)
BASOPHILS # BLD AUTO: 0.04 THOUSANDS/ΜL (ref 0–0.1)
BASOPHILS NFR BLD AUTO: 0 % (ref 0–1)
BILIRUB SERPL-MCNC: 0.87 MG/DL (ref 0.2–1)
BUN SERPL-MCNC: 10 MG/DL (ref 5–25)
CALCIUM SERPL-MCNC: 9.2 MG/DL (ref 8.4–10.2)
CHLORIDE SERPL-SCNC: 97 MMOL/L (ref 96–108)
CHOLEST SERPL-MCNC: 145 MG/DL
CO2 SERPL-SCNC: 31 MMOL/L (ref 21–32)
CREAT SERPL-MCNC: 0.56 MG/DL (ref 0.6–1.3)
EOSINOPHIL # BLD AUTO: 0.07 THOUSAND/ΜL (ref 0–0.61)
EOSINOPHIL NFR BLD AUTO: 1 % (ref 0–6)
ERYTHROCYTE [DISTWIDTH] IN BLOOD BY AUTOMATED COUNT: 13.7 % (ref 11.6–15.1)
GFR SERPL CREATININE-BSD FRML MDRD: 89 ML/MIN/1.73SQ M
GLUCOSE P FAST SERPL-MCNC: 92 MG/DL (ref 65–99)
HCT VFR BLD AUTO: 47.5 % (ref 34.8–46.1)
HDLC SERPL-MCNC: 69 MG/DL
HGB BLD-MCNC: 15.2 G/DL (ref 11.5–15.4)
IMM GRANULOCYTES # BLD AUTO: 0.02 THOUSAND/UL (ref 0–0.2)
IMM GRANULOCYTES NFR BLD AUTO: 0 % (ref 0–2)
LDLC SERPL CALC-MCNC: 58 MG/DL (ref 0–100)
LYMPHOCYTES # BLD AUTO: 2.56 THOUSANDS/ΜL (ref 0.6–4.47)
LYMPHOCYTES NFR BLD AUTO: 29 % (ref 14–44)
MCH RBC QN AUTO: 27.5 PG (ref 26.8–34.3)
MCHC RBC AUTO-ENTMCNC: 32 G/DL (ref 31.4–37.4)
MCV RBC AUTO: 86 FL (ref 82–98)
MONOCYTES # BLD AUTO: 0.58 THOUSAND/ΜL (ref 0.17–1.22)
MONOCYTES NFR BLD AUTO: 7 % (ref 4–12)
NEUTROPHILS # BLD AUTO: 5.65 THOUSANDS/ΜL (ref 1.85–7.62)
NEUTS SEG NFR BLD AUTO: 63 % (ref 43–75)
NONHDLC SERPL-MCNC: 76 MG/DL
NRBC BLD AUTO-RTO: 0 /100 WBCS
PLATELET # BLD AUTO: 246 THOUSANDS/UL (ref 149–390)
PMV BLD AUTO: 9.1 FL (ref 8.9–12.7)
POTASSIUM SERPL-SCNC: 3.8 MMOL/L (ref 3.5–5.3)
PROT SERPL-MCNC: 7.8 G/DL (ref 6.4–8.4)
RBC # BLD AUTO: 5.52 MILLION/UL (ref 3.81–5.12)
SODIUM SERPL-SCNC: 134 MMOL/L (ref 135–147)
T4 FREE SERPL-MCNC: 1.09 NG/DL (ref 0.76–1.46)
TRIGL SERPL-MCNC: 88 MG/DL
TSH SERPL DL<=0.05 MIU/L-ACNC: 5.09 UIU/ML (ref 0.45–4.5)
WBC # BLD AUTO: 8.92 THOUSAND/UL (ref 4.31–10.16)

## 2023-05-08 ENCOUNTER — OFFICE VISIT (OUTPATIENT)
Dept: INTERNAL MEDICINE CLINIC | Facility: CLINIC | Age: 78
End: 2023-05-08

## 2023-05-08 VITALS
WEIGHT: 152 LBS | DIASTOLIC BLOOD PRESSURE: 88 MMHG | BODY MASS INDEX: 30.64 KG/M2 | OXYGEN SATURATION: 97 % | HEIGHT: 59 IN | SYSTOLIC BLOOD PRESSURE: 160 MMHG | HEART RATE: 84 BPM | RESPIRATION RATE: 18 BRPM

## 2023-05-08 DIAGNOSIS — I10 ESSENTIAL HYPERTENSION: Primary | ICD-10-CM

## 2023-05-08 DIAGNOSIS — C18.9 MALIGNANT NEOPLASM OF COLON, UNSPECIFIED PART OF COLON (HCC): ICD-10-CM

## 2023-05-08 DIAGNOSIS — M85.89 OSTEOPENIA OF MULTIPLE SITES: ICD-10-CM

## 2023-05-08 DIAGNOSIS — Z71.9 HEALTH COUNSELING: ICD-10-CM

## 2023-05-08 DIAGNOSIS — E78.49 OTHER HYPERLIPIDEMIA: ICD-10-CM

## 2023-05-08 DIAGNOSIS — E03.9 ACQUIRED HYPOTHYROIDISM: ICD-10-CM

## 2023-05-08 DIAGNOSIS — E66.3 OVERWEIGHT: ICD-10-CM

## 2023-05-08 DIAGNOSIS — J84.10 PULMONARY FIBROSIS, UNSPECIFIED (HCC): ICD-10-CM

## 2023-05-08 DIAGNOSIS — E55.9 VITAMIN D DEFICIENCY: ICD-10-CM

## 2023-05-08 NOTE — PROGRESS NOTES
Assessment/Plan:    Acquired hypothyroidism  Stable, no medication changes  Essential hypertension  Repeat BP remains elevated  She reports home BP readings normal   On low dose amlodipine and HCTZ  Other hyperlipidemia  Lipids improved, on atorvastatin 10 mg daily  Pulmonary fibrosis, unspecified (HCC)  Stable, no symptoms  Overweight  Weight gain 5 lbs since last visit  Malignant neoplasm of colon [facility])  Sees oncology  Colonoscopy due next year  Osteopenia of multiple sites  Continue daily walks and supplements  Declined further imaging  Diagnoses and all orders for this visit:    Essential hypertension  -     CBC and differential; Future    Acquired hypothyroidism  -     TSH, 3rd generation with Free T4 reflex; Future    Osteopenia of multiple sites    Other hyperlipidemia  -     Comprehensive metabolic panel; Future  -     Lipid panel; Future    Overweight    Pulmonary fibrosis, unspecified (HCC)    Vitamin D deficiency  -     Vitamin D 25 hydroxy; Future    Malignant neoplasm of colon, unspecified part of colon [facility])    Health counseling  Comments:  Declined further screening  Follow up in 6 months or as needed  Subjective:      Patient ID: Irene Diallo is a 66 y o  female here     She feels well, no complaints  She had gained some of the weight she lost  She tries to walk regularly  No falls  She reports home BP readings remains normal   Has any chest pain, shortness of breath, headaches or dizziness  Moves her bowels regularly, no urinary symptoms  She will be leaving for [place] next month, staying for 4 months  The following portions of the patient's history were reviewed and updated as appropriate: allergies, current medications, past medical history, past social history and problem list     Review of Systems   Constitutional: Negative for appetite change and fatigue  HENT: Negative for congestion, ear pain and postnasal drip      Eyes: Negative for visual "disturbance  Respiratory: Negative for cough and shortness of breath  Cardiovascular: Negative for chest pain and leg swelling  Gastrointestinal: Negative for abdominal pain, constipation and diarrhea  Genitourinary: Negative for dysuria and frequency  Musculoskeletal: Negative for arthralgias and myalgias  Skin: Negative for rash and wound  Neurological: Negative for dizziness, numbness and headaches  Psychiatric/Behavioral: Negative for confusion and dysphoric mood  The patient is not nervous/anxious  Objective:      /88   Pulse 84   Resp 18   Ht 4' 11\" (1 499 m)   Wt 68 9 kg (152 lb)   SpO2 97%   BMI 30 70 kg/m²          Physical Exam  Vitals and nursing note reviewed  Constitutional:       General: She is not in acute distress  Appearance: She is well-developed  HENT:      Head: Normocephalic and atraumatic  Eyes:      Pupils: Pupils are equal, round, and reactive to light  Cardiovascular:      Rate and Rhythm: Normal rate and regular rhythm  Heart sounds: Normal heart sounds  Pulmonary:      Effort: Pulmonary effort is normal       Breath sounds: Normal breath sounds  No wheezing  Abdominal:      General: Bowel sounds are normal       Palpations: Abdomen is soft  Musculoskeletal:         General: No swelling  Right lower leg: No edema  Left lower leg: No edema  Skin:     General: Skin is warm  Findings: No rash  Neurological:      General: No focal deficit present  Mental Status: She is alert and oriented to person, place, and time  Psychiatric:         Mood and Affect: Mood and affect normal  Mood is not anxious or depressed  Behavior: Behavior normal            Labs & imaging results reviewed with patient      "

## 2023-06-04 ENCOUNTER — OFFICE VISIT (OUTPATIENT)
Dept: URGENT CARE | Facility: CLINIC | Age: 78
End: 2023-06-04
Payer: COMMERCIAL

## 2023-06-04 VITALS
RESPIRATION RATE: 16 BRPM | HEART RATE: 87 BPM | DIASTOLIC BLOOD PRESSURE: 81 MMHG | SYSTOLIC BLOOD PRESSURE: 143 MMHG | TEMPERATURE: 98 F | OXYGEN SATURATION: 95 %

## 2023-06-04 DIAGNOSIS — R43.8 BITTER TASTE: Primary | ICD-10-CM

## 2023-06-04 DIAGNOSIS — R11.0 NAUSEA: ICD-10-CM

## 2023-06-04 PROCEDURE — 99213 OFFICE O/P EST LOW 20 MIN: CPT | Performed by: NURSE PRACTITIONER

## 2023-06-04 NOTE — PROGRESS NOTES
[facility]        NAME: Iris Hernandes is a 66 y o  female  : 1945    MRN: [id number]  DATE: 2023  TIME: 1:06 PM    Assessment and Plan   Bitter taste [R43 8]  1  Bitter taste        2  Nausea          --Suspected silent reflux, but cannot r/o PND/sinus drainage, other  Address per below  Declines need for anti-emetic at this time  Patient Instructions     --Trial of OTC Pepcid twice a day (take last dose close to bedtime) x 5-7 days  --Avoid spicy, greasy, acidic foods  Avoid eating within 3 hours of bedtime  --Continue OTC sinus medication  --Follow-up with PCP and/or ENT for ongoing/worsening symptoms over the next week    Chief Complaint     Chief Complaint   Patient presents with   • Medical Problem     Pt reports bitter taste in mouth x 3-4 days and decreased appetite, also reports increased burping  History of Present Illness       Here with complaints of bitter taste in mouth, decreased appetite, mild intermittent nausea x 3-4 days  Some burping, but no heartburn sensation per se'  No abdominal pain, vomiting  Mild sinus congestion, PND, for which she takes medication  Does not believe this to be the cause of her symptoms, however  Admits to being a bit nervous lately due to upcoming overseas trip  Had pizza the other night which she thinks may be a factor  No recent dietary changes, new medications, mouthwashes, toothpaste, other  Review of Systems   Review of Systems   Constitutional: Negative for fever  HENT: Negative for sore throat and voice change  Respiratory: Negative for shortness of breath  Cardiovascular: Negative for chest pain  Gastrointestinal: Positive for nausea  Negative for abdominal pain and vomiting           Current Medications       Current Outpatient Medications:   •  amLODIPine (NORVASC) 2 5 mg tablet, Take 1 tablet (2 5 mg total) by mouth every morning, Disp: 90 tablet, Rfl: 0  •  amLODIPine (NORVASC) 2 5 mg tablet, Take 1 tablet (2 5 mg total) by mouth every morning, Disp: 90 tablet, Rfl: 0  •  atorvastatin (LIPITOR) 10 mg tablet, Take 1 tablet (10 mg total) by mouth daily, Disp: 90 tablet, Rfl: 0  •  Calcium Carb-Cholecalciferol 1000-800 MG-UNIT TABS, Take 1 tablet by mouth daily, Disp: , Rfl:   •  Garlic 8196 MG CAPS, Take 1,000 mg by mouth daily  , Disp: , Rfl:   •  hydrochlorothiazide (HYDRODIURIL) 25 mg tablet, Take 1 tablet (25 mg total) by mouth every morning, Disp: 90 tablet, Rfl: 0  •  hydrochlorothiazide (HYDRODIURIL) 25 mg tablet, Take 1 tablet (25 mg total) by mouth every morning, Disp: 90 tablet, Rfl: 0  •  levothyroxine 75 mcg tablet, Take 1 tablet (75 mcg total) by mouth every morning, Disp: 90 tablet, Rfl: 0  •  levothyroxine 75 mcg tablet, Take 1 tablet (75 mcg total) by mouth every morning, Disp: 90 tablet, Rfl: 0  •  Loratadine 10 MG CAPS, , Disp: , Rfl:   •  Multiple Vitamins-Minerals (MULTIVITAMIN WITH MINERALS) tablet, Take 1 tablet by mouth daily  , Disp: , Rfl:     Current Allergies     Allergies as of 06/04/2023   • (No Known Allergies)            The following portions of the patient's history were reviewed and updated as appropriate: allergies, current medications, past family history, past medical history, past social history, past surgical history and problem list      Past Medical History:   Diagnosis Date   • Anemia 06/12/2014    Transfused  3  units  of  blood   • Anxiety     resolved:  Oct 23, 2017   • Cancer [facility])    • Colon cancer [facility])    • Disease of thyroid gland     hypothyroidism   • History of Helicobacter infection     onset: 6/14   • Hypertension    • Obesity        Past Surgical History:   Procedure Laterality Date   • BREAST SURGERY     • COLECTOMY      Partial    • COLON SURGERY      right hemicolectomy 2014   • COLON SURGERY     • COLONOSCOPY W/ POLYPECTOMY     • ESOPHAGOGASTRODUODENOSCOPY      Diagnostic - 6/14 in [facility]    • HERNIA REPAIR      right inguinal hernia repair 1989   • [place] COLONOSCOPY FLX DX W/COLLJ SPEC WHEN PFRMD N/A 01/11/2016    Procedure: COLONOSCOPY;  Surgeon: Hollis Galindo MD;  [facility]; Service: Gastroenterology   • [place] COLONOSCOPY FLX DX W/COLLJ SPEC WHEN PFRMD N/A 03/11/2019    Procedure: COLONOSCOPY;  Surgeon: Hollis Galindo MD;  [facility]; Service: Gastroenterology   • TONSILLECTOMY      as a child       Family History   Problem Relation Age of Onset   • Hypertension Mother    • Alcohol abuse Father    • Alzheimer's disease Father    • Hyperlipidemia Sister         3 living sisters    • No Known Problems Brother    • No Known Problems Maternal Grandmother    • No Known Problems Maternal Grandfather    • No Known Problems Paternal Grandmother    • No Known Problems Paternal Grandfather          Medications have been verified  Objective   /81   Pulse 87   Temp 98 °F (36 7 °C)   Resp 16   SpO2 95%   No LMP recorded  Patient is postmenopausal        Physical Exam     Physical Exam  Constitutional:       General: She is not in acute distress  HENT:      Nose: No congestion or rhinorrhea  Comments: No sinus tenderness  Mouth/Throat:      Mouth: Mucous membranes are moist       Pharynx: Oropharynx is clear  No oropharyngeal exudate or posterior oropharyngeal erythema  Cardiovascular:      Rate and Rhythm: Normal rate and regular rhythm  Pulmonary:      Effort: Pulmonary effort is normal       Breath sounds: Normal breath sounds  Abdominal:      General: Abdomen is flat  Tenderness: There is no abdominal tenderness  Musculoskeletal:      Cervical back: No tenderness  Lymphadenopathy:      Cervical: No cervical adenopathy  Neurological:      Mental Status: She is alert     Psychiatric:         Mood and Affect: Mood normal

## 2023-06-04 NOTE — PATIENT INSTRUCTIONS
--Trial of OTC Pepcid twice a day (take last dose close to bedtime) x 5-7 days  --Avoid spicy, greasy, acidic foods    Avoid eating within 3 hours of bedtime  --Continue OTC sinus medication  --Follow-up with PCP and/or ENT for ongoing/worsening symptoms over the next week

## 2023-10-24 DIAGNOSIS — E03.9 ACQUIRED HYPOTHYROIDISM: ICD-10-CM

## 2023-10-24 DIAGNOSIS — E78.49 OTHER HYPERLIPIDEMIA: ICD-10-CM

## 2023-10-24 DIAGNOSIS — I10 ESSENTIAL HYPERTENSION: ICD-10-CM

## 2023-10-24 RX ORDER — HYDROCHLOROTHIAZIDE 25 MG/1
25 TABLET ORAL EVERY MORNING
Qty: 90 TABLET | Refills: 0 | Status: SHIPPED | OUTPATIENT
Start: 2023-10-24

## 2023-10-24 RX ORDER — AMLODIPINE BESYLATE 2.5 MG/1
2.5 TABLET ORAL EVERY MORNING
Qty: 90 TABLET | Refills: 0 | Status: SHIPPED | OUTPATIENT
Start: 2023-10-24

## 2023-10-24 RX ORDER — ATORVASTATIN CALCIUM 10 MG/1
10 TABLET, FILM COATED ORAL DAILY
Qty: 90 TABLET | Refills: 0 | Status: SHIPPED | OUTPATIENT
Start: 2023-10-24

## 2023-10-24 RX ORDER — LEVOTHYROXINE SODIUM 0.07 MG/1
TABLET ORAL
Qty: 90 TABLET | Refills: 0 | Status: SHIPPED | OUTPATIENT
Start: 2023-10-24

## 2023-10-30 ENCOUNTER — RA CDI HCC (OUTPATIENT)
Dept: OTHER | Facility: HOSPITAL | Age: 78
End: 2023-10-30

## 2023-10-30 NOTE — PROGRESS NOTES
[facility] coding opportunities       Chart reviewed, no opportunity found: CHART REVIEWED, NO OPPORTUNITY FOUND        Patients Insurance     Medicare Insurance: [employer]

## 2023-11-03 ENCOUNTER — APPOINTMENT (OUTPATIENT)
Dept: LAB | Facility: CLINIC | Age: 78
End: 2023-11-03
Payer: COMMERCIAL

## 2023-11-03 DIAGNOSIS — E55.9 VITAMIN D DEFICIENCY: ICD-10-CM

## 2023-11-03 DIAGNOSIS — I10 ESSENTIAL HYPERTENSION: ICD-10-CM

## 2023-11-03 DIAGNOSIS — E03.9 ACQUIRED HYPOTHYROIDISM: ICD-10-CM

## 2023-11-03 DIAGNOSIS — E78.49 OTHER HYPERLIPIDEMIA: ICD-10-CM

## 2023-11-03 LAB
25(OH)D3 SERPL-MCNC: 71.3 NG/ML (ref 30–100)
ALBUMIN SERPL BCP-MCNC: 4.3 G/DL (ref 3.5–5)
ALP SERPL-CCNC: 66 U/L (ref 34–104)
ALT SERPL W P-5'-P-CCNC: 10 U/L (ref 7–52)
ANION GAP SERPL CALCULATED.3IONS-SCNC: 8 MMOL/L
AST SERPL W P-5'-P-CCNC: 17 U/L (ref 13–39)
BASOPHILS # BLD AUTO: 0.05 THOUSANDS/ÂΜL (ref 0–0.1)
BASOPHILS NFR BLD AUTO: 1 % (ref 0–1)
BILIRUB SERPL-MCNC: 0.87 MG/DL (ref 0.2–1)
BUN SERPL-MCNC: 12 MG/DL (ref 5–25)
CALCIUM SERPL-MCNC: 9.3 MG/DL (ref 8.4–10.2)
CHLORIDE SERPL-SCNC: 97 MMOL/L (ref 96–108)
CHOLEST SERPL-MCNC: 140 MG/DL
CO2 SERPL-SCNC: 29 MMOL/L (ref 21–32)
CREAT SERPL-MCNC: 0.5 MG/DL (ref 0.6–1.3)
EOSINOPHIL # BLD AUTO: 0.06 THOUSAND/ÂΜL (ref 0–0.61)
EOSINOPHIL NFR BLD AUTO: 1 % (ref 0–6)
ERYTHROCYTE [DISTWIDTH] IN BLOOD BY AUTOMATED COUNT: 14.3 % (ref 11.6–15.1)
GFR SERPL CREATININE-BSD FRML MDRD: 92 ML/MIN/1.73SQ M
GLUCOSE P FAST SERPL-MCNC: 88 MG/DL (ref 65–99)
HCT VFR BLD AUTO: 44.4 % (ref 34.8–46.1)
HDLC SERPL-MCNC: 61 MG/DL
HGB BLD-MCNC: 14.2 G/DL (ref 11.5–15.4)
IMM GRANULOCYTES # BLD AUTO: 0.03 THOUSAND/UL (ref 0–0.2)
IMM GRANULOCYTES NFR BLD AUTO: 0 % (ref 0–2)
LDLC SERPL CALC-MCNC: 64 MG/DL (ref 0–100)
LYMPHOCYTES # BLD AUTO: 2.74 THOUSANDS/ÂΜL (ref 0.6–4.47)
LYMPHOCYTES NFR BLD AUTO: 29 % (ref 14–44)
MCH RBC QN AUTO: 27.2 PG (ref 26.8–34.3)
MCHC RBC AUTO-ENTMCNC: 32 G/DL (ref 31.4–37.4)
MCV RBC AUTO: 85 FL (ref 82–98)
MONOCYTES # BLD AUTO: 0.53 THOUSAND/ÂΜL (ref 0.17–1.22)
MONOCYTES NFR BLD AUTO: 6 % (ref 4–12)
NEUTROPHILS # BLD AUTO: 6.14 THOUSANDS/ÂΜL (ref 1.85–7.62)
NEUTS SEG NFR BLD AUTO: 63 % (ref 43–75)
NONHDLC SERPL-MCNC: 79 MG/DL
NRBC BLD AUTO-RTO: 0 /100 WBCS
PLATELET # BLD AUTO: 273 THOUSANDS/UL (ref 149–390)
PMV BLD AUTO: 9.4 FL (ref 8.9–12.7)
POTASSIUM SERPL-SCNC: 3.8 MMOL/L (ref 3.5–5.3)
PROT SERPL-MCNC: 7.7 G/DL (ref 6.4–8.4)
RBC # BLD AUTO: 5.22 MILLION/UL (ref 3.81–5.12)
SODIUM SERPL-SCNC: 134 MMOL/L (ref 135–147)
TRIGL SERPL-MCNC: 77 MG/DL
TSH SERPL DL<=0.05 MIU/L-ACNC: 3.58 UIU/ML (ref 0.45–4.5)
WBC # BLD AUTO: 9.55 THOUSAND/UL (ref 4.31–10.16)

## 2023-11-03 PROCEDURE — 82306 VITAMIN D 25 HYDROXY: CPT

## 2023-11-03 PROCEDURE — 36415 COLL VENOUS BLD VENIPUNCTURE: CPT

## 2023-11-03 PROCEDURE — 85025 COMPLETE CBC W/AUTO DIFF WBC: CPT

## 2023-11-03 PROCEDURE — 80061 LIPID PANEL: CPT

## 2023-11-03 PROCEDURE — 80053 COMPREHEN METABOLIC PANEL: CPT

## 2023-11-03 PROCEDURE — 84443 ASSAY THYROID STIM HORMONE: CPT

## 2023-11-06 ENCOUNTER — OFFICE VISIT (OUTPATIENT)
Dept: INTERNAL MEDICINE CLINIC | Facility: CLINIC | Age: 78
End: 2023-11-06
Payer: COMMERCIAL

## 2023-11-06 VITALS
WEIGHT: 145 LBS | TEMPERATURE: 96 F | BODY MASS INDEX: 29.23 KG/M2 | OXYGEN SATURATION: 99 % | HEART RATE: 91 BPM | SYSTOLIC BLOOD PRESSURE: 136 MMHG | DIASTOLIC BLOOD PRESSURE: 84 MMHG | HEIGHT: 59 IN

## 2023-11-06 DIAGNOSIS — Z79.899 ENCOUNTER FOR LONG-TERM CURRENT USE OF MEDICATION: ICD-10-CM

## 2023-11-06 DIAGNOSIS — Z23 NEED FOR INFLUENZA VACCINATION: ICD-10-CM

## 2023-11-06 DIAGNOSIS — J84.10 PULMONARY FIBROSIS, UNSPECIFIED (HCC): ICD-10-CM

## 2023-11-06 DIAGNOSIS — E78.49 OTHER HYPERLIPIDEMIA: ICD-10-CM

## 2023-11-06 DIAGNOSIS — M85.89 OSTEOPENIA OF MULTIPLE SITES: ICD-10-CM

## 2023-11-06 DIAGNOSIS — E03.9 ACQUIRED HYPOTHYROIDISM: Primary | ICD-10-CM

## 2023-11-06 DIAGNOSIS — I10 ESSENTIAL HYPERTENSION: ICD-10-CM

## 2023-11-06 DIAGNOSIS — E66.3 OVERWEIGHT: ICD-10-CM

## 2023-11-06 DIAGNOSIS — Z71.9 HEALTH COUNSELING: ICD-10-CM

## 2023-11-06 DIAGNOSIS — C18.9 MALIGNANT NEOPLASM OF COLON, UNSPECIFIED PART OF COLON (HCC): ICD-10-CM

## 2023-11-06 DIAGNOSIS — E55.9 VITAMIN D DEFICIENCY: ICD-10-CM

## 2023-11-06 PROCEDURE — 99214 OFFICE O/P EST MOD 30 MIN: CPT | Performed by: INTERNAL MEDICINE

## 2023-11-06 PROCEDURE — 90662 IIV NO PRSV INCREASED AG IM: CPT | Performed by: INTERNAL MEDICINE

## 2023-11-06 PROCEDURE — G0008 ADMIN INFLUENZA VIRUS VAC: HCPCS | Performed by: INTERNAL MEDICINE

## 2023-11-06 NOTE — PROGRESS NOTES
Assessment/Plan:    Acquired hypothyroidism  Adequately replaced. Essential hypertension  BP normal.  Taking amlodipine 2.5 mg and HCTZ 25 mg daily. Na 134, discussed stopping HCTZ and increasing amlodipine. No medication change for now. Pulmonary fibrosis, unspecified (HCC)  Stable, no symptoms. Other hyperlipidemia  Lipids at goal, on atorvastatin 10 mg. Osteopenia of multiple sites  Continue daily walks and supplements. Malignant neoplasm of colon ([address])  Follow up with oncology as scheduled. Overweight  Lost 7 lbs since last visit. Diagnoses and all orders for this visit:    Acquired hypothyroidism  -     TSH, 3rd generation with Free T4 reflex; Future    Essential hypertension  -     CBC and differential; Future    Malignant neoplasm of colon, unspecified part of colon (HCC)  -     Uric acid; Future    Osteopenia of multiple sites    Other hyperlipidemia  -     Comprehensive metabolic panel; Future  -     Lipid panel; Future    Overweight    Pulmonary fibrosis, unspecified (HCC)    Vitamin D deficiency  -     Vitamin D 25 hydroxy; Future    Need for influenza vaccination  -     influenza vaccine, high-dose, PF 0.7 mL (FLUZONE HIGH-DOSE)    Encounter for long-term current use of medication  -     Vitamin B12; Future    Health counseling  Comments:  Flu vaccine today. Follow up in 6 months or as needed. Subjective:      Patient ID: Leonela Stubbs is a 66 y.o. female here for a follow up. She is here with her  today. She feels well, has no complaints. She and her  just returned from [place]. They eat a lot of seafood and walk frequently, usually loses weight by the time to return. She denies any chest pain, shortness of breath, palpitations or other symptoms. Denies any abdominal pain or constipation. No falls.       The following portions of the patient's history were reviewed and updated as appropriate: allergies, current medications, past medical history, past social history, and problem list.    Review of Systems   Constitutional:  Negative for appetite change and fatigue. HENT:  Negative for congestion, ear pain and postnasal drip. Eyes:  Negative for visual disturbance. Respiratory:  Negative for cough and shortness of breath. Cardiovascular:  Negative for chest pain and leg swelling. Gastrointestinal:  Negative for abdominal pain, constipation and diarrhea. Genitourinary:  Negative for dysuria, frequency and urgency. Musculoskeletal:  Negative for arthralgias and myalgias. Skin:  Negative for rash and wound. Neurological:  Negative for dizziness, numbness and headaches. Psychiatric/Behavioral:  Negative for confusion. The patient is not nervous/anxious. Objective:      /84   Pulse 91   Temp (!) 96 °F (35.6 °C)   Ht 4' 11.02" (1.499 m)   Wt 65.8 kg (145 lb)   SpO2 99%   BMI 29.27 kg/m²          Physical Exam  Vitals and nursing note reviewed. Constitutional:       General: She is not in acute distress. Appearance: She is well-developed. HENT:      Head: Normocephalic and atraumatic. Eyes:      Pupils: Pupils are equal, round, and reactive to light. Cardiovascular:      Rate and Rhythm: Normal rate and regular rhythm. Heart sounds: Normal heart sounds. Pulmonary:      Effort: Pulmonary effort is normal.      Breath sounds: Normal breath sounds. No wheezing or rales. Abdominal:      General: Bowel sounds are normal.      Palpations: Abdomen is soft. Musculoskeletal:         General: No swelling. Right lower leg: No edema. Left lower leg: No edema. Skin:     General: Skin is warm. Findings: No rash. Neurological:      General: No focal deficit present. Mental Status: She is alert and oriented to person, place, and time. Psychiatric:         Mood and Affect: Mood and affect normal. Mood is not anxious or depressed.          Behavior: Behavior normal.           Labs & imaging results reviewed with patient.

## 2023-11-06 NOTE — ASSESSMENT & PLAN NOTE
BP normal.  Taking amlodipine 2.5 mg and HCTZ 25 mg daily. Na 134, discussed stopping HCTZ and increasing amlodipine. No medication change for now.

## 2023-12-11 ENCOUNTER — VBI (OUTPATIENT)
Dept: ADMINISTRATIVE | Facility: OTHER | Age: 78
End: 2023-12-11

## 2024-01-11 ENCOUNTER — TELEPHONE (OUTPATIENT)
Age: 79
End: 2024-01-11

## 2024-01-11 ENCOUNTER — APPOINTMENT (OUTPATIENT)
Dept: LAB | Facility: CLINIC | Age: 79
End: 2024-01-11
Payer: COMMERCIAL

## 2024-01-11 DIAGNOSIS — R39.9 URINARY SYMPTOM OR SIGN: Primary | ICD-10-CM

## 2024-01-11 LAB
BACTERIA UR QL AUTO: ABNORMAL /HPF
BILIRUB UR QL STRIP: NEGATIVE
CLARITY UR: CLEAR
COLOR UR: ABNORMAL
GLUCOSE UR STRIP-MCNC: NEGATIVE MG/DL
HGB UR QL STRIP.AUTO: NEGATIVE
KETONES UR STRIP-MCNC: NEGATIVE MG/DL
LEUKOCYTE ESTERASE UR QL STRIP: ABNORMAL
NITRITE UR QL STRIP: NEGATIVE
NON-SQ EPI CELLS URNS QL MICRO: ABNORMAL /HPF
PH UR STRIP.AUTO: 7 [PH]
PROT UR STRIP-MCNC: NEGATIVE MG/DL
RBC #/AREA URNS AUTO: ABNORMAL /HPF
SP GR UR STRIP.AUTO: 1.01 (ref 1–1.03)
UROBILINOGEN UR STRIP-ACNC: <2 MG/DL
WBC #/AREA URNS AUTO: ABNORMAL /HPF

## 2024-01-11 PROCEDURE — 87086 URINE CULTURE/COLONY COUNT: CPT

## 2024-01-11 PROCEDURE — 81001 URINALYSIS AUTO W/SCOPE: CPT

## 2024-01-11 RX ORDER — CEPHALEXIN 500 MG/1
500 CAPSULE ORAL EVERY 12 HOURS SCHEDULED
Qty: 10 CAPSULE | Refills: 0 | Status: SHIPPED | OUTPATIENT
Start: 2024-01-11 | End: 2024-01-16

## 2024-01-11 NOTE — TELEPHONE ENCOUNTER
UA showed infection.  Antibiotics sent to the pharmacy, start today.   Increase daily water intake.

## 2024-01-11 NOTE — TELEPHONE ENCOUNTER
Burning when urinating.  She stated it has been a couple of weeks.  She stated she thinks it is a yeast infection. No apts at office and office stated to take a message for the doctor. Patient wanted to get antibiotics    Please call patient back . Thank you

## 2024-01-12 LAB — BACTERIA UR CULT: NORMAL

## 2024-03-25 DIAGNOSIS — I10 ESSENTIAL HYPERTENSION: ICD-10-CM

## 2024-03-25 DIAGNOSIS — E03.9 ACQUIRED HYPOTHYROIDISM: ICD-10-CM

## 2024-03-25 DIAGNOSIS — E78.49 OTHER HYPERLIPIDEMIA: ICD-10-CM

## 2024-03-26 RX ORDER — ATORVASTATIN CALCIUM 10 MG/1
10 TABLET, FILM COATED ORAL DAILY
Qty: 90 TABLET | Refills: 1 | Status: SHIPPED | OUTPATIENT
Start: 2024-03-26

## 2024-03-26 RX ORDER — AMLODIPINE BESYLATE 2.5 MG/1
2.5 TABLET ORAL EVERY MORNING
Qty: 90 TABLET | Refills: 1 | Status: SHIPPED | OUTPATIENT
Start: 2024-03-26

## 2024-03-26 RX ORDER — LEVOTHYROXINE SODIUM 0.07 MG/1
75 TABLET ORAL EVERY MORNING
Qty: 90 TABLET | Refills: 1 | Status: SHIPPED | OUTPATIENT
Start: 2024-03-26

## 2024-03-26 RX ORDER — HYDROCHLOROTHIAZIDE 25 MG/1
25 TABLET ORAL EVERY MORNING
Qty: 90 TABLET | Refills: 1 | Status: SHIPPED | OUTPATIENT
Start: 2024-03-26

## 2024-04-16 ENCOUNTER — TELEPHONE (OUTPATIENT)
Dept: GASTROENTEROLOGY | Facility: CLINIC | Age: 79
End: 2024-04-16

## 2024-04-16 NOTE — TELEPHONE ENCOUNTER
Pt is due for a follow up with Dr Molina. I called the pt to schedule, however, it immediately went to voicemail and her mailbox was not set up.

## 2024-04-28 ENCOUNTER — RA CDI HCC (OUTPATIENT)
Dept: OTHER | Facility: HOSPITAL | Age: 79
End: 2024-04-28

## 2024-05-01 ENCOUNTER — LAB (OUTPATIENT)
Dept: LAB | Facility: CLINIC | Age: 79
End: 2024-05-01
Payer: COMMERCIAL

## 2024-05-01 DIAGNOSIS — C18.9 MALIGNANT NEOPLASM OF COLON, UNSPECIFIED PART OF COLON (HCC): ICD-10-CM

## 2024-05-01 DIAGNOSIS — I10 ESSENTIAL HYPERTENSION: ICD-10-CM

## 2024-05-01 DIAGNOSIS — Z79.899 ENCOUNTER FOR LONG-TERM CURRENT USE OF MEDICATION: ICD-10-CM

## 2024-05-01 DIAGNOSIS — E55.9 VITAMIN D DEFICIENCY: ICD-10-CM

## 2024-05-01 DIAGNOSIS — E78.49 OTHER HYPERLIPIDEMIA: ICD-10-CM

## 2024-05-01 DIAGNOSIS — E03.9 ACQUIRED HYPOTHYROIDISM: ICD-10-CM

## 2024-05-01 LAB
25(OH)D3 SERPL-MCNC: 92.3 NG/ML (ref 30–100)
BASOPHILS # BLD AUTO: 0.04 THOUSANDS/ÂΜL (ref 0–0.1)
BASOPHILS NFR BLD AUTO: 1 % (ref 0–1)
CHOLEST SERPL-MCNC: 146 MG/DL
EOSINOPHIL # BLD AUTO: 0.03 THOUSAND/ÂΜL (ref 0–0.61)
EOSINOPHIL NFR BLD AUTO: 0 % (ref 0–6)
ERYTHROCYTE [DISTWIDTH] IN BLOOD BY AUTOMATED COUNT: 14.2 % (ref 11.6–15.1)
HCT VFR BLD AUTO: 45.8 % (ref 34.8–46.1)
HDLC SERPL-MCNC: 70 MG/DL
HGB BLD-MCNC: 14.5 G/DL (ref 11.5–15.4)
IMM GRANULOCYTES # BLD AUTO: 0.02 THOUSAND/UL (ref 0–0.2)
IMM GRANULOCYTES NFR BLD AUTO: 0 % (ref 0–2)
LDLC SERPL CALC-MCNC: 58 MG/DL (ref 0–100)
LYMPHOCYTES # BLD AUTO: 2.43 THOUSANDS/ÂΜL (ref 0.6–4.47)
LYMPHOCYTES NFR BLD AUTO: 30 % (ref 14–44)
MCH RBC QN AUTO: 27.3 PG (ref 26.8–34.3)
MCHC RBC AUTO-ENTMCNC: 31.7 G/DL (ref 31.4–37.4)
MCV RBC AUTO: 86 FL (ref 82–98)
MONOCYTES # BLD AUTO: 0.46 THOUSAND/ÂΜL (ref 0.17–1.22)
MONOCYTES NFR BLD AUTO: 6 % (ref 4–12)
NEUTROPHILS # BLD AUTO: 5.09 THOUSANDS/ÂΜL (ref 1.85–7.62)
NEUTS SEG NFR BLD AUTO: 63 % (ref 43–75)
NONHDLC SERPL-MCNC: 76 MG/DL
NRBC BLD AUTO-RTO: 0 /100 WBCS
PLATELET # BLD AUTO: 262 THOUSANDS/UL (ref 149–390)
PMV BLD AUTO: 9.3 FL (ref 8.9–12.7)
RBC # BLD AUTO: 5.32 MILLION/UL (ref 3.81–5.12)
T4 FREE SERPL-MCNC: 0.85 NG/DL (ref 0.61–1.12)
TRIGL SERPL-MCNC: 88 MG/DL
TSH SERPL DL<=0.05 MIU/L-ACNC: 4.72 UIU/ML (ref 0.45–4.5)
URATE SERPL-MCNC: 4.8 MG/DL (ref 2–7.5)
VIT B12 SERPL-MCNC: 1011 PG/ML (ref 180–914)
WBC # BLD AUTO: 8.07 THOUSAND/UL (ref 4.31–10.16)

## 2024-05-01 PROCEDURE — 36415 COLL VENOUS BLD VENIPUNCTURE: CPT

## 2024-05-01 PROCEDURE — 80061 LIPID PANEL: CPT

## 2024-05-01 PROCEDURE — 82607 VITAMIN B-12: CPT

## 2024-05-01 PROCEDURE — 84550 ASSAY OF BLOOD/URIC ACID: CPT

## 2024-05-01 PROCEDURE — 84439 ASSAY OF FREE THYROXINE: CPT

## 2024-05-01 PROCEDURE — 80053 COMPREHEN METABOLIC PANEL: CPT

## 2024-05-01 PROCEDURE — 82306 VITAMIN D 25 HYDROXY: CPT

## 2024-05-01 PROCEDURE — 84443 ASSAY THYROID STIM HORMONE: CPT

## 2024-05-01 PROCEDURE — 85025 COMPLETE CBC W/AUTO DIFF WBC: CPT

## 2024-05-06 ENCOUNTER — OFFICE VISIT (OUTPATIENT)
Dept: INTERNAL MEDICINE CLINIC | Facility: CLINIC | Age: 79
End: 2024-05-06
Payer: COMMERCIAL

## 2024-05-06 VITALS
TEMPERATURE: 97.7 F | WEIGHT: 142 LBS | HEART RATE: 87 BPM | DIASTOLIC BLOOD PRESSURE: 82 MMHG | BODY MASS INDEX: 28.63 KG/M2 | OXYGEN SATURATION: 99 % | HEIGHT: 59 IN | SYSTOLIC BLOOD PRESSURE: 140 MMHG

## 2024-05-06 DIAGNOSIS — E03.9 ACQUIRED HYPOTHYROIDISM: Primary | ICD-10-CM

## 2024-05-06 DIAGNOSIS — C18.9 MALIGNANT NEOPLASM OF COLON, UNSPECIFIED PART OF COLON (HCC): ICD-10-CM

## 2024-05-06 DIAGNOSIS — Z00.00 HEALTH MAINTENANCE EXAMINATION: ICD-10-CM

## 2024-05-06 DIAGNOSIS — J84.10 PULMONARY FIBROSIS, UNSPECIFIED (HCC): ICD-10-CM

## 2024-05-06 DIAGNOSIS — E55.9 VITAMIN D DEFICIENCY: ICD-10-CM

## 2024-05-06 DIAGNOSIS — E66.3 OVERWEIGHT: ICD-10-CM

## 2024-05-06 DIAGNOSIS — M85.89 OSTEOPENIA OF MULTIPLE SITES: ICD-10-CM

## 2024-05-06 DIAGNOSIS — E78.49 OTHER HYPERLIPIDEMIA: ICD-10-CM

## 2024-05-06 DIAGNOSIS — I10 ESSENTIAL HYPERTENSION: ICD-10-CM

## 2024-05-06 PROCEDURE — G0439 PPPS, SUBSEQ VISIT: HCPCS | Performed by: INTERNAL MEDICINE

## 2024-05-06 PROCEDURE — 99214 OFFICE O/P EST MOD 30 MIN: CPT | Performed by: INTERNAL MEDICINE

## 2024-05-06 NOTE — PATIENT INSTRUCTIONS
Call Dr. Molina for an appointment.    Take your vitamin D3 and B12, 3 days a week only.    Check your blood pressure at home, a few days a week.

## 2024-05-06 NOTE — PROGRESS NOTES
Assessment and Plan:     Problem List Items Addressed This Visit        Cardiovascular and Mediastinum    Essential hypertension     BP slightly elevated today. Monitor BP at home.  On amlodipine 2.5 mg and HCTZ 25 mg daily.  Recent Na normal.         Relevant Orders    Comprehensive metabolic panel       Respiratory    Pulmonary fibrosis, unspecified (HCC)     No symptoms.            Digestive    Malignant neoplasm of colon (HCC)     Has not seen oncology, GI.            Endocrine    Acquired hypothyroidism - Primary     TSH slightly elevated.  No dose change, taking appropriately.         Relevant Orders    TSH, 3rd generation with Free T4 reflex       Musculoskeletal and Integument    Osteopenia of multiple sites     Continue daily walks and supplements.  Declines further imaging.            Other    Other hyperlipidemia     At goal, on atorvastatin 10 mg.         Relevant Orders    Lipid panel    Overweight     Stable weight.         Vitamin D deficiency     May take D3, 3 days a week only.        Other Visit Diagnoses     Health maintenance examination               Preventive health issues were discussed with patient, and age appropriate screening tests were ordered as noted in patient's After Visit Summary.  Personalized health advice and appropriate referrals for health education or preventive services given if needed, as noted in patient's After Visit Summary.     History of Present Illness:     Patient presents for a Medicare Wellness Visit and follow up visit.    She was exposed to poison ivy a few days ago, while weeding. She reports a spot on her left hand. She had applied Benadryl cream on it and applied a Band Aid.  She has had poison ivy in the past, no issues.    She denies any chest pain, shortness of breath, palpitations or other symptoms.  She moves her bowels regularly.  She is not sure if she wants to have another colonoscopy.    She eats 3 meals a day, says her portions are much smaller.  She  had lost weight in the past, unintentional.  She and her  will be leaving for [place] next month.       Patient Care Team:  Jeimy Torres MD as PCP - General  Jeimy Torres MD as PCP - [facility]CP-[facility]  MD Flakito Lehman MD Sheikh Asim Ali, MD Sinan Kutty, MD as Endoscopist     Review of Systems:     Review of Systems   Constitutional:  Negative for appetite change and fatigue.   HENT:  Negative for congestion, ear pain and postnasal drip.    Eyes:  Negative for visual disturbance.   Respiratory:  Negative for cough and shortness of breath.    Cardiovascular:  Negative for chest pain and leg swelling.   Gastrointestinal:  Negative for abdominal pain, constipation and diarrhea.   Genitourinary:  Negative for dysuria, frequency and urgency.   Musculoskeletal:  Negative for arthralgias and myalgias.   Skin:  Negative for rash and wound.   Neurological:  Negative for dizziness, numbness and headaches.   Hematological:  Does not bruise/bleed easily.   Psychiatric/Behavioral:  Negative for confusion. The patient is not nervous/anxious.         Problem List:     Patient Active Problem List   Diagnosis   • Diverticulosis   • Other hyperlipidemia   • Essential hypertension   • Acquired hypothyroidism   • Internal hemorrhoids   • Malignant neoplasm of colon (HCC)   • Neuropathy   • Vitamin D deficiency   • Osteopenia of multiple sites   • Overweight   • Diverticulosis of intestine without bleeding   • Seasonal allergies   • Pulmonary fibrosis, unspecified (HCC)      Past Medical and Surgical History:     Past Medical History:   Diagnosis Date   • Anemia 06/12/2014    Transfused  3  units  of  blood   • Anxiety     resolved: Oct 23, 2017   • Cancer (HCC)    • Colon cancer (HCC)    • Disease of thyroid gland     hypothyroidism   • GERD (gastroesophageal reflux disease)    • History of Helicobacter infection     onset: 6/14   • Hypertension    • Obesity      Past  Surgical History:   Procedure Laterality Date   • BREAST SURGERY     • COLECTOMY      Partial    • COLON SURGERY      right hemicolectomy 2014   • COLON SURGERY     • COLONOSCOPY W/ POLYPECTOMY     • ESOPHAGOGASTRODUODENOSCOPY      Diagnostic - 6/14 in [place]    • HERNIA REPAIR      right inguinal hernia repair 1989   • WY COLONOSCOPY FLX DX W/COLLJ SPEC WHEN PFRMD N/A 01/11/2016    Procedure: COLONOSCOPY;  Surgeon: Flakito Molina MD;  [facility];  Service: Gastroenterology   • W[place] COLONOSCOPY FLX DX W/COLLJ SPEC WHEN PFRMD N/A 03/11/2019    Procedure: COLONOSCOPY;  Surgeon: Flakito Molina MD;  Location: [facility];  Service: Gastroenterology   • TONSILLECTOMY      as a child      Family History:     Family History   Problem Relation Age of Onset   • Hypertension Mother    • Alcohol abuse Father    • Alzheimer's disease Father    • Hyperlipidemia Sister         3 living sisters    • No Known Problems Brother    • No Known Problems Maternal Grandmother    • No Known Problems Maternal Grandfather    • No Known Problems Paternal Grandmother    • No Known Problems Paternal Grandfather       Social History:     Social History     Socioeconomic History   • Marital status: /Civil Union     Spouse name: None   • Number of children: None   • Years of education: None   • Highest education level: None   Occupational History   • Occupation: Retired    Tobacco Use   • Smoking status: Never   • Smokeless tobacco: Never   Substance and Sexual Activity   • Alcohol use: No   • Drug use: No   • Sexual activity: Not Currently     Birth control/protection: Abstinence   Other Topics Concern   • None   Social History Narrative    Denied: History of exercise habits         From [facility], visits yearly     Social Determinants of Health     Financial Resource Strain: Low Risk  (11/7/2022)    Overall Financial Resource Strain (CARDIA)    • Difficulty of Paying Living Expenses: Not hard at all   Food Insecurity: No Food  Insecurity (4/29/2024)    Hunger Vital Sign    • Worried About Running Out of Food in the Last Year: Never true    • Ran Out of Food in the Last Year: Never true   Transportation Needs: No Transportation Needs (4/29/2024)    PRAPARE - Transportation    • Lack of Transportation (Medical): No    • Lack of Transportation (Non-Medical): No   Physical Activity: Not on file   Stress: Not on file   Social Connections: Not on file   Intimate Partner Violence: Not on file   Housing Stability: Low Risk  (4/29/2024)    Housing Stability Vital Sign    • Unable to Pay for Housing in the Last Year: No    • Number of Places Lived in the Last Year: 1    • Unstable Housing in the Last Year: No      Medications and Allergies:     Current Outpatient Medications   Medication Sig Dispense Refill   • amLODIPine (NORVASC) 2.5 mg tablet Take 1 tablet (2.5 mg total) by mouth every morning 90 tablet 1   • atorvastatin (LIPITOR) 10 mg tablet Take 1 tablet (10 mg total) by mouth daily 90 tablet 1   • Calcium Carb-Cholecalciferol 1000-800 MG-UNIT TABS Take 1 tablet by mouth daily     • Garlic 1000 MG CAPS Take 1,000 mg by mouth daily.     • hydroCHLOROthiazide 25 mg tablet Take 1 tablet (25 mg total) by mouth every morning 90 tablet 1   • levothyroxine 75 mcg tablet Take 1 tablet (75 mcg total) by mouth every morning 90 tablet 0   • levothyroxine 75 mcg tablet Take 1 tablet (75 mcg total) by mouth every morning 90 tablet 1   • Loratadine 10 MG CAPS      • Multiple Vitamins-Minerals (MULTIVITAMIN WITH MINERALS) tablet Take 1 tablet by mouth daily.       No current facility-administered medications for this visit.     No Known Allergies   Immunizations:     Immunization History   Administered Date(s) Administered   • COVID-19 PFIZER VACCINE 0.3 ML IM 01/21/2021, 02/10/2021, 10/23/2021   • COVID-19 Pfizer Vac BIVALENT Germán-sucrose 12 Yr+ IM 01/04/2023   • COVID-19 Pfizer mRNA vacc PF germán-sucrose 12 yr and older (Comirnaty) 01/20/2024   • COVID-19  Pfizer vac (Germán-sucrose, gray cap) 12 yr+ IM 04/23/2022   • INFLUENZA 11/07/2022   • Influenza Split High Dose Preservative Free IM 10/02/2013, 10/03/2015, 10/15/2016, 10/16/2017   • Influenza, high dose seasonal 0.7 mL 10/22/2018, 10/22/2019, 10/05/2020, 10/04/2021, 11/07/2022, 11/06/2023   • Pneumococcal Conjugate 13-Valent 10/03/2015   • Pneumococcal Polysaccharide PPV23 10/02/2012   • Rsv, Unspecified 01/10/2024      Health Maintenance:         Topic Date Due   • Breast Cancer Screening: Mammogram  10/25/2019   • Hepatitis C Screening  Completed         Topic Date Due   • COVID-19 Vaccine (7 - 2023-24 season) 03/16/2024      Medicare Screening Tests and Risk Assessments:     Monica is here for her Subsequent Wellness visit. Last Medicare Wellness visit information reviewed, patient interviewed and updates made to the record today.      Health Risk Assessment:   Patient rates overall health as very good. Patient feels that their physical health rating is same. Patient is very satisfied with their life. Eyesight was rated as same. Hearing was rated as same. Patient feels that their emotional and mental health rating is same. Patients states they are never, rarely angry. Patient states they are never, rarely unusually tired/fatigued. Pain experienced in the last 7 days has been none. Patient states that she has experienced no weight loss or gain in last 6 months.     Depression Screening:   PHQ-2 Score: 0      Fall Risk Screening:   In the past year, patient has experienced: no history of falling in past year      Urinary Incontinence Screening:   Patient has not leaked urine accidently in the last six months.     Home Safety:  Patient does not have trouble with stairs inside or outside of their home. Patient has working smoke alarms and has no working carbon monoxide detector. Home safety hazards include: none.     Nutrition:   Current diet is Regular, Low Cholesterol, No Added Salt, Limited junk food and Other  (please comment). WE  TRY  TO  MAINTAIN  A  MEDITERRANEAN  DIET    Medications:   Patient is currently taking over-the-counter supplements. OTC medications include: see medication list. Patient is able to manage medications.     Activities of Daily Living (ADLs)/Instrumental Activities of Daily Living (IADLs):   Walk and transfer into and out of bed and chair?: Yes  Dress and groom yourself?: Yes    Bathe or shower yourself?: Yes    Feed yourself? Yes  Do your laundry/housekeeping?: Yes  Manage your money, pay your bills and track your expenses?: Yes  Make your own meals?: Yes    Do your own shopping?: Yes    Previous Hospitalizations:   Any hospitalizations or ED visits within the last 12 months?: No      Advance Care Planning:   Living will: Yes    Durable POA for healthcare: Yes    Advanced directive: Yes    End of Life Decisions reviewed with patient: Yes      Cognitive Screening:   Provider or family/friend/caregiver concerned regarding cognition?: No    PREVENTIVE SCREENINGS      Cardiovascular Screening:    General: History Lipid Disorder and Screening Current      Diabetes Screening:     General: Screening Current      Colorectal Cancer Screening:     General: History Colorectal Cancer and Patient Declines      Breast Cancer Screening:     General: Patient Declines      Cervical Cancer Screening:    General: Screening Not Indicated      Osteoporosis Screening:    General: Patient Declines      Abdominal Aortic Aneurysm (AAA) Screening:        General: Screening Not Indicated      Lung Cancer Screening:     General: Screening Not Indicated      Hepatitis C Screening:    General: Screening Current    Screening, Brief Intervention, and Referral to Treatment (SBIRT)    Screening  Typical number of drinks in a day: 0  Typical number of drinks in a week: 0  Interpretation: Low risk drinking behavior.    AUDIT-C Screenin) How often did you have a drink containing alcohol in the past year? monthly or less  2)  "How many drinks did you have on a typical day when you were drinking in the past year? 1 to 2  3) How often did you have 6 or more drinks on one occasion in the past year? never    AUDIT-C Score: 1  Interpretation: Score 0-2 (female): Negative screen for alcohol misuse    Single Item Drug Screening:  How often have you used an illegal drug (including marijuana) or a prescription medication for non-medical reasons in the past year? never    Single Item Drug Screen Score: 0  Interpretation: Negative screen for possible drug use disorder    Other Counseling Topics:   Regular weightbearing exercise and calcium and vitamin D intake.     No results found.     Physical Exam:     /82   Pulse 87   Temp 97.7 °F (36.5 °C)   Ht 4' 11\" (1.499 m)   Wt 64.4 kg (142 lb)   SpO2 99%   BMI 28.68 kg/m²     Physical Exam  Vitals and nursing note reviewed.   Constitutional:       General: She is not in acute distress.     Appearance: She is well-developed.   HENT:      Head: Normocephalic and atraumatic.      Mouth/Throat:      Mouth: Mucous membranes are moist.   Eyes:      Conjunctiva/sclera: Conjunctivae normal.   Cardiovascular:      Rate and Rhythm: Normal rate and regular rhythm.      Heart sounds: No murmur heard.  Pulmonary:      Effort: Pulmonary effort is normal. No respiratory distress.      Breath sounds: Normal breath sounds. No decreased breath sounds or rales.   Abdominal:      Palpations: Abdomen is soft.      Tenderness: There is no abdominal tenderness.   Musculoskeletal:         General: No swelling.      Cervical back: Neck supple.   Skin:     General: Skin is warm and dry.      Findings: Rash present. Rash is vesicular.          Neurological:      General: No focal deficit present.      Mental Status: She is alert.   Psychiatric:         Mood and Affect: Mood normal.         Behavior: Behavior normal.          Jeimy Torres MD    Labs & imaging results reviewed with patient.    "

## 2024-05-06 NOTE — ASSESSMENT & PLAN NOTE
BP slightly elevated today. Monitor BP at home.  On amlodipine 2.5 mg and HCTZ 25 mg daily.  Recent Na normal.

## 2024-05-08 LAB
ALBUMIN SERPL BCP-MCNC: 4.2 G/DL (ref 3.5–5)
ALP SERPL-CCNC: 49 U/L (ref 34–104)
ALT SERPL W P-5'-P-CCNC: 10 U/L (ref 7–52)
ANION GAP SERPL CALCULATED.3IONS-SCNC: 7 MMOL/L (ref 4–13)
AST SERPL W P-5'-P-CCNC: 17 U/L (ref 13–39)
BILIRUB SERPL-MCNC: 0.98 MG/DL (ref 0.2–1)
BUN SERPL-MCNC: 8 MG/DL (ref 5–25)
CALCIUM SERPL-MCNC: 9.3 MG/DL (ref 8.4–10.2)
CHLORIDE SERPL-SCNC: 97 MMOL/L (ref 96–108)
CO2 SERPL-SCNC: 31 MMOL/L (ref 21–32)
CREAT SERPL-MCNC: 0.55 MG/DL (ref 0.6–1.3)
GFR SERPL CREATININE-BSD FRML MDRD: 89 ML/MIN/1.73SQ M
GLUCOSE P FAST SERPL-MCNC: 95 MG/DL (ref 65–99)
POTASSIUM SERPL-SCNC: 4.1 MMOL/L (ref 3.5–5.3)
PROT SERPL-MCNC: 7.8 G/DL (ref 6.4–8.4)
SODIUM SERPL-SCNC: 135 MMOL/L (ref 135–147)

## 2024-06-10 DIAGNOSIS — I10 ESSENTIAL HYPERTENSION: ICD-10-CM

## 2024-06-10 DIAGNOSIS — E78.49 OTHER HYPERLIPIDEMIA: ICD-10-CM

## 2024-06-10 DIAGNOSIS — E03.9 ACQUIRED HYPOTHYROIDISM: ICD-10-CM

## 2024-06-11 RX ORDER — AMLODIPINE BESYLATE 2.5 MG/1
2.5 TABLET ORAL EVERY MORNING
Qty: 90 TABLET | Refills: 1 | Status: SHIPPED | OUTPATIENT
Start: 2024-06-11

## 2024-06-11 RX ORDER — HYDROCHLOROTHIAZIDE 25 MG/1
25 TABLET ORAL EVERY MORNING
Qty: 90 TABLET | Refills: 1 | Status: SHIPPED | OUTPATIENT
Start: 2024-06-11

## 2024-06-11 RX ORDER — LEVOTHYROXINE SODIUM 0.07 MG/1
75 TABLET ORAL EVERY MORNING
Qty: 90 TABLET | Refills: 1 | Status: SHIPPED | OUTPATIENT
Start: 2024-06-11

## 2024-06-11 RX ORDER — ATORVASTATIN CALCIUM 10 MG/1
10 TABLET, FILM COATED ORAL DAILY
Qty: 90 TABLET | Refills: 1 | Status: SHIPPED | OUTPATIENT
Start: 2024-06-11

## 2024-09-13 NOTE — ANESTHESIA POSTPROCEDURE EVALUATION
Post-Op Assessment Note    CV Status:  Stable    Pain management: adequate     Mental Status:  Alert and awake   Hydration Status:  Euvolemic   PONV Controlled:  Controlled   Airway Patency:  Patent   Post Op Vitals Reviewed: Yes      Staff: CRNA           BP  103/56   Temp      Pulse 76   Resp 16   SpO2 98
<<--- Click to launch

## 2024-09-30 DIAGNOSIS — J30.2 SEASONAL ALLERGIES: Primary | ICD-10-CM

## 2024-09-30 DIAGNOSIS — E03.9 ACQUIRED HYPOTHYROIDISM: ICD-10-CM

## 2024-09-30 DIAGNOSIS — I10 ESSENTIAL HYPERTENSION: ICD-10-CM

## 2024-09-30 DIAGNOSIS — E78.49 OTHER HYPERLIPIDEMIA: ICD-10-CM

## 2024-10-01 RX ORDER — ATORVASTATIN CALCIUM 10 MG/1
10 TABLET, FILM COATED ORAL DAILY
Qty: 90 TABLET | Refills: 0 | OUTPATIENT
Start: 2024-10-01

## 2024-10-01 RX ORDER — LEVOTHYROXINE SODIUM 75 UG/1
75 TABLET ORAL EVERY MORNING
Qty: 90 TABLET | Refills: 0 | OUTPATIENT
Start: 2024-10-01

## 2024-10-01 RX ORDER — AMLODIPINE BESYLATE 2.5 MG/1
2.5 TABLET ORAL EVERY MORNING
Qty: 90 TABLET | Refills: 0 | OUTPATIENT
Start: 2024-10-01

## 2024-10-02 RX ORDER — LORATADINE 10 MG/1
1 CAPSULE, LIQUID FILLED ORAL DAILY
Qty: 90 CAPSULE | Refills: 0 | Status: SHIPPED | OUTPATIENT
Start: 2024-10-02

## 2024-11-08 ENCOUNTER — RA CDI HCC (OUTPATIENT)
Dept: OTHER | Facility: HOSPITAL | Age: 79
End: 2024-11-08

## 2024-11-14 ENCOUNTER — APPOINTMENT (OUTPATIENT)
Dept: LAB | Facility: CLINIC | Age: 79
End: 2024-11-14
Payer: COMMERCIAL

## 2024-11-14 DIAGNOSIS — I10 ESSENTIAL HYPERTENSION: ICD-10-CM

## 2024-11-14 DIAGNOSIS — E03.9 ACQUIRED HYPOTHYROIDISM: ICD-10-CM

## 2024-11-14 DIAGNOSIS — E78.49 OTHER HYPERLIPIDEMIA: ICD-10-CM

## 2024-11-14 LAB
ALBUMIN SERPL BCG-MCNC: 4.4 G/DL (ref 3.5–5)
ALP SERPL-CCNC: 51 U/L (ref 34–104)
ALT SERPL W P-5'-P-CCNC: 10 U/L (ref 7–52)
ANION GAP SERPL CALCULATED.3IONS-SCNC: 6 MMOL/L (ref 4–13)
AST SERPL W P-5'-P-CCNC: 17 U/L (ref 13–39)
BILIRUB SERPL-MCNC: 1 MG/DL (ref 0.2–1)
BUN SERPL-MCNC: 13 MG/DL (ref 5–25)
CALCIUM SERPL-MCNC: 9.6 MG/DL (ref 8.4–10.2)
CHLORIDE SERPL-SCNC: 97 MMOL/L (ref 96–108)
CHOLEST SERPL-MCNC: 149 MG/DL (ref ?–200)
CO2 SERPL-SCNC: 32 MMOL/L (ref 21–32)
CREAT SERPL-MCNC: 0.67 MG/DL (ref 0.6–1.3)
GFR SERPL CREATININE-BSD FRML MDRD: 83 ML/MIN/1.73SQ M
GLUCOSE P FAST SERPL-MCNC: 100 MG/DL (ref 65–99)
HDLC SERPL-MCNC: 71 MG/DL
LDLC SERPL CALC-MCNC: 62 MG/DL (ref 0–100)
NONHDLC SERPL-MCNC: 78 MG/DL
POTASSIUM SERPL-SCNC: 4.1 MMOL/L (ref 3.5–5.3)
PROT SERPL-MCNC: 7.8 G/DL (ref 6.4–8.4)
SODIUM SERPL-SCNC: 135 MMOL/L (ref 135–147)
TRIGL SERPL-MCNC: 81 MG/DL (ref ?–150)
TSH SERPL DL<=0.05 MIU/L-ACNC: 3.83 UIU/ML (ref 0.45–4.5)

## 2024-11-14 PROCEDURE — 84443 ASSAY THYROID STIM HORMONE: CPT

## 2024-11-14 PROCEDURE — 80053 COMPREHEN METABOLIC PANEL: CPT

## 2024-11-14 PROCEDURE — 80061 LIPID PANEL: CPT

## 2024-11-14 PROCEDURE — 36415 COLL VENOUS BLD VENIPUNCTURE: CPT

## 2024-11-19 ENCOUNTER — OFFICE VISIT (OUTPATIENT)
Dept: INTERNAL MEDICINE CLINIC | Facility: CLINIC | Age: 79
End: 2024-11-19
Payer: COMMERCIAL

## 2024-11-19 VITALS
SYSTOLIC BLOOD PRESSURE: 132 MMHG | WEIGHT: 142 LBS | RESPIRATION RATE: 16 BRPM | TEMPERATURE: 98 F | HEIGHT: 59 IN | OXYGEN SATURATION: 96 % | DIASTOLIC BLOOD PRESSURE: 84 MMHG | BODY MASS INDEX: 28.63 KG/M2 | HEART RATE: 96 BPM

## 2024-11-19 DIAGNOSIS — M85.89 OSTEOPENIA OF MULTIPLE SITES: ICD-10-CM

## 2024-11-19 DIAGNOSIS — I10 ESSENTIAL HYPERTENSION: ICD-10-CM

## 2024-11-19 DIAGNOSIS — J84.10 PULMONARY FIBROSIS, UNSPECIFIED (HCC): ICD-10-CM

## 2024-11-19 DIAGNOSIS — E78.49 OTHER HYPERLIPIDEMIA: ICD-10-CM

## 2024-11-19 DIAGNOSIS — E55.9 VITAMIN D DEFICIENCY: ICD-10-CM

## 2024-11-19 DIAGNOSIS — C18.9 MALIGNANT NEOPLASM OF COLON, UNSPECIFIED PART OF COLON (HCC): ICD-10-CM

## 2024-11-19 DIAGNOSIS — R73.01 ABNORMAL FASTING GLUCOSE: ICD-10-CM

## 2024-11-19 DIAGNOSIS — Z23 ENCOUNTER FOR IMMUNIZATION: ICD-10-CM

## 2024-11-19 DIAGNOSIS — E03.9 ACQUIRED HYPOTHYROIDISM: Primary | ICD-10-CM

## 2024-11-19 PROCEDURE — 90662 IIV NO PRSV INCREASED AG IM: CPT | Performed by: INTERNAL MEDICINE

## 2024-11-19 PROCEDURE — 99214 OFFICE O/P EST MOD 30 MIN: CPT | Performed by: INTERNAL MEDICINE

## 2024-11-19 PROCEDURE — G0008 ADMIN INFLUENZA VIRUS VAC: HCPCS | Performed by: INTERNAL MEDICINE

## 2024-11-19 NOTE — ASSESSMENT & PLAN NOTE
BP stable. Taking amlodipine 2.5 mg and HCTZ 25 mg daily.    Orders:    CBC and differential; Future

## 2024-11-19 NOTE — PROGRESS NOTES
Name: Monica Avitia      : 1945      MRN: [id number]  Encounter Provider: Jeimy Torres MD  Encounter Date: 2024   Encounter department: [facility] INTERNAL MEDICINE  :  Assessment & Plan  Acquired hypothyroidism  Adequately replaced.    Orders:    TSH, 3rd generation with Free T4 reflex; Future    Essential hypertension  BP stable. Taking amlodipine 2.5 mg and HCTZ 25 mg daily.    Orders:    CBC and differential; Future    Osteopenia of multiple sites  Continue daily walks and supplements.         Other hyperlipidemia  At goal, on atorvastatin 10 mg.    Orders:    Comprehensive metabolic panel; Future    Lipid panel; Future    Pulmonary fibrosis, unspecified (HCC)  No symptoms.         Vitamin D deficiency  Taking D3.         Abnormal fasting glucose  Check A1c. Reviewed diet.    Orders:    Hemoglobin A1C; Future    Malignant neoplasm of colon, unspecified part of colon (HCC)  Declined further follow up, procedures.         Encounter for immunization    Orders:    influenza vaccine, high-dose, PF 0.5 mL (Fluzone High Dose)    Follow up in 5 months or as needed.       History of Present Illness     She is here today with her .  She feels well, has no complaints.  She is very active, walks and keeps busy at home.  She does not have an exercise regimen.  She reports no falls, does not feel her balance is off.  She likes to eat her bread, eats fairly healthy.  They were not able to go to [place] earlier this year due to her 's dental issues.      Review of Systems   Constitutional:  Negative for appetite change and fatigue.   HENT:  Negative for congestion, ear pain and postnasal drip.    Eyes:  Negative for visual disturbance.   Respiratory:  Negative for cough and shortness of breath.    Cardiovascular:  Negative for chest pain and leg swelling.   Gastrointestinal:  Negative for abdominal pain, constipation and diarrhea.   Genitourinary:  Negative for dysuria,  "frequency and urgency.   Musculoskeletal:  Negative for arthralgias and myalgias.   Skin:  Negative for rash and wound.   Neurological:  Negative for dizziness, numbness and headaches.   Hematological:  Does not bruise/bleed easily.   Psychiatric/Behavioral:  Negative for confusion. The patient is not nervous/anxious.           Objective   /84 (BP Location: Left arm, Patient Position: Sitting, Cuff Size: Large)   Pulse 96   Temp 98 °F (36.7 °C) (Temporal)   Resp 16   Ht 4' 11\" (1.499 m)   Wt 64.4 kg (142 lb)   SpO2 96%   BMI 28.68 kg/m²      Physical Exam  Vitals and nursing note reviewed.   Constitutional:       General: She is not in acute distress.     Appearance: She is well-developed.   HENT:      Head: Normocephalic and atraumatic.      Right Ear: External ear normal.      Left Ear: External ear normal.      Mouth/Throat:      Mouth: Mucous membranes are moist.   Eyes:      Pupils: Pupils are equal, round, and reactive to light.   Cardiovascular:      Rate and Rhythm: Normal rate and regular rhythm.      Heart sounds: Normal heart sounds.   Pulmonary:      Effort: Pulmonary effort is normal.      Breath sounds: Normal breath sounds. No wheezing.   Abdominal:      General: Bowel sounds are normal.      Palpations: Abdomen is soft.   Musculoskeletal:         General: No swelling.      Right lower leg: No edema.      Left lower leg: No edema.   Skin:     General: Skin is warm.      Findings: No rash.   Neurological:      General: No focal deficit present.      Mental Status: She is alert and oriented to person, place, and time.   Psychiatric:         Mood and Affect: Mood and affect normal. Mood is not anxious or depressed.         Behavior: Behavior normal.           Labs & imaging results reviewed with patient.    "

## 2024-11-19 NOTE — ASSESSMENT & PLAN NOTE
At goal, on atorvastatin 10 mg.    Orders:    Comprehensive metabolic panel; Future    Lipid panel; Future

## 2025-01-15 DIAGNOSIS — E03.9 ACQUIRED HYPOTHYROIDISM: ICD-10-CM

## 2025-01-15 DIAGNOSIS — E78.49 OTHER HYPERLIPIDEMIA: ICD-10-CM

## 2025-01-15 DIAGNOSIS — I10 ESSENTIAL HYPERTENSION: ICD-10-CM

## 2025-01-15 RX ORDER — LEVOTHYROXINE SODIUM 75 UG/1
75 TABLET ORAL EVERY MORNING
Qty: 90 TABLET | Refills: 1 | Status: SHIPPED | OUTPATIENT
Start: 2025-01-15

## 2025-01-15 RX ORDER — AMLODIPINE BESYLATE 2.5 MG/1
2.5 TABLET ORAL EVERY MORNING
Qty: 90 TABLET | Refills: 1 | Status: SHIPPED | OUTPATIENT
Start: 2025-01-15

## 2025-01-15 RX ORDER — ATORVASTATIN CALCIUM 10 MG/1
10 TABLET, FILM COATED ORAL DAILY
Qty: 90 TABLET | Refills: 1 | Status: SHIPPED | OUTPATIENT
Start: 2025-01-15

## 2025-01-15 RX ORDER — HYDROCHLOROTHIAZIDE 25 MG/1
25 TABLET ORAL EVERY MORNING
Qty: 90 TABLET | Refills: 1 | Status: SHIPPED | OUTPATIENT
Start: 2025-01-15

## 2025-03-26 ENCOUNTER — RA CDI HCC (OUTPATIENT)
Dept: OTHER | Facility: HOSPITAL | Age: 80
End: 2025-03-26

## 2025-04-07 DIAGNOSIS — I10 ESSENTIAL HYPERTENSION: ICD-10-CM

## 2025-04-07 DIAGNOSIS — E03.9 ACQUIRED HYPOTHYROIDISM: ICD-10-CM

## 2025-04-07 DIAGNOSIS — E78.49 OTHER HYPERLIPIDEMIA: ICD-10-CM

## 2025-04-08 RX ORDER — LEVOTHYROXINE SODIUM 75 UG/1
75 TABLET ORAL EVERY MORNING
Qty: 90 TABLET | Refills: 0 | OUTPATIENT
Start: 2025-04-08

## 2025-04-08 RX ORDER — ATORVASTATIN CALCIUM 10 MG/1
10 TABLET, FILM COATED ORAL DAILY
Qty: 90 TABLET | Refills: 1 | Status: SHIPPED | OUTPATIENT
Start: 2025-04-08

## 2025-04-08 RX ORDER — AMLODIPINE BESYLATE 2.5 MG/1
2.5 TABLET ORAL EVERY MORNING
Qty: 90 TABLET | Refills: 1 | Status: SHIPPED | OUTPATIENT
Start: 2025-04-08

## 2025-04-08 RX ORDER — HYDROCHLOROTHIAZIDE 25 MG/1
25 TABLET ORAL EVERY MORNING
Qty: 90 TABLET | Refills: 1 | Status: SHIPPED | OUTPATIENT
Start: 2025-04-08

## 2025-04-08 RX ORDER — LEVOTHYROXINE SODIUM 75 UG/1
75 TABLET ORAL EVERY MORNING
Qty: 90 TABLET | Refills: 1 | Status: SHIPPED | OUTPATIENT
Start: 2025-04-08 | End: 2025-04-16

## 2025-04-09 ENCOUNTER — APPOINTMENT (OUTPATIENT)
Dept: LAB | Facility: CLINIC | Age: 80
End: 2025-04-09
Payer: COMMERCIAL

## 2025-04-09 DIAGNOSIS — E78.49 OTHER HYPERLIPIDEMIA: ICD-10-CM

## 2025-04-09 DIAGNOSIS — I10 ESSENTIAL HYPERTENSION: ICD-10-CM

## 2025-04-09 DIAGNOSIS — R73.01 ABNORMAL FASTING GLUCOSE: ICD-10-CM

## 2025-04-09 DIAGNOSIS — E03.9 ACQUIRED HYPOTHYROIDISM: ICD-10-CM

## 2025-04-09 LAB
ALBUMIN SERPL BCG-MCNC: 4.3 G/DL (ref 3.5–5)
ALP SERPL-CCNC: 53 U/L (ref 34–104)
ALT SERPL W P-5'-P-CCNC: 10 U/L (ref 7–52)
ANION GAP SERPL CALCULATED.3IONS-SCNC: 8 MMOL/L (ref 4–13)
AST SERPL W P-5'-P-CCNC: 17 U/L (ref 13–39)
BASOPHILS # BLD AUTO: 0.03 THOUSANDS/ÂΜL (ref 0–0.1)
BASOPHILS NFR BLD AUTO: 0 % (ref 0–1)
BILIRUB SERPL-MCNC: 1.01 MG/DL (ref 0.2–1)
BUN SERPL-MCNC: 10 MG/DL (ref 5–25)
CALCIUM SERPL-MCNC: 9.4 MG/DL (ref 8.4–10.2)
CHLORIDE SERPL-SCNC: 95 MMOL/L (ref 96–108)
CHOLEST SERPL-MCNC: 147 MG/DL (ref ?–200)
CO2 SERPL-SCNC: 30 MMOL/L (ref 21–32)
CREAT SERPL-MCNC: 0.53 MG/DL (ref 0.6–1.3)
EOSINOPHIL # BLD AUTO: 0.06 THOUSAND/ÂΜL (ref 0–0.61)
EOSINOPHIL NFR BLD AUTO: 1 % (ref 0–6)
ERYTHROCYTE [DISTWIDTH] IN BLOOD BY AUTOMATED COUNT: 13.6 % (ref 11.6–15.1)
EST. AVERAGE GLUCOSE BLD GHB EST-MCNC: 117 MG/DL
GFR SERPL CREATININE-BSD FRML MDRD: 89 ML/MIN/1.73SQ M
GLUCOSE P FAST SERPL-MCNC: 93 MG/DL (ref 65–99)
HBA1C MFR BLD: 5.7 %
HCT VFR BLD AUTO: 44 % (ref 34.8–46.1)
HDLC SERPL-MCNC: 71 MG/DL
HGB BLD-MCNC: 14.2 G/DL (ref 11.5–15.4)
IMM GRANULOCYTES # BLD AUTO: 0.04 THOUSAND/UL (ref 0–0.2)
IMM GRANULOCYTES NFR BLD AUTO: 0 % (ref 0–2)
LDLC SERPL CALC-MCNC: 62 MG/DL (ref 0–100)
LYMPHOCYTES # BLD AUTO: 2.52 THOUSANDS/ÂΜL (ref 0.6–4.47)
LYMPHOCYTES NFR BLD AUTO: 26 % (ref 14–44)
MCH RBC QN AUTO: 27.3 PG (ref 26.8–34.3)
MCHC RBC AUTO-ENTMCNC: 32.3 G/DL (ref 31.4–37.4)
MCV RBC AUTO: 85 FL (ref 82–98)
MONOCYTES # BLD AUTO: 0.47 THOUSAND/ÂΜL (ref 0.17–1.22)
MONOCYTES NFR BLD AUTO: 5 % (ref 4–12)
NEUTROPHILS # BLD AUTO: 6.75 THOUSANDS/ÂΜL (ref 1.85–7.62)
NEUTS SEG NFR BLD AUTO: 68 % (ref 43–75)
NONHDLC SERPL-MCNC: 76 MG/DL
NRBC BLD AUTO-RTO: 0 /100 WBCS
PLATELET # BLD AUTO: 264 THOUSANDS/UL (ref 149–390)
PMV BLD AUTO: 8.9 FL (ref 8.9–12.7)
POTASSIUM SERPL-SCNC: 3.7 MMOL/L (ref 3.5–5.3)
PROT SERPL-MCNC: 7.7 G/DL (ref 6.4–8.4)
RBC # BLD AUTO: 5.2 MILLION/UL (ref 3.81–5.12)
SODIUM SERPL-SCNC: 133 MMOL/L (ref 135–147)
TRIGL SERPL-MCNC: 72 MG/DL (ref ?–150)
TSH SERPL DL<=0.05 MIU/L-ACNC: 2.18 UIU/ML (ref 0.45–4.5)
WBC # BLD AUTO: 9.87 THOUSAND/UL (ref 4.31–10.16)

## 2025-04-09 PROCEDURE — 83036 HEMOGLOBIN GLYCOSYLATED A1C: CPT

## 2025-04-09 PROCEDURE — 36415 COLL VENOUS BLD VENIPUNCTURE: CPT

## 2025-04-09 PROCEDURE — 80061 LIPID PANEL: CPT

## 2025-04-09 PROCEDURE — 84443 ASSAY THYROID STIM HORMONE: CPT

## 2025-04-09 PROCEDURE — 80053 COMPREHEN METABOLIC PANEL: CPT

## 2025-04-09 PROCEDURE — 85025 COMPLETE CBC W/AUTO DIFF WBC: CPT

## 2025-04-16 ENCOUNTER — OFFICE VISIT (OUTPATIENT)
Dept: INTERNAL MEDICINE CLINIC | Facility: CLINIC | Age: 80
End: 2025-04-16
Payer: COMMERCIAL

## 2025-04-16 VITALS
DIASTOLIC BLOOD PRESSURE: 90 MMHG | BODY MASS INDEX: 28.71 KG/M2 | TEMPERATURE: 96.6 F | OXYGEN SATURATION: 98 % | HEIGHT: 59 IN | WEIGHT: 142.4 LBS | HEART RATE: 88 BPM | SYSTOLIC BLOOD PRESSURE: 160 MMHG

## 2025-04-16 DIAGNOSIS — C18.9 MALIGNANT NEOPLASM OF COLON, UNSPECIFIED PART OF COLON (HCC): ICD-10-CM

## 2025-04-16 DIAGNOSIS — R73.03 PREDIABETES: ICD-10-CM

## 2025-04-16 DIAGNOSIS — I10 ESSENTIAL HYPERTENSION: Primary | ICD-10-CM

## 2025-04-16 DIAGNOSIS — E03.9 ACQUIRED HYPOTHYROIDISM: ICD-10-CM

## 2025-04-16 DIAGNOSIS — J84.10 PULMONARY FIBROSIS, UNSPECIFIED (HCC): ICD-10-CM

## 2025-04-16 DIAGNOSIS — E55.9 VITAMIN D DEFICIENCY: ICD-10-CM

## 2025-04-16 DIAGNOSIS — E78.49 OTHER HYPERLIPIDEMIA: ICD-10-CM

## 2025-04-16 PROCEDURE — 99214 OFFICE O/P EST MOD 30 MIN: CPT | Performed by: INTERNAL MEDICINE

## 2025-04-16 PROCEDURE — G2211 COMPLEX E/M VISIT ADD ON: HCPCS | Performed by: INTERNAL MEDICINE

## 2025-04-16 NOTE — PROGRESS NOTES
Name: Monica Avitia      : 1945      MRN: [id number]  Encounter Provider: Jeimy Torres MD  Encounter Date: 2025   Encounter department: [facility] INTERNAL MEDICINE  :  Assessment & Plan  Essential hypertension  Repeat BP remains elevated. Monitor at home. Taking amlodipine 2.5 mg daily, HCTZ 25 mg daily.  Sodium a bit low at 133. Will monitor.  Orders:  •  Comprehensive metabolic panel; Future    Acquired hypothyroidism  Adequately replaced.  Orders:  •  TSH, 3rd generation with Free T4 reflex; Future    Prediabetes  A1c 5.7%.  Orders:  •  Hemoglobin A1C; Future    Other hyperlipidemia  At goal, on atorvastatin 10 mg.       Pulmonary fibrosis, unspecified (HCC)  Denies any symptoms.       Malignant neoplasm of colon, unspecified part of colon (HCC)  Declined further follow up.       Vitamin D deficiency  Taking D3.       Follow up in 6 months or as needed.       History of Present Illness   She reports blood pressure at home is always normal. She reports it is usually in the 110/60's.  She takes her blood pressure medications every morning, did take it today.  She denies any headaches dizziness, chest pain, shortness of breath, palpitations or other symptoms.    She reports her fasting sugars was lower this time.  She does not eat a lot of sugar.  She does like her carbs such as pasta and bread.  She feels she and her 's appetite is not as big as it used to be.    She will be in [place] for the next few months, will return in the fall.      Review of Systems   Constitutional:  Negative for appetite change and fatigue.   HENT:  Negative for congestion, ear pain and postnasal drip.    Eyes:  Negative for visual disturbance.   Respiratory:  Negative for cough and shortness of breath.    Cardiovascular:  Negative for chest pain and leg swelling.   Gastrointestinal:  Negative for abdominal pain, constipation and diarrhea.   Genitourinary:  Negative for dysuria, frequency and  "urgency.   Musculoskeletal:  Negative for arthralgias and myalgias.   Skin:  Negative for rash and wound.   Neurological:  Negative for dizziness, numbness and headaches.   Hematological:  Does not bruise/bleed easily.   Psychiatric/Behavioral:  Negative for confusion. The patient is not nervous/anxious.        Objective   /90   Pulse 88   Temp (!) 96.6 °F (35.9 °C)   Ht 4' 11\" (1.499 m)   Wt 64.6 kg (142 lb 6.4 oz)   SpO2 98%   BMI 28.76 kg/m²      Physical Exam  Vitals and nursing note reviewed.   Constitutional:       General: She is not in acute distress.     Appearance: She is well-developed.   HENT:      Head: Normocephalic and atraumatic.      Right Ear: Tympanic membrane, ear canal and external ear normal.      Left Ear: Tympanic membrane, ear canal and external ear normal.      Mouth/Throat:      Mouth: Mucous membranes are moist.   Eyes:      Pupils: Pupils are equal, round, and reactive to light.   Cardiovascular:      Rate and Rhythm: Normal rate and regular rhythm.      Heart sounds: Murmur heard.      Systolic murmur is present.   Pulmonary:      Effort: Pulmonary effort is normal.      Breath sounds: Normal breath sounds. No wheezing.   Abdominal:      General: Bowel sounds are normal.      Palpations: Abdomen is soft.   Musculoskeletal:         General: No swelling.      Right lower leg: No edema.      Left lower leg: No edema.   Skin:     General: Skin is warm.      Findings: No rash.   Neurological:      General: No focal deficit present.      Mental Status: She is alert and oriented to person, place, and time.   Psychiatric:         Mood and Affect: Mood and affect normal. Mood is not anxious or depressed.         Behavior: Behavior normal.           Labs & imaging results reviewed with patient.    "

## 2025-04-16 NOTE — ASSESSMENT & PLAN NOTE
Repeat BP remains elevated. Monitor at home. Taking amlodipine 2.5 mg daily, HCTZ 25 mg daily.  Sodium a bit low at 133. Will monitor.  Orders:  •  Comprehensive metabolic panel; Future

## 2025-04-16 NOTE — H&P (VIEW-ONLY)
Name: Monica Avitia      : 1945      MRN: [id number]  Encounter Provider: Jeimy Torres MD  Encounter Date: 2025   Encounter department: [facility] INTERNAL MEDICINE  :  Assessment & Plan  Essential hypertension  Repeat BP remains elevated. Monitor at home. Taking amlodipine 2.5 mg daily, HCTZ 25 mg daily.  Sodium a bit low at 133. Will monitor.  Orders:  •  Comprehensive metabolic panel; Future    Acquired hypothyroidism  Adequately replaced.  Orders:  •  TSH, 3rd generation with Free T4 reflex; Future    Prediabetes  A1c 5.7%.  Orders:  •  Hemoglobin A1C; Future    Other hyperlipidemia  At goal, on atorvastatin 10 mg.       Pulmonary fibrosis, unspecified (HCC)  Denies any symptoms.       Malignant neoplasm of colon, unspecified part of colon (HCC)  Declined further follow up.       Vitamin D deficiency  Taking D3.       Follow up in 6 months or as needed.       History of Present Illness   She reports blood pressure at home is always normal. She reports it is usually in the 110/60's.  She takes her blood pressure medications every morning, did take it today.  She denies any headaches dizziness, chest pain, shortness of breath, palpitations or other symptoms.    She reports her fasting sugars was lower this time.  She does not eat a lot of sugar.  She does like her carbs such as pasta and bread.  She feels she and her 's appetite is not as big as it used to be.    She will be in [place] for the next few months, will return in the fall.      Review of Systems   Constitutional:  Negative for appetite change and fatigue.   HENT:  Negative for congestion, ear pain and postnasal drip.    Eyes:  Negative for visual disturbance.   Respiratory:  Negative for cough and shortness of breath.    Cardiovascular:  Negative for chest pain and leg swelling.   Gastrointestinal:  Negative for abdominal pain, constipation and diarrhea.   Genitourinary:  Negative for dysuria, frequency and  "urgency.   Musculoskeletal:  Negative for arthralgias and myalgias.   Skin:  Negative for rash and wound.   Neurological:  Negative for dizziness, numbness and headaches.   Hematological:  Does not bruise/bleed easily.   Psychiatric/Behavioral:  Negative for confusion. The patient is not nervous/anxious.        Objective   /90   Pulse 88   Temp (!) 96.6 °F (35.9 °C)   Ht 4' 11\" (1.499 m)   Wt 64.6 kg (142 lb 6.4 oz)   SpO2 98%   BMI 28.76 kg/m²      Physical Exam  Vitals and nursing note reviewed.   Constitutional:       General: She is not in acute distress.     Appearance: She is well-developed.   HENT:      Head: Normocephalic and atraumatic.      Right Ear: Tympanic membrane, ear canal and external ear normal.      Left Ear: Tympanic membrane, ear canal and external ear normal.      Mouth/Throat:      Mouth: Mucous membranes are moist.   Eyes:      Pupils: Pupils are equal, round, and reactive to light.   Cardiovascular:      Rate and Rhythm: Normal rate and regular rhythm.      Heart sounds: Murmur heard.      Systolic murmur is present.   Pulmonary:      Effort: Pulmonary effort is normal.      Breath sounds: Normal breath sounds. No wheezing.   Abdominal:      General: Bowel sounds are normal.      Palpations: Abdomen is soft.   Musculoskeletal:         General: No swelling.      Right lower leg: No edema.      Left lower leg: No edema.   Skin:     General: Skin is warm.      Findings: No rash.   Neurological:      General: No focal deficit present.      Mental Status: She is alert and oriented to person, place, and time.   Psychiatric:         Mood and Affect: Mood and affect normal. Mood is not anxious or depressed.         Behavior: Behavior normal.           Labs & imaging results reviewed with patient.    "

## 2025-04-23 ENCOUNTER — TELEPHONE (OUTPATIENT)
Dept: INTERNAL MEDICINE CLINIC | Facility: CLINIC | Age: 80
End: 2025-04-23

## 2025-04-24 NOTE — TELEPHONE ENCOUNTER
I spoke with Monica.  BP readings over the past week were: 121/56, 133/68, 120/67, 123/67.  
Please call patient, ask how BP readings are at home.  It was slightly high when she was at the office recently.  
no loss of vision R/no loss of vision L/no blurred vision L/no blurred vision R

## 2025-05-01 ENCOUNTER — HOSPITAL ENCOUNTER (INPATIENT)
Facility: HOSPITAL | Age: 80
LOS: 11 days | Discharge: HOME HEALTH | DRG: 329 | End: 2025-05-13
Attending: EMERGENCY MEDICINE | Admitting: SURGERY
Payer: COMMERCIAL

## 2025-05-01 ENCOUNTER — APPOINTMENT (EMERGENCY)
Dept: CT IMAGING | Facility: HOSPITAL | Age: 80
DRG: 329 | End: 2025-05-01
Payer: COMMERCIAL

## 2025-05-01 DIAGNOSIS — E87.6 HYPOKALEMIA: ICD-10-CM

## 2025-05-01 DIAGNOSIS — K40.30 INCARCERATED INGUINAL HERNIA: ICD-10-CM

## 2025-05-01 DIAGNOSIS — K65.1 INTRA-ABDOMINAL ABSCESS (HCC): ICD-10-CM

## 2025-05-01 DIAGNOSIS — E87.1 HYPONATREMIA: ICD-10-CM

## 2025-05-01 DIAGNOSIS — K56.609 SBO (SMALL BOWEL OBSTRUCTION) (HCC): Primary | ICD-10-CM

## 2025-05-01 DIAGNOSIS — K40.90 RIGHT INGUINAL HERNIA: ICD-10-CM

## 2025-05-01 LAB
ALBUMIN SERPL BCG-MCNC: 4.5 G/DL (ref 3.5–5)
ALP SERPL-CCNC: 51 U/L (ref 34–104)
ALT SERPL W P-5'-P-CCNC: 12 U/L (ref 7–52)
ANION GAP SERPL CALCULATED.3IONS-SCNC: 11 MMOL/L (ref 4–13)
APTT PPP: 25 SECONDS (ref 23–34)
AST SERPL W P-5'-P-CCNC: 23 U/L (ref 13–39)
BASOPHILS # BLD AUTO: 0.05 THOUSANDS/ÂΜL (ref 0–0.1)
BASOPHILS NFR BLD AUTO: 0 % (ref 0–1)
BILIRUB SERPL-MCNC: 0.74 MG/DL (ref 0.2–1)
BUN SERPL-MCNC: 12 MG/DL (ref 5–25)
CALCIUM SERPL-MCNC: 9.6 MG/DL (ref 8.4–10.2)
CHLORIDE SERPL-SCNC: 89 MMOL/L (ref 96–108)
CO2 SERPL-SCNC: 29 MMOL/L (ref 21–32)
CREAT SERPL-MCNC: 0.68 MG/DL (ref 0.6–1.3)
EOSINOPHIL # BLD AUTO: 0.02 THOUSAND/ÂΜL (ref 0–0.61)
EOSINOPHIL NFR BLD AUTO: 0 % (ref 0–6)
ERYTHROCYTE [DISTWIDTH] IN BLOOD BY AUTOMATED COUNT: 13.1 % (ref 11.6–15.1)
GFR SERPL CREATININE-BSD FRML MDRD: 82 ML/MIN/1.73SQ M
GLUCOSE SERPL-MCNC: 108 MG/DL (ref 65–140)
HCT VFR BLD AUTO: 42.2 % (ref 34.8–46.1)
HGB BLD-MCNC: 14 G/DL (ref 11.5–15.4)
IMM GRANULOCYTES # BLD AUTO: 0.07 THOUSAND/UL (ref 0–0.2)
IMM GRANULOCYTES NFR BLD AUTO: 0 % (ref 0–2)
INR PPP: 0.97 (ref 0.85–1.19)
LACTATE SERPL-SCNC: 1.1 MMOL/L (ref 0.5–2)
LIPASE SERPL-CCNC: 17 U/L (ref 11–82)
LYMPHOCYTES # BLD AUTO: 1.78 THOUSANDS/ÂΜL (ref 0.6–4.47)
LYMPHOCYTES NFR BLD AUTO: 11 % (ref 14–44)
MCH RBC QN AUTO: 27.4 PG (ref 26.8–34.3)
MCHC RBC AUTO-ENTMCNC: 33.2 G/DL (ref 31.4–37.4)
MCV RBC AUTO: 83 FL (ref 82–98)
MONOCYTES # BLD AUTO: 0.79 THOUSAND/ÂΜL (ref 0.17–1.22)
MONOCYTES NFR BLD AUTO: 5 % (ref 4–12)
NEUTROPHILS # BLD AUTO: 12.93 THOUSANDS/ÂΜL (ref 1.85–7.62)
NEUTS SEG NFR BLD AUTO: 84 % (ref 43–75)
NRBC BLD AUTO-RTO: 0 /100 WBCS
PLATELET # BLD AUTO: 299 THOUSANDS/UL (ref 149–390)
PMV BLD AUTO: 8.9 FL (ref 8.9–12.7)
POTASSIUM SERPL-SCNC: 3.2 MMOL/L (ref 3.5–5.3)
PROT SERPL-MCNC: 7.9 G/DL (ref 6.4–8.4)
PROTHROMBIN TIME: 13.6 SECONDS (ref 12.3–15)
RBC # BLD AUTO: 5.11 MILLION/UL (ref 3.81–5.12)
SODIUM SERPL-SCNC: 129 MMOL/L (ref 135–147)
WBC # BLD AUTO: 15.64 THOUSAND/UL (ref 4.31–10.16)

## 2025-05-01 PROCEDURE — 74177 CT ABD & PELVIS W/CONTRAST: CPT

## 2025-05-01 PROCEDURE — 84145 PROCALCITONIN (PCT): CPT

## 2025-05-01 PROCEDURE — 80053 COMPREHEN METABOLIC PANEL: CPT

## 2025-05-01 PROCEDURE — 85610 PROTHROMBIN TIME: CPT

## 2025-05-01 PROCEDURE — 99284 EMERGENCY DEPT VISIT MOD MDM: CPT

## 2025-05-01 PROCEDURE — 83605 ASSAY OF LACTIC ACID: CPT

## 2025-05-01 PROCEDURE — 81001 URINALYSIS AUTO W/SCOPE: CPT

## 2025-05-01 PROCEDURE — 85025 COMPLETE CBC W/AUTO DIFF WBC: CPT

## 2025-05-01 PROCEDURE — 36415 COLL VENOUS BLD VENIPUNCTURE: CPT

## 2025-05-01 PROCEDURE — 96375 TX/PRO/DX INJ NEW DRUG ADDON: CPT

## 2025-05-01 PROCEDURE — 87040 BLOOD CULTURE FOR BACTERIA: CPT

## 2025-05-01 PROCEDURE — 85730 THROMBOPLASTIN TIME PARTIAL: CPT

## 2025-05-01 PROCEDURE — 99285 EMERGENCY DEPT VISIT HI MDM: CPT

## 2025-05-01 PROCEDURE — 96365 THER/PROPH/DIAG IV INF INIT: CPT

## 2025-05-01 PROCEDURE — 83690 ASSAY OF LIPASE: CPT

## 2025-05-01 RX ORDER — ACETAMINOPHEN 10 MG/ML
1000 INJECTION, SOLUTION INTRAVENOUS ONCE
Status: COMPLETED | OUTPATIENT
Start: 2025-05-01 | End: 2025-05-01

## 2025-05-01 RX ORDER — ONDANSETRON 2 MG/ML
4 INJECTION INTRAMUSCULAR; INTRAVENOUS ONCE
Status: COMPLETED | OUTPATIENT
Start: 2025-05-01 | End: 2025-05-01

## 2025-05-01 RX ADMIN — ONDANSETRON 4 MG: 2 INJECTION, SOLUTION INTRAMUSCULAR; INTRAVENOUS at 21:50

## 2025-05-01 RX ADMIN — ACETAMINOPHEN 1000 MG: 1000 INJECTION, SOLUTION INTRAVENOUS at 21:53

## 2025-05-01 RX ADMIN — IOHEXOL 85 ML: 350 INJECTION, SOLUTION INTRAVENOUS at 22:38

## 2025-05-02 ENCOUNTER — APPOINTMENT (INPATIENT)
Dept: RADIOLOGY | Facility: HOSPITAL | Age: 80
DRG: 329 | End: 2025-05-02
Payer: COMMERCIAL

## 2025-05-02 ENCOUNTER — ANESTHESIA EVENT (INPATIENT)
Dept: PERIOP | Facility: HOSPITAL | Age: 80
End: 2025-05-02
Payer: COMMERCIAL

## 2025-05-02 ENCOUNTER — ANESTHESIA (INPATIENT)
Dept: PERIOP | Facility: HOSPITAL | Age: 80
End: 2025-05-02
Payer: COMMERCIAL

## 2025-05-02 PROBLEM — R55 SYNCOPE: Status: ACTIVE | Noted: 2025-05-02

## 2025-05-02 PROBLEM — I95.9 HYPOTENSION: Status: ACTIVE | Noted: 2025-05-02

## 2025-05-02 PROBLEM — K41.40: Status: ACTIVE | Noted: 2025-05-02

## 2025-05-02 PROBLEM — K56.609 SBO (SMALL BOWEL OBSTRUCTION) (HCC): Status: ACTIVE | Noted: 2025-05-02

## 2025-05-02 LAB
2HR DELTA HS TROPONIN: -1 NG/L
4HR DELTA HS TROPONIN: 0 NG/L
ABO GROUP BLD: NORMAL
ABO GROUP BLD: NORMAL
ALBUMIN SERPL BCG-MCNC: 3.1 G/DL (ref 3.5–5)
ALP SERPL-CCNC: 33 U/L (ref 34–104)
ALT SERPL W P-5'-P-CCNC: 10 U/L (ref 7–52)
ANION GAP SERPL CALCULATED.3IONS-SCNC: 10 MMOL/L (ref 4–13)
ANION GAP SERPL CALCULATED.3IONS-SCNC: 9 MMOL/L (ref 4–13)
ANISOCYTOSIS BLD QL SMEAR: PRESENT
ARTERIAL PATENCY WRIST A: NO
AST SERPL W P-5'-P-CCNC: 15 U/L (ref 13–39)
BACTERIA UR QL AUTO: NORMAL /HPF
BASE EX.OXY STD BLDV CALC-SCNC: 32.4 % (ref 60–80)
BASE EXCESS BLDV CALC-SCNC: -2.6 MMOL/L
BASOPHILS # BLD MANUAL: 0 THOUSAND/UL (ref 0–0.1)
BASOPHILS # BLD MANUAL: 0 THOUSAND/UL (ref 0–0.1)
BASOPHILS NFR MAR MANUAL: 0 % (ref 0–1)
BASOPHILS NFR MAR MANUAL: 0 % (ref 0–1)
BILIRUB SERPL-MCNC: 0.63 MG/DL (ref 0.2–1)
BILIRUB UR QL STRIP: NEGATIVE
BLD GP AB SCN SERPL QL: NEGATIVE
BUN SERPL-MCNC: 10 MG/DL (ref 5–25)
BUN SERPL-MCNC: 26 MG/DL (ref 5–25)
CA-I BLD-SCNC: 1.05 MMOL/L (ref 1.12–1.32)
CALCIUM ALBUM COR SERPL-MCNC: 8.5 MG/DL (ref 8.3–10.1)
CALCIUM SERPL-MCNC: 7.8 MG/DL (ref 8.4–10.2)
CALCIUM SERPL-MCNC: 8.6 MG/DL (ref 8.4–10.2)
CARDIAC TROPONIN I PNL SERPL HS: 10 NG/L (ref ?–50)
CARDIAC TROPONIN I PNL SERPL HS: 10 NG/L (ref ?–50)
CARDIAC TROPONIN I PNL SERPL HS: 9 NG/L (ref ?–50)
CFFFLEV: 441.6 MG/DL (ref 278–581)
CFFMA (FUNCTIONAL FIBRINOGEN MAX AMPLITUDE): 24.2 MM (ref 15–32)
CHLORIDE SERPL-SCNC: 92 MMOL/L (ref 96–108)
CHLORIDE SERPL-SCNC: 98 MMOL/L (ref 96–108)
CKA(ANGLE): 70.1 DEG (ref 63–78)
CKHR(HEPARINASE REACTION TIME): 5.6 MIN (ref 4.3–8.3)
CKK(CLOT KINETICS): 1.4 MIN (ref 0.8–2.1)
CKMA(MAX AMPLITUDE): 63.8 MM (ref 52–69)
CKR(REACTION TIME): 6.1 MIN (ref 4.6–9.1)
CLARITY UR: CLEAR
CO2 SERPL-SCNC: 25 MMOL/L (ref 21–32)
CO2 SERPL-SCNC: 28 MMOL/L (ref 21–32)
COLOR UR: COLORLESS
CREAT SERPL-MCNC: 0.59 MG/DL (ref 0.6–1.3)
CREAT SERPL-MCNC: 0.89 MG/DL (ref 0.6–1.3)
CRTMA(RAPIDTEG MAX AMPLITUDE): 63.4 MM (ref 52–70)
EOSINOPHIL # BLD MANUAL: 0 THOUSAND/UL (ref 0–0.4)
EOSINOPHIL # BLD MANUAL: 0 THOUSAND/UL (ref 0–0.4)
EOSINOPHIL NFR BLD MANUAL: 0 % (ref 0–6)
EOSINOPHIL NFR BLD MANUAL: 0 % (ref 0–6)
ERYTHROCYTE [DISTWIDTH] IN BLOOD BY AUTOMATED COUNT: 13.1 % (ref 11.6–15.1)
ERYTHROCYTE [DISTWIDTH] IN BLOOD BY AUTOMATED COUNT: 13.3 % (ref 11.6–15.1)
FLUAV AG UPPER RESP QL IA.RAPID: NEGATIVE
FLUBV AG UPPER RESP QL IA.RAPID: NEGATIVE
GFR SERPL CREATININE-BSD FRML MDRD: 61 ML/MIN/1.73SQ M
GFR SERPL CREATININE-BSD FRML MDRD: 86 ML/MIN/1.73SQ M
GIANT PLATELETS BLD QL SMEAR: PRESENT
GLUCOSE SERPL-MCNC: 147 MG/DL (ref 65–140)
GLUCOSE SERPL-MCNC: 152 MG/DL (ref 65–140)
GLUCOSE SERPL-MCNC: 154 MG/DL (ref 65–140)
GLUCOSE SERPL-MCNC: 157 MG/DL (ref 65–140)
GLUCOSE SERPL-MCNC: 167 MG/DL (ref 65–140)
GLUCOSE UR STRIP-MCNC: NEGATIVE MG/DL
HBV SURFACE AG SER QL: NORMAL
HCO3 BLDV-SCNC: 23 MMOL/L (ref 24–30)
HCT VFR BLD AUTO: 27.5 % (ref 34.8–46.1)
HCT VFR BLD AUTO: 29.7 % (ref 34.8–46.1)
HCT VFR BLD AUTO: 42.3 % (ref 34.8–46.1)
HCV AB SER QL: NORMAL
HGB BLD-MCNC: 13.8 G/DL (ref 11.5–15.4)
HGB BLD-MCNC: 9 G/DL (ref 11.5–15.4)
HGB BLD-MCNC: 9.6 G/DL (ref 11.5–15.4)
HGB UR QL STRIP.AUTO: ABNORMAL
HIV 1+2 AB+HIV1 P24 AG SERPL QL IA: NORMAL
HIV1 P24 AG SER QL: NORMAL
KETONES UR STRIP-MCNC: ABNORMAL MG/DL
LACTATE SERPL-SCNC: 2 MMOL/L (ref 0.5–2)
LEUKOCYTE ESTERASE UR QL STRIP: NEGATIVE
LYMPHOCYTES # BLD AUTO: 0.81 THOUSAND/UL (ref 0.6–4.47)
LYMPHOCYTES # BLD AUTO: 1.01 THOUSAND/UL (ref 0.6–4.47)
LYMPHOCYTES # BLD AUTO: 4 % (ref 14–44)
LYMPHOCYTES # BLD AUTO: 6 % (ref 14–44)
MAGNESIUM SERPL-MCNC: 1.6 MG/DL (ref 1.9–2.7)
MAGNESIUM SERPL-MCNC: 2.9 MG/DL (ref 1.9–2.7)
MCH RBC QN AUTO: 27.3 PG (ref 26.8–34.3)
MCH RBC QN AUTO: 27.6 PG (ref 26.8–34.3)
MCHC RBC AUTO-ENTMCNC: 32.6 G/DL (ref 31.4–37.4)
MCHC RBC AUTO-ENTMCNC: 32.7 G/DL (ref 31.4–37.4)
MCV RBC AUTO: 84 FL (ref 82–98)
MCV RBC AUTO: 84 FL (ref 82–98)
MONOCYTES # BLD AUTO: 0.5 THOUSAND/UL (ref 0–1.22)
MONOCYTES # BLD AUTO: 0.81 THOUSAND/UL (ref 0–1.22)
MONOCYTES NFR BLD: 3 % (ref 4–12)
MONOCYTES NFR BLD: 4 % (ref 4–12)
NEUTROPHILS # BLD MANUAL: 15.28 THOUSAND/UL (ref 1.85–7.62)
NEUTROPHILS # BLD MANUAL: 18.69 THOUSAND/UL (ref 1.85–7.62)
NEUTS BAND NFR BLD MANUAL: 1 % (ref 0–8)
NEUTS SEG NFR BLD AUTO: 91 % (ref 43–75)
NEUTS SEG NFR BLD AUTO: 91 % (ref 43–75)
NITRITE UR QL STRIP: NEGATIVE
NON-SQ EPI CELLS URNS QL MICRO: NORMAL /HPF
O2 CT BLDV-SCNC: 4.1 ML/DL
PCO2 BLDV: 43.1 MM HG (ref 42–50)
PH BLDV: 7.34 [PH] (ref 7.3–7.4)
PH UR STRIP.AUTO: 7.5 [PH]
PHOSPHATE SERPL-MCNC: 2.9 MG/DL (ref 2.3–4.1)
PHOSPHATE SERPL-MCNC: 4.3 MG/DL (ref 2.3–4.1)
PLATELET # BLD AUTO: 263 THOUSANDS/UL (ref 149–390)
PLATELET # BLD AUTO: 308 THOUSANDS/UL (ref 149–390)
PLATELET BLD QL SMEAR: ADEQUATE
PLATELET BLD QL SMEAR: ADEQUATE
PMV BLD AUTO: 8.8 FL (ref 8.9–12.7)
PMV BLD AUTO: 9.2 FL (ref 8.9–12.7)
PO2 BLDV: 23.3 MM HG (ref 35–45)
POTASSIUM SERPL-SCNC: 3.4 MMOL/L (ref 3.5–5.3)
POTASSIUM SERPL-SCNC: 3.8 MMOL/L (ref 3.5–5.3)
PROCALCITONIN SERPL-MCNC: <0.05 NG/ML
PROT SERPL-MCNC: 5.5 G/DL (ref 6.4–8.4)
PROT UR STRIP-MCNC: ABNORMAL MG/DL
RBC # BLD AUTO: 3.26 MILLION/UL (ref 3.81–5.12)
RBC # BLD AUTO: 5.06 MILLION/UL (ref 3.81–5.12)
RBC #/AREA URNS AUTO: NORMAL /HPF
RBC MORPH BLD: NORMAL
RBC MORPH BLD: PRESENT
RH BLD: NEGATIVE
RH BLD: NEGATIVE
SARS-COV+SARS-COV-2 AG RESP QL IA.RAPID: NEGATIVE
SODIUM SERPL-SCNC: 130 MMOL/L (ref 135–147)
SODIUM SERPL-SCNC: 132 MMOL/L (ref 135–147)
SP GR UR STRIP.AUTO: >=1.05 (ref 1–1.03)
SPECIMEN EXPIRATION DATE: NORMAL
UROBILINOGEN UR STRIP-ACNC: <2 MG/DL
WBC # BLD AUTO: 16.79 THOUSAND/UL (ref 4.31–10.16)
WBC # BLD AUTO: 20.32 THOUSAND/UL (ref 4.31–10.16)
WBC #/AREA URNS AUTO: NORMAL /HPF

## 2025-05-02 PROCEDURE — 82330 ASSAY OF CALCIUM: CPT | Performed by: PHYSICIAN ASSISTANT

## 2025-05-02 PROCEDURE — 82805 BLOOD GASES W/O2 SATURATION: CPT | Performed by: PHYSICIAN ASSISTANT

## 2025-05-02 PROCEDURE — 87806 HIV AG W/HIV1&2 ANTB W/OPTIC: CPT | Performed by: NURSE ANESTHETIST, CERTIFIED REGISTERED

## 2025-05-02 PROCEDURE — 82948 REAGENT STRIP/BLOOD GLUCOSE: CPT

## 2025-05-02 PROCEDURE — 86850 RBC ANTIBODY SCREEN: CPT | Performed by: PHYSICIAN ASSISTANT

## 2025-05-02 PROCEDURE — 85347 COAGULATION TIME ACTIVATED: CPT

## 2025-05-02 PROCEDURE — 71045 X-RAY EXAM CHEST 1 VIEW: CPT

## 2025-05-02 PROCEDURE — 85014 HEMATOCRIT: CPT

## 2025-05-02 PROCEDURE — 80053 COMPREHEN METABOLIC PANEL: CPT | Performed by: PHYSICIAN ASSISTANT

## 2025-05-02 PROCEDURE — 87804 INFLUENZA ASSAY W/OPTIC: CPT

## 2025-05-02 PROCEDURE — 99291 CRITICAL CARE FIRST HOUR: CPT | Performed by: SURGERY

## 2025-05-02 PROCEDURE — 88307 TISSUE EXAM BY PATHOLOGIST: CPT | Performed by: PATHOLOGY

## 2025-05-02 PROCEDURE — 96375 TX/PRO/DX INJ NEW DRUG ADDON: CPT

## 2025-05-02 PROCEDURE — 87811 SARS-COV-2 COVID19 W/OPTIC: CPT

## 2025-05-02 PROCEDURE — 93005 ELECTROCARDIOGRAM TRACING: CPT

## 2025-05-02 PROCEDURE — 85384 FIBRINOGEN ACTIVITY: CPT

## 2025-05-02 PROCEDURE — 86901 BLOOD TYPING SEROLOGIC RH(D): CPT | Performed by: PHYSICIAN ASSISTANT

## 2025-05-02 PROCEDURE — 84100 ASSAY OF PHOSPHORUS: CPT

## 2025-05-02 PROCEDURE — 80048 BASIC METABOLIC PNL TOTAL CA: CPT

## 2025-05-02 PROCEDURE — 85027 COMPLETE CBC AUTOMATED: CPT

## 2025-05-02 PROCEDURE — 83735 ASSAY OF MAGNESIUM: CPT

## 2025-05-02 PROCEDURE — 84484 ASSAY OF TROPONIN QUANT: CPT | Performed by: PHYSICIAN ASSISTANT

## 2025-05-02 PROCEDURE — P9016 RBC LEUKOCYTES REDUCED: HCPCS

## 2025-05-02 PROCEDURE — 85576 BLOOD PLATELET AGGREGATION: CPT

## 2025-05-02 PROCEDURE — 30233N1 TRANSFUSION OF NONAUTOLOGOUS RED BLOOD CELLS INTO PERIPHERAL VEIN, PERCUTANEOUS APPROACH: ICD-10-PCS | Performed by: PHYSICIAN ASSISTANT

## 2025-05-02 PROCEDURE — 85397 CLOTTING FUNCT ACTIVITY: CPT

## 2025-05-02 PROCEDURE — 44120 REMOVAL OF SMALL INTESTINE: CPT | Performed by: SURGERY

## 2025-05-02 PROCEDURE — 86923 COMPATIBILITY TEST ELECTRIC: CPT

## 2025-05-02 PROCEDURE — 85018 HEMOGLOBIN: CPT

## 2025-05-02 PROCEDURE — 85027 COMPLETE CBC AUTOMATED: CPT | Performed by: PHYSICIAN ASSISTANT

## 2025-05-02 PROCEDURE — 49553 RPR FEM HERNIA INIT BLOCKED: CPT | Performed by: SURGERY

## 2025-05-02 PROCEDURE — 86900 BLOOD TYPING SEROLOGIC ABO: CPT | Performed by: PHYSICIAN ASSISTANT

## 2025-05-02 PROCEDURE — 83735 ASSAY OF MAGNESIUM: CPT | Performed by: PHYSICIAN ASSISTANT

## 2025-05-02 PROCEDURE — 74018 RADEX ABDOMEN 1 VIEW: CPT

## 2025-05-02 PROCEDURE — 86803 HEPATITIS C AB TEST: CPT | Performed by: NURSE ANESTHETIST, CERTIFIED REGISTERED

## 2025-05-02 PROCEDURE — 0YQ70ZZ REPAIR RIGHT FEMORAL REGION, OPEN APPROACH: ICD-10-PCS | Performed by: SURGERY

## 2025-05-02 PROCEDURE — 87340 HEPATITIS B SURFACE AG IA: CPT | Performed by: NURSE ANESTHETIST, CERTIFIED REGISTERED

## 2025-05-02 PROCEDURE — 84100 ASSAY OF PHOSPHORUS: CPT | Performed by: PHYSICIAN ASSISTANT

## 2025-05-02 PROCEDURE — 97167 OT EVAL HIGH COMPLEX 60 MIN: CPT

## 2025-05-02 PROCEDURE — 0DB80ZZ EXCISION OF SMALL INTESTINE, OPEN APPROACH: ICD-10-PCS | Performed by: SURGERY

## 2025-05-02 PROCEDURE — 99223 1ST HOSP IP/OBS HIGH 75: CPT | Performed by: SURGERY

## 2025-05-02 PROCEDURE — 85007 BL SMEAR W/DIFF WBC COUNT: CPT | Performed by: PHYSICIAN ASSISTANT

## 2025-05-02 PROCEDURE — 83605 ASSAY OF LACTIC ACID: CPT | Performed by: PHYSICIAN ASSISTANT

## 2025-05-02 PROCEDURE — 97163 PT EVAL HIGH COMPLEX 45 MIN: CPT

## 2025-05-02 PROCEDURE — NC001 PR NO CHARGE: Performed by: SURGERY

## 2025-05-02 PROCEDURE — 85007 BL SMEAR W/DIFF WBC COUNT: CPT

## 2025-05-02 RX ORDER — ACETAMINOPHEN 10 MG/ML
1000 INJECTION, SOLUTION INTRAVENOUS EVERY 6 HOURS SCHEDULED
Status: DISPENSED | OUTPATIENT
Start: 2025-05-02 | End: 2025-05-04

## 2025-05-02 RX ORDER — PANTOPRAZOLE SODIUM 40 MG/10ML
40 INJECTION, POWDER, LYOPHILIZED, FOR SOLUTION INTRAVENOUS EVERY 12 HOURS SCHEDULED
Status: DISCONTINUED | OUTPATIENT
Start: 2025-05-02 | End: 2025-05-08

## 2025-05-02 RX ORDER — HYDROMORPHONE HCL/PF 1 MG/ML
0.5 SYRINGE (ML) INJECTION
Status: DISCONTINUED | OUTPATIENT
Start: 2025-05-02 | End: 2025-05-02 | Stop reason: HOSPADM

## 2025-05-02 RX ORDER — ALBUTEROL SULFATE 0.83 MG/ML
2.5 SOLUTION RESPIRATORY (INHALATION) ONCE AS NEEDED
Status: DISCONTINUED | OUTPATIENT
Start: 2025-05-02 | End: 2025-05-02 | Stop reason: HOSPADM

## 2025-05-02 RX ORDER — ENOXAPARIN SODIUM 100 MG/ML
40 INJECTION SUBCUTANEOUS
Status: DISCONTINUED | OUTPATIENT
Start: 2025-05-02 | End: 2025-05-05

## 2025-05-02 RX ORDER — ONDANSETRON 2 MG/ML
4 INJECTION INTRAMUSCULAR; INTRAVENOUS EVERY 6 HOURS PRN
Status: DISCONTINUED | OUTPATIENT
Start: 2025-05-02 | End: 2025-05-13 | Stop reason: HOSPADM

## 2025-05-02 RX ORDER — CEFAZOLIN SODIUM 2 G/50ML
2000 SOLUTION INTRAVENOUS
Status: DISCONTINUED | OUTPATIENT
Start: 2025-05-02 | End: 2025-05-02

## 2025-05-02 RX ORDER — METRONIDAZOLE 500 MG/100ML
500 INJECTION, SOLUTION INTRAVENOUS EVERY 12 HOURS
Status: COMPLETED | OUTPATIENT
Start: 2025-05-02 | End: 2025-05-05

## 2025-05-02 RX ORDER — MAGNESIUM HYDROXIDE 1200 MG/15ML
LIQUID ORAL AS NEEDED
Status: DISCONTINUED | OUTPATIENT
Start: 2025-05-02 | End: 2025-05-02 | Stop reason: HOSPADM

## 2025-05-02 RX ORDER — BUPIVACAINE HYDROCHLORIDE AND EPINEPHRINE 2.5; 5 MG/ML; UG/ML
INJECTION, SOLUTION INFILTRATION; PERINEURAL AS NEEDED
Status: DISCONTINUED | OUTPATIENT
Start: 2025-05-02 | End: 2025-05-02 | Stop reason: HOSPADM

## 2025-05-02 RX ORDER — CHLORHEXIDINE GLUCONATE ORAL RINSE 1.2 MG/ML
15 SOLUTION DENTAL EVERY 12 HOURS SCHEDULED
Status: DISCONTINUED | OUTPATIENT
Start: 2025-05-02 | End: 2025-05-13 | Stop reason: HOSPADM

## 2025-05-02 RX ORDER — HYDRALAZINE HYDROCHLORIDE 20 MG/ML
5 INJECTION INTRAMUSCULAR; INTRAVENOUS ONCE AS NEEDED
Status: DISCONTINUED | OUTPATIENT
Start: 2025-05-02 | End: 2025-05-02 | Stop reason: HOSPADM

## 2025-05-02 RX ORDER — ROCURONIUM BROMIDE 10 MG/ML
INJECTION, SOLUTION INTRAVENOUS AS NEEDED
Status: DISCONTINUED | OUTPATIENT
Start: 2025-05-02 | End: 2025-05-02

## 2025-05-02 RX ORDER — SODIUM CHLORIDE 9 MG/ML
100 INJECTION, SOLUTION INTRAVENOUS CONTINUOUS
Status: DISCONTINUED | OUTPATIENT
Start: 2025-05-02 | End: 2025-05-05

## 2025-05-02 RX ORDER — SODIUM CHLORIDE, SODIUM LACTATE, POTASSIUM CHLORIDE, CALCIUM CHLORIDE 600; 310; 30; 20 MG/100ML; MG/100ML; MG/100ML; MG/100ML
INJECTION, SOLUTION INTRAVENOUS CONTINUOUS PRN
Status: DISCONTINUED | OUTPATIENT
Start: 2025-05-02 | End: 2025-05-02

## 2025-05-02 RX ORDER — HYDROMORPHONE HCL IN WATER/PF 6 MG/30 ML
0.2 PATIENT CONTROLLED ANALGESIA SYRINGE INTRAVENOUS EVERY 4 HOURS PRN
Refills: 0 | Status: DISCONTINUED | OUTPATIENT
Start: 2025-05-02 | End: 2025-05-03

## 2025-05-02 RX ORDER — HYDROMORPHONE HCL/PF 1 MG/ML
0.5 SYRINGE (ML) INJECTION EVERY 4 HOURS PRN
Refills: 0 | Status: DISCONTINUED | OUTPATIENT
Start: 2025-05-02 | End: 2025-05-03

## 2025-05-02 RX ORDER — CALCIUM GLUCONATE 20 MG/ML
2 INJECTION, SOLUTION INTRAVENOUS ONCE
Status: COMPLETED | OUTPATIENT
Start: 2025-05-02 | End: 2025-05-02

## 2025-05-02 RX ORDER — SUCCINYLCHOLINE/SOD CL,ISO/PF 100 MG/5ML
SYRINGE (ML) INTRAVENOUS AS NEEDED
Status: DISCONTINUED | OUTPATIENT
Start: 2025-05-02 | End: 2025-05-02

## 2025-05-02 RX ORDER — POTASSIUM CHLORIDE 14.9 MG/ML
20 INJECTION INTRAVENOUS 3 TIMES DAILY
Status: COMPLETED | OUTPATIENT
Start: 2025-05-02 | End: 2025-05-02

## 2025-05-02 RX ORDER — LIDOCAINE HYDROCHLORIDE 10 MG/ML
INJECTION, SOLUTION EPIDURAL; INFILTRATION; INTRACAUDAL; PERINEURAL AS NEEDED
Status: DISCONTINUED | OUTPATIENT
Start: 2025-05-02 | End: 2025-05-02

## 2025-05-02 RX ORDER — ONDANSETRON 2 MG/ML
4 INJECTION INTRAMUSCULAR; INTRAVENOUS ONCE
Status: DISCONTINUED | OUTPATIENT
Start: 2025-05-02 | End: 2025-05-02

## 2025-05-02 RX ORDER — FENTANYL CITRATE/PF 50 MCG/ML
25 SYRINGE (ML) INJECTION
Status: DISCONTINUED | OUTPATIENT
Start: 2025-05-02 | End: 2025-05-02 | Stop reason: HOSPADM

## 2025-05-02 RX ORDER — DIPHENHYDRAMINE HYDROCHLORIDE 50 MG/ML
12.5 INJECTION, SOLUTION INTRAMUSCULAR; INTRAVENOUS ONCE AS NEEDED
Status: DISCONTINUED | OUTPATIENT
Start: 2025-05-02 | End: 2025-05-02 | Stop reason: HOSPADM

## 2025-05-02 RX ORDER — DEXAMETHASONE SODIUM PHOSPHATE 10 MG/ML
INJECTION, SOLUTION INTRAMUSCULAR; INTRAVENOUS AS NEEDED
Status: DISCONTINUED | OUTPATIENT
Start: 2025-05-02 | End: 2025-05-02

## 2025-05-02 RX ORDER — CEFAZOLIN SODIUM 1 G/3ML
INJECTION, POWDER, FOR SOLUTION INTRAMUSCULAR; INTRAVENOUS AS NEEDED
Status: DISCONTINUED | OUTPATIENT
Start: 2025-05-02 | End: 2025-05-02

## 2025-05-02 RX ORDER — PANTOPRAZOLE SODIUM 40 MG/10ML
40 INJECTION, POWDER, LYOPHILIZED, FOR SOLUTION INTRAVENOUS ONCE
Status: COMPLETED | OUTPATIENT
Start: 2025-05-02 | End: 2025-05-02

## 2025-05-02 RX ORDER — HEPARIN SODIUM 5000 [USP'U]/ML
5000 INJECTION, SOLUTION INTRAVENOUS; SUBCUTANEOUS EVERY 8 HOURS SCHEDULED
Status: DISCONTINUED | OUTPATIENT
Start: 2025-05-02 | End: 2025-05-02

## 2025-05-02 RX ORDER — FENTANYL CITRATE 50 UG/ML
INJECTION, SOLUTION INTRAMUSCULAR; INTRAVENOUS AS NEEDED
Status: DISCONTINUED | OUTPATIENT
Start: 2025-05-02 | End: 2025-05-02

## 2025-05-02 RX ORDER — PROPOFOL 10 MG/ML
INJECTION, EMULSION INTRAVENOUS AS NEEDED
Status: DISCONTINUED | OUTPATIENT
Start: 2025-05-02 | End: 2025-05-02

## 2025-05-02 RX ORDER — LABETALOL HYDROCHLORIDE 5 MG/ML
10 INJECTION, SOLUTION INTRAVENOUS EVERY 6 HOURS PRN
Status: DISCONTINUED | OUTPATIENT
Start: 2025-05-02 | End: 2025-05-13 | Stop reason: HOSPADM

## 2025-05-02 RX ORDER — MAGNESIUM SULFATE HEPTAHYDRATE 40 MG/ML
4 INJECTION, SOLUTION INTRAVENOUS ONCE
Status: COMPLETED | OUTPATIENT
Start: 2025-05-02 | End: 2025-05-02

## 2025-05-02 RX ORDER — SODIUM CHLORIDE, SODIUM LACTATE, POTASSIUM CHLORIDE, CALCIUM CHLORIDE 600; 310; 30; 20 MG/100ML; MG/100ML; MG/100ML; MG/100ML
100 INJECTION, SOLUTION INTRAVENOUS CONTINUOUS
Status: DISCONTINUED | OUTPATIENT
Start: 2025-05-02 | End: 2025-05-02

## 2025-05-02 RX ORDER — ONDANSETRON 2 MG/ML
4 INJECTION INTRAMUSCULAR; INTRAVENOUS ONCE AS NEEDED
Status: DISCONTINUED | OUTPATIENT
Start: 2025-05-02 | End: 2025-05-02 | Stop reason: HOSPADM

## 2025-05-02 RX ORDER — ONDANSETRON 2 MG/ML
INJECTION INTRAMUSCULAR; INTRAVENOUS AS NEEDED
Status: DISCONTINUED | OUTPATIENT
Start: 2025-05-02 | End: 2025-05-02

## 2025-05-02 RX ORDER — HYDROMORPHONE HCL IN WATER/PF 6 MG/30 ML
0.2 PATIENT CONTROLLED ANALGESIA SYRINGE INTRAVENOUS EVERY 4 HOURS PRN
Status: CANCELLED | OUTPATIENT
Start: 2025-05-02

## 2025-05-02 RX ORDER — PANTOPRAZOLE SODIUM 40 MG/10ML
40 INJECTION, POWDER, LYOPHILIZED, FOR SOLUTION INTRAVENOUS
Status: DISCONTINUED | OUTPATIENT
Start: 2025-05-02 | End: 2025-05-02

## 2025-05-02 RX ADMIN — DEXAMETHASONE SODIUM PHOSPHATE 10 MG: 10 INJECTION INTRAMUSCULAR; INTRAVENOUS at 01:55

## 2025-05-02 RX ADMIN — ENOXAPARIN SODIUM 40 MG: 40 INJECTION SUBCUTANEOUS at 08:14

## 2025-05-02 RX ADMIN — ACETAMINOPHEN 1000 MG: 1000 INJECTION, SOLUTION INTRAVENOUS at 10:34

## 2025-05-02 RX ADMIN — PANTOPRAZOLE SODIUM 40 MG: 40 INJECTION, POWDER, LYOPHILIZED, FOR SOLUTION INTRAVENOUS at 08:12

## 2025-05-02 RX ADMIN — METRONIDAZOLE 500 MG: 500 INJECTION, SOLUTION INTRAVENOUS at 05:10

## 2025-05-02 RX ADMIN — POTASSIUM CHLORIDE 20 MEQ: 14.9 INJECTION, SOLUTION INTRAVENOUS at 15:20

## 2025-05-02 RX ADMIN — SODIUM CHLORIDE 100 ML/HR: 0.9 INJECTION, SOLUTION INTRAVENOUS at 08:12

## 2025-05-02 RX ADMIN — MAGNESIUM SULFATE HEPTAHYDRATE 4 G: 40 INJECTION, SOLUTION INTRAVENOUS at 08:13

## 2025-05-02 RX ADMIN — CALCIUM GLUCONATE 2 G: 20 INJECTION, SOLUTION INTRAVENOUS at 22:23

## 2025-05-02 RX ADMIN — PANTOPRAZOLE SODIUM 40 MG: 40 INJECTION, POWDER, FOR SOLUTION INTRAVENOUS at 20:30

## 2025-05-02 RX ADMIN — ONDANSETRON 4 MG: 2 INJECTION INTRAMUSCULAR; INTRAVENOUS at 01:20

## 2025-05-02 RX ADMIN — SUGAMMADEX 150 MG: 100 INJECTION, SOLUTION INTRAVENOUS at 03:13

## 2025-05-02 RX ADMIN — ONDANSETRON 4 MG: 2 INJECTION INTRAMUSCULAR; INTRAVENOUS at 07:48

## 2025-05-02 RX ADMIN — ACETAMINOPHEN 1000 MG: 1000 INJECTION, SOLUTION INTRAVENOUS at 15:17

## 2025-05-02 RX ADMIN — MORPHINE SULFATE 2 MG: 2 INJECTION, SOLUTION INTRAMUSCULAR; INTRAVENOUS at 00:02

## 2025-05-02 RX ADMIN — FENTANYL CITRATE 50 MCG: 50 INJECTION INTRAMUSCULAR; INTRAVENOUS at 01:55

## 2025-05-02 RX ADMIN — ONDANSETRON 4 MG: 2 INJECTION, SOLUTION INTRAMUSCULAR; INTRAVENOUS at 02:53

## 2025-05-02 RX ADMIN — SODIUM CHLORIDE, SODIUM LACTATE, POTASSIUM CHLORIDE, AND CALCIUM CHLORIDE: .6; .31; .03; .02 INJECTION, SOLUTION INTRAVENOUS at 01:43

## 2025-05-02 RX ADMIN — SODIUM CHLORIDE, SODIUM LACTATE, POTASSIUM CHLORIDE, AND CALCIUM CHLORIDE 100 ML/HR: .6; .31; .03; .02 INJECTION, SOLUTION INTRAVENOUS at 04:30

## 2025-05-02 RX ADMIN — HYDROMORPHONE HYDROCHLORIDE 0.5 MG: 1 INJECTION, SOLUTION INTRAMUSCULAR; INTRAVENOUS; SUBCUTANEOUS at 10:34

## 2025-05-02 RX ADMIN — LIDOCAINE HYDROCHLORIDE 50 MG: 10 INJECTION, SOLUTION EPIDURAL; INFILTRATION; INTRACAUDAL at 01:55

## 2025-05-02 RX ADMIN — POTASSIUM CHLORIDE 20 MEQ: 14.9 INJECTION, SOLUTION INTRAVENOUS at 20:39

## 2025-05-02 RX ADMIN — Medication 100 MG: at 01:55

## 2025-05-02 RX ADMIN — ROCURONIUM 30 MG: 50 INJECTION, SOLUTION INTRAVENOUS at 01:58

## 2025-05-02 RX ADMIN — PROPOFOL 150 MG: 10 INJECTION, EMULSION INTRAVENOUS at 01:55

## 2025-05-02 RX ADMIN — HYDROMORPHONE HYDROCHLORIDE 0.2 MG: 0.2 INJECTION, SOLUTION INTRAMUSCULAR; INTRAVENOUS; SUBCUTANEOUS at 06:03

## 2025-05-02 RX ADMIN — ONDANSETRON 4 MG: 2 INJECTION INTRAMUSCULAR; INTRAVENOUS at 20:12

## 2025-05-02 RX ADMIN — METRONIDAZOLE 500 MG: 500 INJECTION, SOLUTION INTRAVENOUS at 15:45

## 2025-05-02 RX ADMIN — PHENYLEPHRINE HYDROCHLORIDE 20 MCG/MIN: 50 INJECTION INTRAVENOUS at 02:17

## 2025-05-02 RX ADMIN — ACETAMINOPHEN 1000 MG: 1000 INJECTION, SOLUTION INTRAVENOUS at 21:26

## 2025-05-02 RX ADMIN — CHLORHEXIDINE GLUCONATE 15 ML: 1.2 SOLUTION ORAL at 20:30

## 2025-05-02 RX ADMIN — SODIUM CHLORIDE 1000 ML: 0.9 INJECTION, SOLUTION INTRAVENOUS at 00:46

## 2025-05-02 RX ADMIN — CEFTRIAXONE SODIUM 1000 MG: 10 INJECTION, POWDER, FOR SOLUTION INTRAVENOUS at 05:58

## 2025-05-02 RX ADMIN — CEFAZOLIN 2000 MG: 1 INJECTION, POWDER, FOR SOLUTION INTRAMUSCULAR; INTRAVENOUS at 02:05

## 2025-05-02 RX ADMIN — FENTANYL CITRATE 50 MCG: 50 INJECTION INTRAMUSCULAR; INTRAVENOUS at 03:20

## 2025-05-02 RX ADMIN — ROCURONIUM 10 MG: 50 INJECTION, SOLUTION INTRAVENOUS at 02:19

## 2025-05-02 RX ADMIN — SODIUM CHLORIDE, SODIUM LACTATE, POTASSIUM CHLORIDE, AND CALCIUM CHLORIDE: .6; .31; .03; .02 INJECTION, SOLUTION INTRAVENOUS at 03:13

## 2025-05-02 RX ADMIN — POTASSIUM CHLORIDE 20 MEQ: 14.9 INJECTION, SOLUTION INTRAVENOUS at 08:12

## 2025-05-02 NOTE — INTERVAL H&P NOTE
H&P reviewed. After examining the patient I find no changes in the patients condition since the H&P had been written.  Biliary dyskinesia    for lap juliane   Vitals:    05/02/25 0414   BP: 162/85   Pulse: 89   Resp: 15   Temp: (!) 97.3 °F (36.3 °C)   SpO2: 94%

## 2025-05-02 NOTE — ANESTHESIA POSTPROCEDURE EVALUATION
Post-Op Assessment Note    CV Status:  Stable  Pain Score: 2    Pain management: adequate       Mental Status:  Alert   Hydration Status:  Stable   PONV Controlled:  None   Airway Patency:  Patent  Two or more mitigation strategies used for obstructive sleep apnea   Post Op Vitals Reviewed: Yes    No anethesia notable event occurred.    Staff: Anesthesiologist           Last Filed PACU Vitals:  Vitals Value Taken Time   Temp 97.4 °F (36.3 °C) 05/02/25 0400   Pulse 76 05/02/25 0400   /79 05/02/25 0400   Resp 16 05/02/25 0400   SpO2 98 % 05/02/25 0400       Modified Jerrell:     Vitals Value Taken Time   Activity 2 05/02/25 0400   Respiration 2 05/02/25 0400   Circulation 2 05/02/25 0400   Consciousness 2 05/02/25 0400   Oxygen Saturation 2 05/02/25 0400     Modified Jerrell Score: 10

## 2025-05-02 NOTE — OCCUPATIONAL THERAPY NOTE
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  Active Problems:    SBO (small bowel obstruction) (HCC)    Past Medical History  Past Medical History:   Diagnosis Date    Anemia 06/12/2014    Transfused  3  units  of  blood    Anxiety     resolved: Oct 23, 2017    Cancer (HCC)     Colon cancer (HCC)     Disease of thyroid gland     hypothyroidism    GERD (gastroesophageal reflux disease)     History of Helicobacter infection     onset: 6/14    Hypertension     Obesity      Past Surgical History  Past Surgical History:   Procedure Laterality Date    BREAST SURGERY      COLECTOMY      Partial     COLON SURGERY      right hemicolectomy 2014    COLON SURGERY      COLONOSCOPY W/ POLYPECTOMY      ESOPHAGOGASTRODUODENOSCOPY      Diagnostic - 6/14 in [facility]     HERNIA REPAIR      right inguinal hernia repair 1989    LA COLONOSCOPY FLX DX W/COLLJ SPEC WHEN PFRMD N/A 01/11/2016    Procedure: COLONOSCOPY;  Surgeon: Flakito Molina MD;  [facility];  Service: Gastroenterology    LA COLONOSCOPY FLX DX W/COLLJ SPEC WHEN PFRMD N/A 03/11/2019    Procedure: COLONOSCOPY;  Surgeon: Flakito Molina MD;  [facility];  Service: Gastroenterology    TONSILLECTOMY      as a child        05/02/25 1120   OT Last Visit   OT Visit Date 05/02/25   Note Type   Note type Evaluation   Pain Assessment   Pain Assessment Tool FLACC   Pain Location/Orientation Location: Abdomen   Pain Rating: FLACC (Rest) - Face 0   Pain Rating: FLACC (Rest) - Legs 0   Pain Rating: FLACC (Rest) - Activity 0   Pain Rating: FLACC (Rest) - Cry 0   Pain Rating: FLACC (Rest) - Consolability 0   Score: FLACC (Rest) 0   Pain Rating: FLACC (Activity) - Face 1   Pain Rating: FLACC (Activity) - Legs 0   Pain Rating: FLACC (Activity) - Activity 0   Pain Rating: FLACC (Activity) - Cry 1   Pain Rating: FLACC (Activity) -  Consolability 0   Score: FLACC (Activity) 2   Restrictions/Precautions   Weight Bearing Precautions Per Order No   Other Precautions Chair Alarm;Bed Alarm;Multiple lines;Fall Risk;Pain  (NG tube to suction)   Home Living   Type of Home Mobile home  (4STE)   Home Layout One level   Bathroom Shower/Tub Tub/shower unit   Bathroom Toilet Standard   Bathroom Equipment   (denies)   Home Equipment   (denies)   Additional Comments pt resides with her  who is also currently hospitalized.   Prior Function   Level of [place] Independent with ADLs;Independent with functional mobility;Independent with IADLS   Lives With Spouse   IADLs Independent with driving;Independent with meal prep;Independent with medication management   Falls in the last 6 months 0   Vocational Retired   Lifestyle   Autonomy Pt reports that she is typically completely independent with ADLs, IADLs, and functional mobility without use of DME.  Pt is a .  Typically very active and does a lot around the house and in the yard   Reciprocal Relationships supportive  but also currently hospitalized.  They have 3 kids, however 2/3 live [place] the country.  Daughter lives in [place] but works and they report she can't assist   Service to Others retired   [employer] likes to be very active   ADL   Eating Assistance 5  Supervision/Setup   Grooming Assistance 5  Supervision/Setup   UB Bathing Assistance 5  Supervision/Setup   LB Bathing Assistance 4  Minimal Assistance   UB Dressing Assistance 5  Supervision/Setup   LB Dressing Assistance 3  Moderate Assistance   Toileting Assistance  5  Supervision/Setup   Additional Comments Primarily limited by abdominal pain post op   Bed Mobility   Supine to Sit 5  Supervision   Additional Comments Pt left seated in recliner with all needs within reach, chair alarm activated and RN updated   Transfers   Sit to Stand 5  Supervision   Stand to Sit 5  Supervision   Stand pivot 5   Supervision   Additional Comments no use of DME, no LOB   Functional Mobility   Functional Mobility 5  Supervision   Additional Comments to ambulate ~3 steps to chair.  Distance severely limited by NG tube to suction.  Would benefit from further trials to ensure she can mobilize household distances during ADL routine at home   Balance   Static Sitting Good   Static Standing Fair +   Ambulatory Fair +   Activity Tolerance   Activity Tolerance Patient limited by pain;Patient tolerated treatment well   Medical Staff Made Aware PT Calista   Nurse Made Aware Pt cleared by RN for OT evaluation and OOB mobility   RUE Assessment   RUE Assessment WFL   LUE Assessment   LUE Assessment WFL   Psychosocial   Psychosocial (WDL) WDL   Cognition   Arousal/Participation Alert;Responsive;Cooperative   Attention Within functional limits   Orientation Level Oriented X4   Memory Within functional limits   Following Commands Follows one step commands without difficulty   Comments Cognition presents WFL for her age   Assessment   Limitation Decreased ADL status;Decreased endurance;Decreased high-level ADLs;Decreased self-care trans   Prognosis Good   Assessment Pt is an 80 year old female seen for initial OT evaluation s/p admission to [facility] 5/1/25 with SBO 2* incarcerated R inguinal hernia.  Pt is POD0 s/p repair strangulated femoral hernia, small bowel resection.  Pt resides with her , who is also currently hospitalized.  They live in a mobile home with 4STE.  Typically pt is very active and independent with ADLs and functional mobility without use of DME.  Pt is currently demonstrating the following occupational deficits: UB ADLs with set-up/supervision, LB bathing with Tigre, LB dressing with modA, functional transfers and <household distance functional mobility with supervision.  Factors currently limiting pt's independence include: abdominal pain, limited support in home environment.  From an OT standpoint, recommend level III  rehab resources upon d/c.  See below for OT goals to be addressed during POC.  Pt is to continue to benefit from skilled occupational therapy services while in the hospital to maximize functioning and independence with daily activities.  See below for OT goals to be addressed 2-3x/wk.   Goals   Patient Goals to spend time with her    Plan   Treatment Interventions ADL retraining;Functional transfer training;UE strengthening/ROM;Endurance training;Patient/family training;Compensatory technique education;Equipment evaluation/education;Continued evaluation;Activityengagement   Goal Expiration Date 05/12/25   OT Treatment Day 1   OT Frequency 2-3x/wk   Discharge Recommendation   Rehab Resource Intensity Level, OT III (Minimum Resource Intensity)   AM-PAC Daily Activity Inpatient   Lower Body Dressing 3   Bathing 3   Toileting 3   Upper Body Dressing 3   Grooming 3   Eating 4   Daily Activity Raw Score 19   Daily Activity Standardized Score (Calc for Raw Score >=11) 40.22   AM-PAC Applied Cognition Inpatient   Following a Speech/Presentation 4   Understanding Ordinary Conversation 4   Taking Medications 4   Remembering Where Things Are Placed or Put Away 4   Remembering List of 4-5 Errands 4   Taking Care of Complicated Tasks 4   Applied Cognition Raw Score 24   Applied Cognition Standardized Score 62.21       Goals:  Pt will complete all ADLS with [place] with use of DME as appropriate.  Pt will complete functional transfers on/off all surfaces with [place] with good balance/safety.  Pt will complete household distance functional mobility with [place] with good balance, safety and endurance.    AIDEE Lane, OTR/L, CSRS  [place] License [id number]  [place] License [id number]

## 2025-05-02 NOTE — PLAN OF CARE
Problem: PAIN - ADULT  Goal: Verbalizes/displays adequate comfort level or baseline comfort level  Description: Interventions:- Encourage patient to monitor pain and request assistance- Assess pain using appropriate pain scale- Administer analgesics based on type and severity of pain and evaluate response- Implement non-pharmacological measures as appropriate and evaluate response- Consider cultural and social influences on pain and pain management- Notify physician/advanced practitioner if interventions unsuccessful or patient reports new pain  Outcome: Progressing     Problem: INFECTION - ADULT  Goal: Absence or prevention of progression during hospitalization  Description: INTERVENTIONS:- Assess and monitor for signs and symptoms of infection- Monitor lab/diagnostic results- Monitor all insertion sites, i.e. indwelling lines, tubes, and drains- Monitor endotracheal if appropriate and nasal secretions for changes in amount and color- [place] appropriate cooling/warming therapies per order- Administer medications as ordered- Instruct and encourage patient and family to use good hand hygiene technique- Identify and instruct in appropriate isolation precautions for identified infection/condition  Outcome: Progressing  Goal: Absence of fever/infection during neutropenic period  Description: INTERVENTIONS:- Monitor WBC  Outcome: Progressing     Problem: SAFETY ADULT  Goal: Patient will remain free of falls  Description: INTERVENTIONS:- Educate patient/family on patient safety including physical limitations- Instruct patient to call for assistance with activity - Consult OT/PT to assist with strengthening/mobility - Keep Call bell within reach- Keep bed low and locked with side rails adjusted as appropriate- Keep care items and personal belongings within reach- Initiate and maintain comfort rounds- Make Fall Risk Sign visible to staff- Offer Toileting every  Hours, in advance of need- Initiate/Maintain alarm- Obtain  necessary fall risk management equipment: - Apply yellow socks and bracelet for high fall risk patients- Consider moving patient to room near nurses station  Outcome: Progressing  Goal: Maintain or return to baseline ADL function  Description: INTERVENTIONS:-  Assess patient's ability to carry out ADLs; assess patient's baseline for ADL function and identify physical deficits which impact ability to perform ADLs (bathing, care of mouth/teeth, toileting, grooming, dressing, etc.)- Assess/evaluate cause of self-care deficits - Assess range of motion- Assess patient's mobility; develop plan if impaired- Assess patient's need for assistive devices and provide as appropriate- Encourage maximum independence but intervene and supervise when necessary- Involve family in performance of ADLs- Assess for home care needs following discharge - Consider OT consult to assist with ADL evaluation and planning for discharge- Provide patient education as appropriate  Outcome: Progressing  Goal: Maintains/Returns to pre admission functional level  Description: INTERVENTIONS:- Perform AM-PAC 6 Click Basic Mobility/ Daily Activity assessment daily.- Set and communicate daily mobility goal to care team and patient/family/caregiver. - Collaborate with rehabilitation services on mobility goals if consulted- Perform Range of Motion  times a day.- Reposition patient every  hours.- Dangle patient  times a day- Stand patient  times a day- Ambulate patient  times a day- Out of bed to chair  times a day - Out of bed for meals  times a day- Out of bed for toileting- Record patient progress and toleration of activity level   Outcome: Progressing     Problem: DISCHARGE PLANNING  Goal: Discharge to home or other facility with appropriate resources  Description: INTERVENTIONS:- Identify barriers to discharge w/patient and caregiver- Arrange for needed discharge resources and transportation as appropriate- Identify discharge learning needs (meds, wound  care, etc.)- Arrange for interpretive services to assist at discharge as needed- Refer to Case Management Department for coordinating discharge planning if the patient needs post-hospital services based on physician/advanced practitioner order or complex needs related to functional status, cognitive ability, or social support system  Outcome: Progressing     Problem: Knowledge Deficit  Goal: Patient/family/caregiver demonstrates understanding of disease process, treatment plan, medications, and discharge instructions  Description: Complete learning assessment and assess knowledge base.Interventions:- Provide teaching at level of understanding- Provide teaching via preferred learning methods  Outcome: Progressing

## 2025-05-02 NOTE — ED NOTES
Pt found to be in reverse trendelenburg, skin pale and clammy, BP 75/43 (MAP 55) HR 65. Surgery at bedside attempting to reduce right inguinal hernia. Liter of NSS infusion initiated per MD. Surery unable to reduce hernia (See MD notes), patient to be going to OR for repair. Hospital Supervisor and  Primary nurse updated           Freida Houser RN  05/02/25 0102

## 2025-05-02 NOTE — ASSESSMENT & PLAN NOTE
-Patient presented with abdominal pain, nausea, and vomiting  -Was found to have CT A/P with SBO secondary to strangulated right groin hernia  -Went to OR 5/2 Ex-lap with repair of strangulated femoral hernia and small bowel resection   -NPO with NGT in place  -Continue ceftriaxone and metronidazole  -Serial abdominal exams  -PT/OT evaluation

## 2025-05-02 NOTE — ED NOTES
1mL morphine administered to patient at 0002 by this nurse while provider, ISABELLA PATTERSON, was at the bedside attempting to reduce the right inguinal hernia. Provider and nurse stayed with patient for 15 minutes after administration. Patient vitals and appearance stable while she was in slight reverse trendelenburg. Provider was unable to reduce the inguinal hernia and gave verbal order to keep patient in slight reverse trendelenburg until surgical team came to attempt to reduce the hernia.           Livia Negrete RN  05/02/25 0547

## 2025-05-02 NOTE — PLAN OF CARE
Problem: PHYSICAL THERAPY ADULT  Goal: Performs mobility at highest level of function for planned discharge setting.  See evaluation for individualized goals.  Description: Treatment/Interventions: Functional transfer training, LE strengthening/ROM, Elevations, Therapeutic exercise, Endurance training, Equipment eval/education, Patient/family training, Bed mobility, Gait training, Compensatory technique education          See flowsheet documentation for full assessment, interventions and recommendations.  5/2/2025 1543 by Calista Pham PT  Note: Prognosis: Good  Problem List: Impaired balance, Decreased mobility, Pain, Decreased skin integrity  Assessment: Monica Avitia is a 80 y.o. Female who presents to [facility] on 5/1/[facility] due to abdominal pain and diagnosis of femoral hernia of R side w/ gangrene and obstruction. Orders for PT eval and treat received. Comorbidities affecting pt's functional mobility at time of evaluation include: SBO, osteopenia, pulmonary fibrosis, neuropathy, HTN. Personal factors affecting DC include: stairs to enter home. At baseline, pt mobilizes ind w/ no AD, and w/ 0 fall(s) in the previous 6 months. Upon evaluation, pt presents w/ the following deficits: decreased endurance/activity tolerance, pain affecting mobility, and gait deviations. Pt currently requires  supervision for bed mobility, supervision for transfers, supervision w/ no AD for ambulation. Pt's clinical presentation is unstable/unpredictable due to abnormal lab values, pain affecting mobility tolerance, ongoing medical management. From a PT/mobility standpoint given the above findings, DC recommendation is level: III (Minimum Rehab Resource Intensity), pending mobility progress. During current admission, pt will benefit from continued skilled inpatient PT in the acute care setting in order to address the above deficits and to maximize function and mobility prior to DC from acute care.        Rehab Resource Intensity  Level, PT: III (Minimum Resource Intensity) (pending increased overall mobility tolerance (pt limited due to NG tube to wall suction and nausea))    See flowsheet documentation for full assessment.

## 2025-05-02 NOTE — ED NOTES
1L NS bolus administration stopped per verbal provider order at 200 mL since patient's BP was 148/82.     Livia Negrete, RN  05/02/25 0153

## 2025-05-02 NOTE — H&P
H&P- General Surgery  : [facility] Surgery Resident role on [facility]  Monica Avitia 80 y.o. female MRN: [id number]  Unit/Bed#: ED-37 Encounter: [id number]        Assessment:  80 y.o. female with SBO 2/2 incarcerated right inguinal hernia.    Plan:  - NPO  - NGT  - OR for right inguinal hernia repair, possible exploratory laparotomy, possible SBR, and all other indicated procedures. Informed consent obtained. Discussed code status with the patient, patient full code in there perioperative period but would not want long-term ventilator dependence.  - PRN analgesia and antiemetics  - DVT ppx with Lvx  - Encourage OOB, ambulation, IS  - PT/OT Evaluation  - CM for dispo planning     HPI:  Monica Avitia is a 80 y.o. female with a history of HTN, RIHR in 1989, right hemicolectomy for colon cancer, hypothyroidism who presents with one day of nausea, vomiting, abdominal pain. The pain started this morning while she was visiting her  who is currently admitted at this hospital. The pain continued to worsen prompting her to seek evaluation. She denies prior similar episodes. No fevers or chills. Last bowel movement yesterday. Not on AC/AP medications. Last colonoscopy in 2019 with healthy-appearing end-to-side ileocolonic anastomosis. Sigmoid diverticulosis and internal hemorrhoids. CT AP was obtained which showed a small bowel obstruction secondary to entrapment of the bowel loop within a right groin hernia. She is independent in her ADLs and physically active. Otherwise in good health prior to this event. Labs notable for a WBC of 15.6, Hgb 14, Cr 0.68. Attempted bedside reduction of groin hernia which was not successful.     Physical Exam:  General: No acute distress, alert and oriented  CV: Well perfused, regular rate  Lungs: Normal work of breathing, no increased respiratory effort  Abdomen: Soft, tender in her right groin, no overlying skin changes, palpable right groin mass which is not  reducible.   Extremities: No edema, clubbing or cyanosis  Skin: Warm, dry      Review of Systems   Review of systems negative except as per HPI.       Objective         Intake/Output Summary (Last 24 hours) at 5/2/2025 0121  Last data filed at 5/2/2025 0104  Gross per 24 hour   Intake 200 ml   Output --   Net 200 ml       First Vitals:   Blood Pressure: (!) 192/93 (05/01/25 2043)  Pulse: 75 (05/01/25 2043)  Temperature: 97.7 °F (36.5 °C) (05/01/25 2049)  Temp Source: Oral (05/01/25 2049)  Respirations: 20 (05/01/25 2043)  SpO2: 98 % (05/01/25 2043)    Current Vitals:   Blood Pressure: 168/80 (05/02/25 0100)  Pulse: 73 (05/02/25 0100)  Temperature: 97.7 °F (36.5 °C) (05/01/25 2049)  Temp Source: Oral (05/01/25 2049)  Respirations: 18 (05/02/25 0100)  SpO2: 98 % (05/02/25 0100)    Invasive Devices       Peripheral Intravenous Line  Duration             Peripheral IV 05/01/25 Left Antecubital <1 day              Drain  Duration             NG/OG/Enteral Tube Nasogastric 18 Fr Right nare <1 day                    Imaging: I have personally reviewed pertinent reports.      CT abdomen pelvis with contrast  Result Date: 5/1/2025  Impression: Findings consistent with small bowel obstruction secondary to entrapment of the bowel loop within a right groin hernia. Recommend surgical consultation. I personally discussed this study with RADHA HARP on 5/1/2025 11:46 PM. Workstation performed: [id number]       EKG, Pathology, and Other Studies: I have personally reviewed pertinent reports.    VTE Pharmacologic Prophylaxis: VTE covered by:  enoxaparin, Subcutaneous     VTE Mechanical Prophylaxis: sequential compression device    Historical Information   Past Medical History:   Diagnosis Date    Anemia 06/12/2014    Transfused  3  units  of  blood    Anxiety     resolved: Oct 23, 2017    Cancer (HCC)     Colon cancer (HCC)     Disease of thyroid gland     hypothyroidism    GERD (gastroesophageal reflux disease)     History of  Helicobacter infection     onset: 6/14    Hypertension     Obesity      Past Surgical History:   Procedure Laterality Date    BREAST SURGERY      COLECTOMY      Partial     COLON SURGERY      right hemicolectomy 2014    COLON SURGERY      COLONOSCOPY W/ POLYPECTOMY      ESOPHAGOGASTRODUODENOSCOPY      Diagnostic - 6/14 in [facility]     HERNIA REPAIR      right inguinal hernia repair 1989    [place] COLONOSCOPY FLX DX W/COLLJ SPEC WHEN PFRMD N/A 01/11/2016    Procedure: COLONOSCOPY;  Surgeon: Flakito Molina MD;  Location: [facility];  Service: Gastroenterology    AZ COLONOSCOPY FLX DX W/COLLJ SPEC WHEN PFRMD N/A 03/11/2019    Procedure: COLONOSCOPY;  Surgeon: Flakito Molina MD;  Location: [facility];  Service: Gastroenterology    TONSILLECTOMY      as a child     Social History   Social History     Substance and Sexual Activity   Alcohol Use No     Social History     Substance and Sexual Activity   Drug Use No     Social History     Tobacco Use   Smoking Status Never   Smokeless Tobacco Never     Family History   Problem Relation Age of Onset    Hypertension Mother     Alcohol abuse Father     Alzheimer's disease Father     Hyperlipidemia Sister         3 living sisters     No Known Problems Brother     No Known Problems Maternal Grandmother     No Known Problems Maternal Grandfather     No Known Problems Paternal Grandmother     No Known Problems Paternal Grandfather        Meds/Allergies   all current active meds have been reviewed, current meds:   Current Facility-Administered Medications:     acetaminophen (Ofirmev) injection 1,000 mg, Q6H ALEXANDRA    heparin (porcine) subcutaneous injection 5,000 Units, Q8H ALEXANDRA    HYDROmorphone (DILAUDID) injection 0.5 mg, Q4H PRN    HYDROmorphone HCl (DILAUDID) injection 0.2 mg, Q4H PRN    lactated ringers infusion, Continuous    ondansetron (ZOFRAN) injection 4 mg, Q6H PRN    ondansetron (ZOFRAN) injection 4 mg, Once    pantoprazole (PROTONIX) injection 40 mg, Q24H ALEXANDRA and PTA meds:   Prior to  Admission Medications   Prescriptions Last Dose Informant Patient Reported? Taking?   Calcium Carb-Cholecalciferol 1000-800 MG-UNIT TABS Past Week Self Yes Yes   Sig: Take 1 tablet by mouth daily   Garlic 1000 MG CAPS Past Week Self Yes Yes   Sig: Take 1,000 mg by mouth daily.   Loratadine 10 MG CAPS 4/30/2025 Morning Self No Yes   Sig: Take 1 capsule (10 mg total) by mouth in the morning   Multiple Vitamins-Minerals (MULTIVITAMIN WITH MINERALS) tablet Past Week Self Yes Yes   Sig: Take 1 tablet by mouth daily.   amLODIPine (NORVASC) 2.5 mg tablet 5/1/2025 at  7:00 AM  No Yes   Sig: Take 1 tablet (2.5 mg total) by mouth every morning   atorvastatin (LIPITOR) 10 mg tablet 4/30/2025 Evening  No Yes   Sig: Take 1 tablet (10 mg total) by mouth daily   hydroCHLOROthiazide 25 mg tablet 4/30/2025 Morning  No Yes   Sig: Take 1 tablet (25 mg total) by mouth every morning   levothyroxine 75 mcg tablet 5/1/2025 Morning Self No Yes   Sig: Take 1 tablet (75 mcg total) by mouth every morning      Facility-Administered Medications: None     No Known Allergies    Lab Results: I have personally reviewed pertinent lab results.  , CBC:   Lab Results   Component Value Date    WBC 15.64 (H) 05/01/2025    HGB 14.0 05/01/2025    HCT 42.2 05/01/2025    MCV 83 05/01/2025     05/01/2025    RBC 5.11 05/01/2025    MCH 27.4 05/01/2025    MCHC 33.2 05/01/2025    RDW 13.1 05/01/2025    MPV 8.9 05/01/2025    NRBC 0 05/01/2025   , CMP:   Lab Results   Component Value Date    SODIUM 129 (L) 05/01/2025    K 3.2 (L) 05/01/2025    CL 89 (L) 05/01/2025    CO2 29 05/01/2025    BUN 12 05/01/2025    CREATININE 0.68 05/01/2025    CALCIUM 9.6 05/01/2025    AST 23 05/01/2025    ALT 12 05/01/2025    ALKPHOS 51 05/01/2025    EGFR 82 05/01/2025       Counseling / Coordination of Care  Total floor / unit time spent today 25 minutes.  Greater than 50% of total time was spent with the patient and / or family counseling and / or coordination of  care.        Brandan Almonte MD  General Surgery   05/02/25

## 2025-05-02 NOTE — OP NOTE
OPERATIVE REPORT  PATIENT NAME: Monica Avitia    :  1945  MRN: [id number]  Pt Location: [facility] OR ROOM 01    SURGERY DATE: 2025    Surgeons and Role:     * Levi Verdugo MD - Primary     * Brandan Almonte MD    Preop Diagnosis:  SBO (small bowel obstruction) (HCC) [K56.609]  Right inguinal hernia [K40.90]    Postop Diagnosis:  Femoral hernia of right side with gangrene and obstruction    Procedure(s):  Right - REPAIR STRANGULATED FEMORAL HERNIA. SMALL BOWEL RESECTION    Specimen(s):  ID Type Source Tests Collected by Time Destination   1 : small intestine Tissue Small Bowel, NOS TISSUE EXAM Levi Verdugo MD 2025 0240        Estimated Blood Loss:   Minimal    Drains:  NG/OG/Enteral Tube Nasogastric 18 Fr Right nare (Active)   Placement Reverification Auscultation 25 0116   Site Assessment Clean;Dry;Intact 25 0330   Securement Assessment Adhesive Securement 25 0330   Marking at Nare (cm) - For ongoing assessment of tube placement 65 cm 25 0116   Drainage Appearance Green;Cloudy;Thin 25 0116   Output (mL) 230 mL 25 0116   Number of days: 0       Anesthesia Type:   General    Operative Indications:  SBO (small bowel obstruction) (HCC) [K56.609]  Right femoral hernia      Operative Findings:  Strangulated right femoral hernia with small area of necrotic small bowel. SBR performed with side-to-side functional end-to-end anastomosis.       Complications:   None    Procedure and Technique:  The patient was seen  in the Holding Room. The risks, benefits, complications, treatment options, and expected outcomes were discussed with the patient.  The site of surgery was properly noted/marked. The patient was taken to the Operating Room, identified as Monica Avitia and the procedure verified as hernia repair.     The patient was placed in the supine position and underwent induction of anesthesia, the lower abdomen and groin was prepped and  draped in the standard fashion. A timeout was performed.  A vertical incision was made sharply overlying the palpable femoral hernia. This incision was carried down hernia sac using electrocautery. Care was made to place surgical clips on lymphatics encountered. The hernia sac was dissected free from surrounding tissue. The hernia sac was incised with reactive fluid encountered. Bowel was noted to be strangulated in the femoral hernia with a short segment of necrotic small bowel. The MEKHI-80 with a purple load was used on either end of the small intestine and the mesentery was taken with 3-0 PDS suture ligation between Sari clamps.  The anastomosis performed in a side-to-side fashion.  A enterotomy was made on either anti-mesenteric side of the small bowel and the stapler was used to perform a side-to-side anastomosis. The common enterotomy was closed with a TA-60 stapler. The mesenteric defect was closed with a running 3-0 PDS suture. The anastomosis was inspected and was in proper orientation. The small bowel anastomosis was then reduced inside of the abdomen. The hernia sac was closed with running 3-0 PDS suture. The wound was then irrigated copiously. The femoral vein was then identified and it's location relative to the defect was noted. The hernia defect was then closed with three interrupted 0 Vicryl sutures between the inguinal ligament and Ghanshyam's ligament. Amy's fascia was then closed with interrupted 3-0 PDS. Interrupted 3-0 PDS was used for deep dermal closure. Interrupted 4-0 Monocryl sutures were used to re-approximate the skin.Three pam were placed in the incision.    Instrument, sponge, and needle counts were correct prior to closure and at the conclusion of the case.       Patient Disposition:  PACU  and extubated and stable             SIGNATURE: rBandan Almonte MD  DATE: May 2, 2025  TIME: 3:38 AM

## 2025-05-02 NOTE — ED PROVIDER NOTES
Time reflects when diagnosis was documented in both MDM as applicable and the Disposition within this note       Time User Action Codes Description Comment    5/2/2025 12:47 AM Brandan lAmonte Add [K56.609] SBO (small bowel obstruction) (HCC)     5/2/2025 12:48 AM Brandan Almonte Add [K40.90] Right inguinal hernia     5/2/2025 12:57 AM EllenDaphne lynnissa Modify [K56.609] SBO (small bowel obstruction) (HCC)     5/2/2025 12:57 AM Carmine, Goldie Add [K40.30] Incarcerated inguinal hernia     5/2/2025 12:57 AM Carmine, Goldie Add [E87.1] Hyponatremia     5/2/2025 12:57 AM Carmine, Goldie Add [E87.6] Hypokalemia     5/2/2025  3:01 AM Lombardi, Maureen Modify [K56.609] SBO (small bowel obstruction) ([facility])     5/2/2025  3:01 AM Lombardi, Maureen Modify [K40.90] Right inguinal hernia           ED Disposition       ED Disposition   Admit    Condition   Stable    Date/Time   Fri May 2, 2025 12:57 AM    Comment   Case was discussed with Dr. Almonte and the patient's admission status was agreed to be Admission Status: inpatient status to the service of Dr. Verdugo.               Assessment & Plan       Medical Decision Making  Patient seen, examined and noted to have small bowel obstruction, incarcerated inguinal hernia, hyponatremia and hypokalemia.  Patient coming in after developing sudden onset abdominal pain, nausea and vomiting and bloating around 4 PM today.  Dates she had a turkey sandwich right before the pain began and is concerned she now has food poisoning.  Due to patient's significant abdominal tenderness and surgical history will initiate labs and imaging.  Patient has an elevated white blood cell count to 15, although I suspect that this is secondary to vomiting will initiate sepsis workup.  Patient not complaining of groin pain or hernia however identified a right inguinal hernia on CT scan. Attempted reduction in trendelenburg and after ice and was unable to reduce the hernia.  Consulted surgery who came down and  was also unable to reduce the hernia so patient was taken to the OR. After surgery attempted reduction patient vasovagaled and pressure went down into the 70s systolic. Pressure came up after fluids.     Differential diagnosis includes but is not limited to food poisoning, viral gastritis, SBO, appendicitis, colitis    Patient's labs notable for: Sodium of 129, potassium of 3.2, white blood cell count of 15.64    Imaging revealed:   CT abdomen showing small bowel obstruction secondary to entrapped loop of bowel within the right groin hernia    Discussed patient's case with Dr. Almonte (General Surgery resident) regarding admission who accepted the patient for further evaluation and management.    All labs reviewed and utilized in the medical decision making process.    Amount and/or Complexity of Data Reviewed  Labs: ordered. Decision-making details documented in ED Course.  Radiology: ordered.    Risk  Prescription drug management.  Decision regarding hospitalization.        ED Course as of 05/02/25 0414   Thu May 01, 2025   2154 WBC(!): 15.64   2231 Sodium(!): 129   2231 Potassium(!): 3.2   2354 LACTIC ACID: 1.1       Medications   lactated ringers infusion (has no administration in time range)   ondansetron (ZOFRAN) injection 4 mg (4 mg Intravenous Given 5/2/25 0120)   acetaminophen (Ofirmev) injection 1,000 mg (has no administration in time range)   HYDROmorphone HCl (DILAUDID) injection 0.2 mg (has no administration in time range)   HYDROmorphone (DILAUDID) injection 0.5 mg (has no administration in time range)   pantoprazole (PROTONIX) injection 40 mg (has no administration in time range)   enoxaparin (LOVENOX) subcutaneous injection 40 mg (has no administration in time range)   acetaminophen (Ofirmev) injection 1,000 mg (0 mg Intravenous Stopped 5/1/25 2236)   ondansetron (ZOFRAN) injection 4 mg (4 mg Intravenous Given 5/1/25 2150)   iohexol (OMNIPAQUE) 350 MG/ML injection (MULTI-DOSE) 100 mL (85 mL Intravenous  Given 5/1/25 2238)   morphine injection 2 mg (2 mg Intravenous Given 5/2/25 0002)   sodium chloride 0.9 % bolus 1,000 mL (0 mL Intravenous Stopped 5/2/25 0104)       ED Risk Strat Scores                    No data recorded        SBIRT 22yo+      Flowsheet Row Most Recent Value   Initial Alcohol Screen: US AUDIT-C     1. How often do you have a drink containing alcohol? 0 Filed at: 05/01/2025 2043   2. How many drinks containing alcohol do you have on a typical day you are drinking?  0 Filed at: 05/01/2025 2043   3a. Male UNDER 65: How often do you have five or more drinks on one occasion? 0 Filed at: 05/01/2025 2043   3b. FEMALE Any Age, or MALE 65+: How often do you have 4 or more drinks on one occassion? 0 Filed at: 05/01/2025 2043   Audit-C Score 0 Filed at: 05/01/2025 2043   ASHLEY: How many times in the past year have you...    Used an illegal drug or used a prescription medication for non-medical reasons? Never Filed at: 05/01/2025 2043                            History of Present Illness       Chief Complaint   Patient presents with    Abdominal Pain     Pt reports was with her  who is admitted upstairs ate the turkey dinner in his room with him and started with RLQ abd pain radiating to R groin with nausea immediatly after at 1700. Denies urinary complaints/Cp/SOB.        Past Medical History:   Diagnosis Date    Anemia 06/12/2014    Transfused  3  units  of  blood    Anxiety     resolved: Oct 23, 2017    Cancer (HCC)     Colon cancer (HCC)     Disease of thyroid gland     hypothyroidism    GERD (gastroesophageal reflux disease)     History of Helicobacter infection     onset: 6/14    Hypertension     Obesity       Past Surgical History:   Procedure Laterality Date    BREAST SURGERY      COLECTOMY      Partial     COLON SURGERY      right hemicolectomy 2014    COLON SURGERY      COLONOSCOPY W/ POLYPECTOMY      ESOPHAGOGASTRODUODENOSCOPY      Diagnostic - 6/14 in [facility]     HERNIA REPAIR      right  inguinal hernia repair 1989    [place] COLONOSCOPY FLX DX W/COLLJ SPEC WHEN PFRMD N/A 01/11/2016    Procedure: COLONOSCOPY;  Surgeon: Flakito Molina MD;  [facility]: [facility];  Service: Gastroenterology    [place] COLONOSCOPY FLX DX W/COLLJ SPEC WHEN PFRMD N/A 03/11/2019    Procedure: COLONOSCOPY;  Surgeon: Flakito Molina MD;  [facility];  Service: Gastroenterology    TONSILLECTOMY      as a child      Family History   Problem Relation Age of Onset    Hypertension Mother     Alcohol abuse Father     Alzheimer's disease Father     Hyperlipidemia Sister         3 living sisters     No Known Problems Brother     No Known Problems Maternal Grandmother     No Known Problems Maternal Grandfather     No Known Problems Paternal Grandmother     No Known Problems Paternal Grandfather       Social History     Tobacco Use    Smoking status: Never    Smokeless tobacco: Never   Vaping Use    Vaping status: Never Used   Substance Use Topics    Alcohol use: No    Drug use: No      E-Cigarette/Vaping    E-Cigarette Use Never User       E-Cigarette/Vaping Substances    Nicotine No     THC No     CBD No     Flavoring No     Other No     Unknown No       I have reviewed and agree with the history as documented.     Monica Avitia is a 80 y.o. female with a PMH of remote right hernia repair, right hemicolectomy for colon cancer presenting to the ED on May 2, 2025 with abdominal pain. Patient endorses that around 4pm today she started to have acute onset abdominal pain, nausea, vomiting and bloating. Was here in the hospital visiting her  and eating a turkey sandwich prior to onset of pain.  Felt fine before this and had a normal bowel movement this morning.  Patient denies diarrhea, fevers, chills, urinary symptoms or any other complaints at this time.             Review of Systems   Constitutional:  Positive for chills. Negative for fever.   Respiratory:  Negative for shortness of breath.    Cardiovascular:  Negative for  chest pain and palpitations.   Gastrointestinal:  Positive for abdominal pain, nausea and vomiting. Negative for diarrhea.   Genitourinary:  Negative for dysuria, frequency, hematuria and urgency.           Objective       ED Triage Vitals   Temperature Pulse Blood Pressure Respirations SpO2 Patient Position - Orthostatic VS   05/01/25 2049 05/01/25 2043 05/01/25 2043 05/01/25 2043 05/01/25 2043 05/01/25 2043   97.7 °F (36.5 °C) 75 (!) 192/93 20 98 % Lying      Temp Source Heart Rate Source BP Location FiO2 (%) Pain Score    05/01/25 2049 05/01/25 2043 05/01/25 2043 -- 05/01/25 2041    Oral Monitor Right arm  10 - Worst Possible Pain      Vitals      Date and Time Temp Pulse SpO2 Resp BP Pain Score FACES Pain Rating User   05/02/25 0400 97.4 °F (36.3 °C) 76 98 % 16 165/79 No Pain -- GB   05/02/25 0345 97.5 °F (36.4 °C) 82 98 % 15 180/84 No Pain -- GB   05/02/25 0330 97.5 °F (36.4 °C) 84 99 % 16 186/81 No Pain -- GB   05/02/25 0130 -- -- -- -- 178/81 -- --    05/02/25 0100 -- 73 98 % 18 168/80 -- -- DEBORAH   05/02/25 0045 -- 66 94 % 18 97/55 -- -- DEBORAH   05/02/25 0030 -- 65 95 % 16 75/43 -- -- [facility]   05/02/25 0002 -- -- -- -- -- 7 --    05/01/25 2315 -- 84 97 % -- 180/119 -- -- KB   05/01/25 2200 -- 69 97 % --  185/88 -- -- KB   05/01/25 2145 -- 77 98 % --  191/86 -- -- KB   05/01/25 2115 -- 74 96 % -- 178/96 -- -- KB   05/01/25 2100 -- 77 98 % -- 177/96 -- --    05/01/25 2049 97.7 °F (36.5 °C) -- -- -- -- -- -- KB   05/01/25 2043 -- 75 98 % 20 192/93 10 - Worst Possible Pain -- KW   05/01/25 2041 -- -- -- -- -- 10 - Worst Possible Pain -- KW            Physical Exam  Vitals and nursing note reviewed.   Constitutional:       General: She is not in acute distress.     Appearance: Normal appearance. She is well-developed and normal weight. She is not ill-appearing, toxic-appearing or diaphoretic.   HENT:      Head: Normocephalic and atraumatic.      Nose: Nose normal. No congestion or rhinorrhea.      Mouth/Throat:       Mouth: Mucous membranes are moist.   Eyes:      General: No scleral icterus.        Right eye: No discharge.         Left eye: No discharge.      Extraocular Movements: Extraocular movements intact.      Conjunctiva/sclera: Conjunctivae normal.   Cardiovascular:      Rate and Rhythm: Normal rate and regular rhythm.      Heart sounds: Normal heart sounds. No murmur heard.     No friction rub. No gallop.   Pulmonary:      Effort: Pulmonary effort is normal. No respiratory distress.      Breath sounds: Normal breath sounds. No wheezing, rhonchi or rales.   Abdominal:      General: Bowel sounds are normal. There is distension.      Palpations: Abdomen is soft.      Tenderness: There is abdominal tenderness in the right lower quadrant and suprapubic area. There is guarding. There is no right CVA tenderness, left CVA tenderness or rebound. Negative signs include Padilla's sign, Rovsing's sign and McBurney's sign.      Hernia: A hernia is present. Hernia is present in the right inguinal area.   Musculoskeletal:      Cervical back: Normal range of motion and neck supple. No rigidity.   Skin:     Capillary Refill: Capillary refill takes less than 2 seconds.   Neurological:      General: No focal deficit present.      Mental Status: She is alert and oriented to person, place, and time. Mental status is at baseline.      Motor: No weakness.      Gait: Gait normal.   Psychiatric:         Mood and Affect: Mood normal.         Behavior: Behavior normal.         Results Reviewed       Procedure Component Value Units Date/Time    Basic metabolic panel [[id number]]     Lab Status: No result Specimen: Blood     Magnesium [[id number]]     Lab Status: No result Specimen: Blood     Phosphorus [[id number]]     Lab Status: No result Specimen: Blood     CBC and differential [[id number]]     Lab Status: No result Specimen: Blood     Procalcitonin [[id number]]  (Normal) Collected: 05/01/25 2142    Lab Status: Final result Specimen: Blood from  Arm, Left Updated: 05/02/25 0158     Procalcitonin <0.05 ng/ml     FLU/COVID Rapid Antigen (30 min. TAT) - Preferred screening test in ED [[id number]]  (Normal) Collected: 05/02/25 0108    Lab Status: Final result Specimen: Nares from Nose Updated: 05/02/25 0130     SARS COV Rapid Antigen Negative     Influenza A Rapid Antigen Negative     Influenza B Rapid Antigen Negative    Narrative:      This test has been performed using the [employer] Migdalia 2 FLU+SARS Antigen test under the Emergency Use Authorization (EUA). This test has been validated by the  and verified by the performing laboratory. The Migdalia uses lateral flow immunofluorescent sandwich assay to detect SARS-COV, Influenza A and Influenza B Antigen.     The [employer] Migdalia 2 SARS Antigen test does not differentiate between SARS-CoV and SARS-CoV-2.     Negative results are presumptive and may be confirmed with a molecular assay, if necessary, for patient management. Negative results do not rule out SARS-CoV-2 or influenza infection and should not be used as the sole basis for treatment or patient management decisions. A negative test result may occur if the level of antigen in a sample is below the limit of detection of this test.     Positive results are indicative of the presence of viral antigens, but do not rule out bacterial infection or co-infection with other viruses.     All test results should be used as an adjunct to clinical observations and other information available to the provider.    FOR PEDIATRIC PATIENTS - copy/paste COVID Guidelines URL to browser: [email or web address]    Urine Microscopic [[id number]]  (Normal) Collected: 05/01/25 2350    Lab Status: Final result Specimen: Urine, Clean Catch Updated: 05/02/25 0027     RBC, UA 1-2 /hpf      WBC, UA None Seen /hpf      Epithelial Cells Occasional /hpf      Bacteria, UA None Seen /hpf     UA w Reflex to Microscopic w Reflex to Culture  [[id number]]  (Abnormal) Collected: 05/01/25 2350    Lab Status: Final result Specimen: Urine, Clean Catch Updated: 05/02/25 0015     Color, UA Colorless     Clarity, UA Clear     Specific Gravity, UA >=1.050     pH, UA 7.5     Leukocytes, UA Negative     Nitrite, UA Negative     Protein, UA Trace mg/dl      Glucose, UA Negative mg/dl      Ketones, UA 20 (1+) mg/dl      Urobilinogen, UA <2.0 mg/dl      Bilirubin, UA Negative     Occult Blood, UA Trace    Blood culture #1 [[id number]] Collected: 05/01/25 2346    Lab Status: In process Specimen: Blood from Arm, Left Updated: 05/01/25 2355    Lactic acid [[id number]]  (Normal) Collected: 05/01/25 2307    Lab Status: Final result Specimen: Blood from Arm, Right Updated: 05/01/25 2346     LACTIC ACID 1.1 mmol/L     Narrative:      Result may be elevated if tourniquet was used during collection.    Protime-INR [[id number]]  (Normal) Collected: 05/01/25 2307    Lab Status: Final result Specimen: Blood from Arm, Right Updated: 05/01/25 2338     Protime 13.6 seconds      INR 0.97    Narrative:      INR Therapeutic Range    Indication                                             INR Range      Atrial Fibrillation                                               2.0-3.0  Hypercoagulable State                                    2.0.2.3  Left Ventricular Asist Device                            2.0-3.0  Mechanical Heart Valve                                  -    Aortic(with afib, MI, embolism, HF, LA enlargement,    and/or coagulopathy)                                     2.0-3.0 (2.5-3.5)     Mitral                                                             2.5-3.5  Prosthetic/Bioprosthetic Heart Valve               2.0-3.0  Venous thromboembolism (VTE: VT, PE        2.0-3.0    APTT [[id number]]  (Normal) Collected: 05/01/25 2307    Lab Status: Final result Specimen: Blood from Arm, Right Updated: 05/01/25 2338     PTT 25 seconds     Blood culture #2 [[id number]] Collected: 05/01/25  2307    Lab Status: In process Specimen: Blood from Arm, Right Updated: 05/01/25 2316    Comprehensive metabolic panel [[id number]]  (Abnormal) Collected: 05/01/25 2142    Lab Status: Final result Specimen: Blood from Arm, Left Updated: 05/01/25 2215     Sodium 129 mmol/L      Potassium 3.2 mmol/L      Chloride 89 mmol/L      CO2 29 mmol/L      ANION GAP 11 mmol/L      BUN 12 mg/dL      Creatinine 0.68 mg/dL      Glucose 108 mg/dL      Calcium 9.6 mg/dL      AST 23 U/L      ALT 12 U/L      Alkaline Phosphatase 51 U/L      Total Protein 7.9 g/dL      Albumin 4.5 g/dL      Total Bilirubin 0.74 mg/dL      eGFR 82 ml/min/1.73sq m     Narrative:      National Kidney Disease Foundation guidelines for Chronic Kidney Disease (CKD):     Stage 1 with normal or high GFR (GFR > 90 mL/min/1.73 square meters)    Stage 2 Mild CKD (GFR = 60-89 mL/min/1.73 square meters)    Stage 3A Moderate CKD (GFR = 45-59 mL/min/1.73 square meters)    Stage 3B Moderate CKD (GFR = 30-44 mL/min/1.73 square meters)    Stage 4 Severe CKD (GFR = 15-29 mL/min/1.73 square meters)    Stage 5 End Stage CKD (GFR <15 mL/min/1.73 square meters)  Note: GFR calculation is accurate only with a steady state creatinine    Lipase [[id number]]  (Normal) Collected: 05/01/25 2142    Lab Status: Final result Specimen: Blood from Arm, Left Updated: 05/01/25 2215     Lipase 17 u/L     CBC and differential [[id number]]  (Abnormal) Collected: 05/01/25 2142    Lab Status: Final result Specimen: Blood from Arm, Left Updated: 05/01/25 2150     WBC 15.64 Thousand/uL      RBC 5.11 Million/uL      Hemoglobin 14.0 g/dL      Hematocrit 42.2 %      MCV 83 fL      MCH 27.4 pg      MCHC 33.2 g/dL      RDW 13.1 %      MPV 8.9 fL      Platelets 299 Thousands/uL      nRBC 0 /100 WBCs      Segmented % 84 %      Immature Grans % 0 %      Lymphocytes % 11 %      Monocytes % 5 %      Eosinophils Relative 0 %      Basophils Relative 0 %      Absolute Neutrophils 12.93 Thousands/µL       Absolute Immature Grans 0.07 Thousand/uL      Absolute Lymphocytes 1.78 Thousands/µL      Absolute Monocytes 0.79 Thousand/µL      Eosinophils Absolute 0.02 Thousand/µL      Basophils Absolute 0.05 Thousands/µL             CT abdomen pelvis with contrast   Final Interpretation by Michael Conway MD (05/01 3514)      Findings consistent with small bowel obstruction secondary to entrapment of the bowel loop within a right groin hernia. Recommend surgical consultation.         I personally discussed this study with RADHA HARP on 5/1/2025 11:46 PM.            Workstation performed: [id number]             Procedures    ED Medication and Procedure Management   Prior to Admission Medications   Prescriptions Last Dose Informant Patient Reported? Taking?   Calcium Carb-Cholecalciferol 1000-800 MG-UNIT TABS Past Week Self Yes Yes   Sig: Take 1 tablet by mouth daily   Garlic 1000 MG CAPS Past Week Self Yes Yes   Sig: Take 1,000 mg by mouth daily.   Loratadine 10 MG CAPS 4/30/2025 Morning Self No Yes   Sig: Take 1 capsule (10 mg total) by mouth in the morning   Multiple Vitamins-Minerals (MULTIVITAMIN WITH MINERALS) tablet Past Week Self Yes Yes   Sig: Take 1 tablet by mouth daily.   amLODIPine (NORVASC) 2.5 mg tablet 5/1/2025 at  7:00 AM  No Yes   Sig: Take 1 tablet (2.5 mg total) by mouth every morning   atorvastatin (LIPITOR) 10 mg tablet 4/30/2025 Evening  No Yes   Sig: Take 1 tablet (10 mg total) by mouth daily   hydroCHLOROthiazide 25 mg tablet 4/30/2025 Morning  No Yes   Sig: Take 1 tablet (25 mg total) by mouth every morning   levothyroxine 75 mcg tablet 5/1/2025 Morning Self No Yes   Sig: Take 1 tablet (75 mcg total) by mouth every morning      Facility-Administered Medications: None     Current Discharge Medication List        CONTINUE these medications which have NOT CHANGED    Details   amLODIPine (NORVASC) 2.5 mg tablet Take 1 tablet (2.5 mg total) by mouth every morning  Qty: 90 tablet, Refills: 1     Associated Diagnoses: Essential hypertension      atorvastatin (LIPITOR) 10 mg tablet Take 1 tablet (10 mg total) by mouth daily  Qty: 90 tablet, Refills: 1    Associated Diagnoses: Other hyperlipidemia      Calcium Carb-Cholecalciferol 1000-800 MG-UNIT TABS Take 1 tablet by mouth daily      Garlic 1000 MG CAPS Take 1,000 mg by mouth daily.      hydroCHLOROthiazide 25 mg tablet Take 1 tablet (25 mg total) by mouth every morning  Qty: 90 tablet, Refills: 1    Associated Diagnoses: Essential hypertension      levothyroxine 75 mcg tablet Take 1 tablet (75 mcg total) by mouth every morning  Qty: 90 tablet, Refills: 0    Associated Diagnoses: Acquired hypothyroidism      Loratadine 10 MG CAPS Take 1 capsule (10 mg total) by mouth in the morning  Qty: 90 capsule, Refills: 0    Associated Diagnoses: Seasonal allergies      Multiple Vitamins-Minerals (MULTIVITAMIN WITH MINERALS) tablet Take 1 tablet by mouth daily.           No discharge procedures on file.  ED SEPSIS DOCUMENTATION   Time reflects when diagnosis was documented in both MDM as applicable and the Disposition within this note       Time User Action Codes Description Comment    5/2/2025 12:47 AM Brandan Almonte Add [K56.609] SBO (small bowel obstruction) ([facility])     5/2/2025 12:48 AM Brandan Almonte Add [K40.90] Right inguinal hernia     5/2/2025 12:57 AM Goldie Hughes Modify [K56.609] SBO (small bowel obstruction) ([facility])     5/2/2025 12:57 AM Goldie Hughes Add [K40.30] Incarcerated inguinal hernia     5/2/2025 12:57 AM Goldie Hughes Add [E87.1] Hyponatremia     5/2/2025 12:57 AM Goldie Hughes Add [E87.6] Hypokalemia     5/2/2025  3:01 AM Lombardi, Maureen Modify [K56.609] SBO (small bowel obstruction) ([facility])     5/2/2025  3:01 AM Lombardi, Maureen Modify [K40.90] Right inguinal hernia                  Goldie Hughes PA-C  05/02/25 0414

## 2025-05-02 NOTE — PLAN OF CARE
Problem: OCCUPATIONAL THERAPY ADULT  Goal: Performs self-care activities at highest level of function for planned discharge setting.  See evaluation for individualized goals.  Description: Treatment Interventions: ADL retraining, Functional transfer training, UE strengthening/ROM, Endurance training, Patient/family training, Compensatory technique education, Equipment evaluation/education, Continued evaluation, Activityengagement          See flowsheet documentation for full assessment, interventions and recommendations.   Note: Limitation: Decreased ADL status, Decreased endurance, Decreased high-level ADLs, Decreased self-care trans  Prognosis: Good  Assessment: Pt is an 80 year old female seen for initial OT evaluation s/p admission to [facility] 5/1/25 with SBO 2* incarcerated R inguinal hernia.  Pt is POD0 s/p repair strangulated femoral hernia, small bowel resection.  Pt resides with her , who is also currently hospitalized.  They live in a mobile home with 4STE.  Typically pt is very active and independent with ADLs and functional mobility without use of DME.  Pt is currently demonstrating the following occupational deficits: UB ADLs with set-up/supervision, LB bathing with Tigre, LB dressing with modA, functional transfers and <household distance functional mobility with supervision.  Factors currently limiting pt's independence include: abdominal pain, limited support in home environment.  From an OT standpoint, recommend level III rehab resources upon d/c.  See below for OT goals to be addressed during POC.  Pt is to continue to benefit from skilled occupational therapy services while in the hospital to maximize functioning and independence with daily activities.  See below for OT goals to be addressed 2-3x/wk.     Rehab Resource Intensity Level, OT: III (Minimum Resource Intensity)

## 2025-05-02 NOTE — PHYSICAL THERAPY NOTE
PHYSICAL THERAPY EVALUATION NOTE          Patient Name: Monica Avitia  Today's Date: 2025          AGE:   80 y.o.  Mrn:   [id number]  ADMIT DX:  Hypokalemia [E87.6]  Hyponatremia [E87.1]  SBO (small bowel obstruction) (HCC) [K56.609]  Right inguinal hernia [K40.90]  Incarcerated inguinal hernia [K40.30]    Past Medical History:  Past Medical History:   Diagnosis Date    Anemia 2014    Transfused  3  units  of  blood    Anxiety     resolved: Oct 23, 2017    Cancer (HCC)     Colon cancer (HCC)     Disease of thyroid gland     hypothyroidism    GERD (gastroesophageal reflux disease)     History of Helicobacter infection     onset:     Hypertension     Obesity        Past Surgical History:  Past Surgical History:   Procedure Laterality Date    BREAST SURGERY      COLECTOMY      Partial     COLON SURGERY      right hemicolectomy     COLON SURGERY      COLONOSCOPY W/ POLYPECTOMY      ESOPHAGOGASTRODUODENOSCOPY      Diagnostic -  in [facility]     HERNIA REPAIR      right inguinal hernia repair     [facility] COLONOSCOPY FLX DX W/COLLJ SPEC WHEN PFRMD N/A 2016    Procedure: COLONOSCOPY;  Surgeon: Flakito Molina MD;  [facility];  Service: Gastroenterology    NH COLONOSCOPY FLX DX W/COLLJ SPEC WHEN PFRMD N/A 2019    Procedure: COLONOSCOPY;  Surgeon: Flakito Molina MD;  [facility];  Service: Gastroenterology    TONSILLECTOMY      as a child     Length Of Stay: 0        PHYSICAL THERAPY EVALUATION:    Patient's identity confirmed via 2 patient identifiers (full name and ) at start of session       25 [id number]   PT Last Visit   PT Visit Date 25   Note Type   Note type Evaluation   Pain Assessment   Pain Assessment Tool FLACC   Pain Location/Orientation Location: Abdomen   Pain Rating: FLACC (Rest) - Face 0   Pain Rating: FLACC (Rest) - Legs 0   Pain Rating: FLACC (Rest) - Activity 0   Pain Rating: FLACC (Rest) - Cry  0   Pain Rating: FLACC (Rest) - Consolability 0   Score: FLACC (Rest) 0   Pain Rating: FLACC (Activity) - Face 1   Pain Rating: FLACC (Activity) - Legs 0   Pain Rating: FLACC (Activity) - Activity 0   Pain Rating: FLACC (Activity) - Cry 1   Pain Rating: FLACC (Activity) - Consolability 0   Score: FLACC (Activity) 2   Restrictions/Precautions   Weight Bearing Precautions Per Order No   Other Precautions Chair Alarm;Bed Alarm;Multiple lines;Fall Risk;Pain  (NG tube to suction, IV lines)   Home Living   Type of Home House   Home Layout One level;Performs ADLs on one level;Able to live on main level with bedroom/bathroom;Stairs to enter with rails  (6 WALLACE)   Bathroom Shower/Tub Tub/shower unit   Bathroom Toilet Standard   Bathroom Equipment   (none)   Prior Function   Level of Crisp Independent with functional mobility;Independent with ADLs;Independent with IADLS   Lives With Spouse   Receives Help From Family   IADLs Independent with driving;Independent with medication management;Independent with meal prep   Falls in the last 6 months 0   Comments At baseline pt amb ind w/o AD   General   Additional Pertinent History Pt s/p repair of strangulated femoral hernia, small bowel resection   Family/Caregiver Present Yes  (pt's )   Cognition   Overall Cognitive Status WFL   Arousal/Participation Cooperative   Attention Within functional limits   Orientation Level Oriented X4   Memory Within functional limits   Following Commands Follows all commands and directions without difficulty   Comments Pt ID via name and ; pt agreeable to PT eval and mobility, motivated to participate   RLE Assessment   RLE Assessment WFL  (grossly assessed w/ functional mobility)   LLE Assessment   LLE Assessment WFL  (grossly assessed w/ functional mobility)   Bed Mobility   Supine to Sit 5  Supervision   Additional items Assist x 1;HOB elevated;Bedrails;Increased time required   Transfers   Sit to Stand 5  Supervision   Additional  items Assist x 1;Increased time required;Verbal cues   Stand to Sit 5  Supervision   Additional items Assist x 1;Armrests;Increased time required;Verbal cues   Ambulation/Elevation   Gait pattern Decreased foot clearance;Short stride;Excessively slow   Gait Assistance 5  Supervision   Additional items Assist x 1   Assistive Device None   Distance 4'   Ambulation/Elevation Additional Comments pt limited due to NG tube (on wall suction), nausea   Balance   Static Sitting Good   Dynamic Sitting Fair +   Static Standing Fair +   Dynamic Standing Fair   Ambulatory Fair   Activity Tolerance   Activity Tolerance Patient limited by pain  (nausea)   Medical Staff Made Aware Pt benefited from PT/OT care coordination w/ OT Yoon due to allow for challenge of pt's activity tolerance, PT and OT goals were addressed individually during session   Nurse Made Aware PAGE Ashraf pre/post   Assessment:  Monica Avitia is a 80 y.o. Female who [facility] due to abdominal pain and diagnosis of femoral hernia of R side w/ gangrene and obstruction. Orders for PT eval and treat received. Comorbidities affecting pt's functional mobility at time of evaluation include: SBO, osteopenia, pulmonary fibrosis, neuropathy, HTN. Personal factors affecting DC include: stairs to enter home. At baseline, pt mobilizes ind w/ no AD, and w/ 0 fall(s) in the previous 6 months. Upon evaluation, pt presents w/ the following deficits: decreased endurance/activity tolerance, pain affecting mobility, and gait deviations. Pt currently requires  supervision for bed mobility, supervision for transfers, supervision w/ no AD for ambulation. Pt's clinical presentation is unstable/unpredictable due to abnormal lab values, pain affecting mobility tolerance, ongoing medical management. From a PT/mobility standpoint given the above findings, DC recommendation is level: III (Minimum Rehab Resource Intensity), pending mobility progress. During current  admission, pt will benefit from continued skilled inpatient PT in the acute care setting in order to address the above deficits and to maximize function and mobility prior to DC from acute care.   Prognosis Good   Problem List Impaired balance;Decreased mobility;Pain;Decreased skin integrity   Goals   Patient Goals to spend time w/ her    STG Expiration Date 05/12/25   Short Term Goal #1 Pt will: perform bed mobility w/ mod I to decrease pt's burden of care and increase pt's independence w/ repositioning in bed; perform transfers w/ mod I to promote OOB mobility; ambulate 150' w/ LRAD and mod I to increase pt's ambulatory endurance/tolerance; negotiate 6 stair(s) w/ UE support and mod I to facilitate pt returning to previous living environment; increase all balance ratings by at least 1 grade to decrease pt's risk of falls     PT Treatment Day 0   Plan   Treatment/Interventions Functional transfer training;LE strengthening/ROM;Elevations;Therapeutic exercise;Endurance training;Equipment eval/education;Patient/family training;Bed mobility;Gait training;Compensatory technique education   PT Frequency 3-5x/wk   Discharge Recommendation   Rehab Resource Intensity Level, PT III (Minimum Resource Intensity)  (pending increased overall mobility tolerance (pt limited due to NG tube to wall suction and nausea))   AM-PAC Basic Mobility Inpatient   Turning in Flat Bed Without Bedrails 3   Lying on Back to Sitting on Edge of Flat Bed Without Bedrails 3   Moving Bed to Chair 3   Standing Up From Chair Using Arms 3   Walk in Room 3   Climb 3-5 Stairs With Railing 3   Basic Mobility Inpatient Raw Score 18   Basic Mobility Standardized Score 41.05   [facility] Highest Level Of Mobility   -HLM Goal 6: Walk 10 steps or more   -[facility] Achieved 4: Move to chair/commode   End of Consult   Patient Position at End of Consult Bedside chair;Bed/Chair alarm activated;All needs within reach  ( present in room)       The  patient's AM-PAC Basic Mobility Inpatient Short Form Raw Score is 18. A Raw score of greater than 16 suggests the patient may benefit from discharge to home. Please also refer to the recommendation of the Physical Therapist for safe discharge planning.    Pt will benefit from skilled inpatient PT during this admission in order to facilitate progress towards goals and to maximize functional independence prior to DC      DC rec: level III (Minimum Rehab Resource Intensity), pending increased overall mobility tolerance (pt limited due to NG tube to wall suction and nausea)        Calista Pham, PT, DPT  05/02/25        p

## 2025-05-02 NOTE — ANESTHESIA POSTPROCEDURE EVALUATION
Post-Op Assessment Note    CV Status:  Stable  Pain Score: 0    Pain management: adequate       Mental Status:  Arousable and sleepy   Hydration Status:  Euvolemic   PONV Controlled:  Controlled   Airway Patency:  Patent     Post Op Vitals Reviewed: Yes    No anethesia notable event occurred.    Staff: CRNA           Last Filed PACU Vitals:  Vitals Value Taken Time   Temp 97.5    Pulse 81    /81    Resp 21    SPO2 99

## 2025-05-02 NOTE — QUICK NOTE
Hyponatremia in the setting of nausea and incarcerated hernia, as evidenced by Na 129-130, requiring monitoring and NSS.

## 2025-05-02 NOTE — QUICK NOTE
Rapid response was called due to syncopal episode while having a bowel movement at bedside.  Systolic blood pressure in the low 80s, tachycardic to the low 110s.  Patient receiving saline bolus and labs were sent.  Patient upgraded to the ICU for monitoring.  Patient's  also admitted to the hospital, I personally updated him on the rapid response and our plan.  Patient expressed understanding of this plan and was thankful for the update.

## 2025-05-03 ENCOUNTER — APPOINTMENT (INPATIENT)
Dept: RADIOLOGY | Facility: HOSPITAL | Age: 80
DRG: 329 | End: 2025-05-03
Payer: COMMERCIAL

## 2025-05-03 LAB
ABO GROUP BLD BPU: NORMAL
ANION GAP SERPL CALCULATED.3IONS-SCNC: 5 MMOL/L (ref 4–13)
BASOPHILS # BLD AUTO: 0.02 THOUSANDS/ÂΜL (ref 0–0.1)
BASOPHILS NFR BLD AUTO: 0 % (ref 0–1)
BPU ID: NORMAL
BUN SERPL-MCNC: 38 MG/DL (ref 5–25)
CA-I BLD-SCNC: 1.12 MMOL/L (ref 1.12–1.32)
CALCIUM SERPL-MCNC: 8 MG/DL (ref 8.4–10.2)
CHLORIDE SERPL-SCNC: 104 MMOL/L (ref 96–108)
CO2 SERPL-SCNC: 25 MMOL/L (ref 21–32)
CREAT SERPL-MCNC: 0.75 MG/DL (ref 0.6–1.3)
CROSSMATCH: NORMAL
EOSINOPHIL # BLD AUTO: 0 THOUSAND/ÂΜL (ref 0–0.61)
EOSINOPHIL NFR BLD AUTO: 0 % (ref 0–6)
ERYTHROCYTE [DISTWIDTH] IN BLOOD BY AUTOMATED COUNT: 13.5 % (ref 11.6–15.1)
ERYTHROCYTE [DISTWIDTH] IN BLOOD BY AUTOMATED COUNT: 13.9 % (ref 11.6–15.1)
GFR SERPL CREATININE-BSD FRML MDRD: 75 ML/MIN/1.73SQ M
GLUCOSE SERPL-MCNC: 114 MG/DL (ref 65–140)
GLUCOSE SERPL-MCNC: 116 MG/DL (ref 65–140)
GLUCOSE SERPL-MCNC: 131 MG/DL (ref 65–140)
GLUCOSE SERPL-MCNC: 131 MG/DL (ref 65–140)
GLUCOSE SERPL-MCNC: 91 MG/DL (ref 65–140)
GLUCOSE SERPL-MCNC: 95 MG/DL (ref 65–140)
HCT VFR BLD AUTO: 25.7 % (ref 34.8–46.1)
HCT VFR BLD AUTO: 28.4 % (ref 34.8–46.1)
HGB BLD-MCNC: 8.3 G/DL (ref 11.5–15.4)
HGB BLD-MCNC: 8.5 G/DL (ref 11.5–15.4)
HGB BLD-MCNC: 9.3 G/DL (ref 11.5–15.4)
IMM GRANULOCYTES # BLD AUTO: 0.09 THOUSAND/UL (ref 0–0.2)
IMM GRANULOCYTES NFR BLD AUTO: 1 % (ref 0–2)
LYMPHOCYTES # BLD AUTO: 1.43 THOUSANDS/ÂΜL (ref 0.6–4.47)
LYMPHOCYTES NFR BLD AUTO: 8 % (ref 14–44)
MAGNESIUM SERPL-MCNC: 2.6 MG/DL (ref 1.9–2.7)
MCH RBC QN AUTO: 28.2 PG (ref 26.8–34.3)
MCH RBC QN AUTO: 28.3 PG (ref 26.8–34.3)
MCHC RBC AUTO-ENTMCNC: 32.7 G/DL (ref 31.4–37.4)
MCHC RBC AUTO-ENTMCNC: 33.1 G/DL (ref 31.4–37.4)
MCV RBC AUTO: 85 FL (ref 82–98)
MCV RBC AUTO: 86 FL (ref 82–98)
MONOCYTES # BLD AUTO: 1.15 THOUSAND/ÂΜL (ref 0.17–1.22)
MONOCYTES NFR BLD AUTO: 7 % (ref 4–12)
NEUTROPHILS # BLD AUTO: 14.96 THOUSANDS/ÂΜL (ref 1.85–7.62)
NEUTS SEG NFR BLD AUTO: 84 % (ref 43–75)
NRBC BLD AUTO-RTO: 0 /100 WBCS
PHOSPHATE SERPL-MCNC: 3.9 MG/DL (ref 2.3–4.1)
PLATELET # BLD AUTO: 239 THOUSANDS/UL (ref 149–390)
PLATELET # BLD AUTO: 241 THOUSANDS/UL (ref 149–390)
PMV BLD AUTO: 9.6 FL (ref 8.9–12.7)
PMV BLD AUTO: 9.7 FL (ref 8.9–12.7)
POTASSIUM SERPL-SCNC: 4.7 MMOL/L (ref 3.5–5.3)
RBC # BLD AUTO: 3.01 MILLION/UL (ref 3.81–5.12)
RBC # BLD AUTO: 3.29 MILLION/UL (ref 3.81–5.12)
SODIUM SERPL-SCNC: 134 MMOL/L (ref 135–147)
UNIT DISPENSE STATUS: NORMAL
UNIT PRODUCT CODE: NORMAL
UNIT PRODUCT VOLUME: 350 ML
UNIT RH: NORMAL
WBC # BLD AUTO: 17.65 THOUSAND/UL (ref 4.31–10.16)
WBC # BLD AUTO: 18.61 THOUSAND/UL (ref 4.31–10.16)

## 2025-05-03 PROCEDURE — 99232 SBSQ HOSP IP/OBS MODERATE 35: CPT | Performed by: SURGERY

## 2025-05-03 PROCEDURE — 84100 ASSAY OF PHOSPHORUS: CPT | Performed by: PHYSICIAN ASSISTANT

## 2025-05-03 PROCEDURE — 97116 GAIT TRAINING THERAPY: CPT

## 2025-05-03 PROCEDURE — 80048 BASIC METABOLIC PNL TOTAL CA: CPT | Performed by: PHYSICIAN ASSISTANT

## 2025-05-03 PROCEDURE — 82948 REAGENT STRIP/BLOOD GLUCOSE: CPT

## 2025-05-03 PROCEDURE — 82330 ASSAY OF CALCIUM: CPT

## 2025-05-03 PROCEDURE — 85025 COMPLETE CBC W/AUTO DIFF WBC: CPT | Performed by: PHYSICIAN ASSISTANT

## 2025-05-03 PROCEDURE — 71045 X-RAY EXAM CHEST 1 VIEW: CPT

## 2025-05-03 PROCEDURE — 85027 COMPLETE CBC AUTOMATED: CPT

## 2025-05-03 PROCEDURE — 99024 POSTOP FOLLOW-UP VISIT: CPT | Performed by: SURGERY

## 2025-05-03 PROCEDURE — 83735 ASSAY OF MAGNESIUM: CPT | Performed by: PHYSICIAN ASSISTANT

## 2025-05-03 PROCEDURE — 85018 HEMOGLOBIN: CPT | Performed by: PHYSICIAN ASSISTANT

## 2025-05-03 RX ORDER — OXYCODONE HYDROCHLORIDE 5 MG/1
5 TABLET ORAL EVERY 4 HOURS PRN
Refills: 0 | Status: DISCONTINUED | OUTPATIENT
Start: 2025-05-03 | End: 2025-05-13 | Stop reason: HOSPADM

## 2025-05-03 RX ORDER — HYDROMORPHONE HCL IN WATER/PF 6 MG/30 ML
0.2 PATIENT CONTROLLED ANALGESIA SYRINGE INTRAVENOUS EVERY 4 HOURS PRN
Refills: 0 | Status: DISCONTINUED | OUTPATIENT
Start: 2025-05-03 | End: 2025-05-04

## 2025-05-03 RX ADMIN — CHLORHEXIDINE GLUCONATE 15 ML: 1.2 SOLUTION ORAL at 21:40

## 2025-05-03 RX ADMIN — HYDROMORPHONE HYDROCHLORIDE 0.2 MG: 0.2 INJECTION, SOLUTION INTRAMUSCULAR; INTRAVENOUS; SUBCUTANEOUS at 22:09

## 2025-05-03 RX ADMIN — CEFTRIAXONE SODIUM 1000 MG: 10 INJECTION, POWDER, FOR SOLUTION INTRAVENOUS at 03:48

## 2025-05-03 RX ADMIN — PANTOPRAZOLE SODIUM 40 MG: 40 INJECTION, POWDER, LYOPHILIZED, FOR SOLUTION INTRAVENOUS at 21:39

## 2025-05-03 RX ADMIN — HYDROMORPHONE HYDROCHLORIDE 0.2 MG: 0.2 INJECTION, SOLUTION INTRAMUSCULAR; INTRAVENOUS; SUBCUTANEOUS at 01:56

## 2025-05-03 RX ADMIN — METRONIDAZOLE 500 MG: 500 INJECTION, SOLUTION INTRAVENOUS at 16:43

## 2025-05-03 RX ADMIN — CHLORHEXIDINE GLUCONATE 15 ML: 1.2 SOLUTION ORAL at 09:28

## 2025-05-03 RX ADMIN — ACETAMINOPHEN 1000 MG: 1000 INJECTION, SOLUTION INTRAVENOUS at 21:39

## 2025-05-03 RX ADMIN — ACETAMINOPHEN 1000 MG: 1000 INJECTION, SOLUTION INTRAVENOUS at 16:43

## 2025-05-03 RX ADMIN — SODIUM CHLORIDE 100 ML/HR: 0.9 INJECTION, SOLUTION INTRAVENOUS at 03:47

## 2025-05-03 RX ADMIN — METRONIDAZOLE 500 MG: 500 INJECTION, SOLUTION INTRAVENOUS at 03:48

## 2025-05-03 RX ADMIN — SODIUM CHLORIDE 500 ML: 0.9 INJECTION, SOLUTION INTRAVENOUS at 14:00

## 2025-05-03 RX ADMIN — HYDROMORPHONE HYDROCHLORIDE 0.5 MG: 1 INJECTION, SOLUTION INTRAMUSCULAR; INTRAVENOUS; SUBCUTANEOUS at 13:34

## 2025-05-03 RX ADMIN — ACETAMINOPHEN 1000 MG: 1000 INJECTION, SOLUTION INTRAVENOUS at 03:48

## 2025-05-03 RX ADMIN — OXYCODONE HYDROCHLORIDE 5 MG: 5 TABLET ORAL at 22:03

## 2025-05-03 RX ADMIN — PANTOPRAZOLE SODIUM 40 MG: 40 INJECTION, POWDER, LYOPHILIZED, FOR SOLUTION INTRAVENOUS at 09:28

## 2025-05-03 RX ADMIN — ONDANSETRON 4 MG: 2 INJECTION INTRAMUSCULAR; INTRAVENOUS at 22:14

## 2025-05-03 NOTE — ASSESSMENT & PLAN NOTE
80-year-old female with incarcerated femoral hernia containing bowel, status post open hernia repair with small bowel resection 5/2, rapid response called 5/2 for syncopal episode while having bowel movement, upgrade to ICU for close monitoring    5/2 1 unit PRBCs transfused    Now afebrile, vital signs stable within normal limits on room air    WBC 17.6  Hemoglobin 9.3 from 9.6 from 9  Creatinine 0.75    Plan  - Discontinue NG tube  - Advance to clears with diet  - Discontinue pam in right groin incision postop day 2, 5/4  - Continue IV antibiotics for 4 days postoperatively  - Repeat hemoglobin at noon  - Multimodal pain regimen  - Encourage ambulation/out of bed, 3 times daily  - Encourage incentive spirometer use, denies per hour  - Hold DVT prophylaxis 5/3 AM can consider restarting after hemoglobin recheck  - Rest of care per ICU team

## 2025-05-03 NOTE — PLAN OF CARE
Problem: PAIN - ADULT  Goal: Verbalizes/displays adequate comfort level or baseline comfort level  Description: Interventions:- Encourage patient to monitor pain and request assistance- Assess pain using appropriate pain scale- Administer analgesics based on type and severity of pain and evaluate response- Implement non-pharmacological measures as appropriate and evaluate response- Consider cultural and social influences on pain and pain management- Notify physician/advanced practitioner if interventions unsuccessful or patient reports new pain  Outcome: Progressing     Problem: INFECTION - ADULT  Goal: Absence or prevention of progression during hospitalization  Description: INTERVENTIONS:- Assess and monitor for signs and symptoms of infection- Monitor lab/diagnostic results- Monitor all insertion sites, i.e. indwelling lines, tubes, and drains- Monitor endotracheal if appropriate and nasal secretions for changes in amount and color- [place] appropriate cooling/warming therapies per order- Administer medications as ordered- Instruct and encourage patient and family to use good hand hygiene technique- Identify and instruct in appropriate isolation precautions for identified infection/condition  Outcome: Progressing  Goal: Absence of fever/infection during neutropenic period  Description: INTERVENTIONS:- Monitor WBC  Outcome: Progressing     Problem: SAFETY ADULT  Goal: Patient will remain free of falls  Description: INTERVENTIONS:- Educate patient/family on patient safety including physical limitations- Instruct patient to call for assistance with activity - Consult OT/PT to assist with strengthening/mobility - Keep Call bell within reach- Keep bed low and locked with side rails adjusted as appropriate- Keep care items and personal belongings within reach- Initiate and maintain comfort rounds- Make Fall Risk Sign visible to staff- Apply yellow socks and bracelet for high fall risk patients- Consider moving  patient to room near nurses station  Outcome: Progressing  Goal: Maintain or return to baseline ADL function  Description: INTERVENTIONS:-  Assess patient's ability to carry out ADLs; assess patient's baseline for ADL function and identify physical deficits which impact ability to perform ADLs (bathing, care of mouth/teeth, toileting, grooming, dressing, etc.)- Assess/evaluate cause of self-care deficits - Assess range of motion- Assess patient's mobility; develop plan if impaired- Assess patient's need for assistive devices and provide as appropriate- Encourage maximum independence but intervene and supervise when necessary- Involve family in performance of ADLs- Assess for home care needs following discharge - Consider OT consult to assist with ADL evaluation and planning for discharge- Provide patient education as appropriate  Outcome: Progressing  Goal: Maintains/Returns to pre admission functional level  Description: INTERVENTIONS:- Perform AM-PAC 6 Click Basic Mobility/ Daily Activity assessment daily.- Set and communicate daily mobility goal to care team and patient/family/caregiver. - Collaborate with rehabilitation services on mobility goals if consulted- Out of bed for toileting- Record patient progress and toleration of activity level   Outcome: Progressing     Problem: DISCHARGE PLANNING  Goal: Discharge to home or other facility with appropriate resources  Description: INTERVENTIONS:- Identify barriers to discharge w/patient and caregiver- Arrange for needed discharge resources and transportation as appropriate- Identify discharge learning needs (meds, wound care, etc.)- Arrange for interpretive services to assist at discharge as needed- Refer to Case Management Department for coordinating discharge planning if the patient needs post-hospital services based on physician/advanced practitioner order or complex needs related to functional status, cognitive ability, or social support system  Outcome:  Progressing     Problem: Knowledge Deficit  Goal: Patient/family/caregiver demonstrates understanding of disease process, treatment plan, medications, and discharge instructions  Description: Complete learning assessment and assess knowledge base.Interventions:- Provide teaching at level of understanding- Provide teaching via preferred learning methods  Outcome: Progressing     Problem: Prexisting or High Potential for Compromised Skin Integrity  Goal: Skin integrity is maintained or improved  Description: INTERVENTIONS:- Identify patients at risk for skin breakdown- Assess and monitor skin integrity- Assess and monitor nutrition and hydration status- Monitor labs - Assess for incontinence - Turn and reposition patient- Assist with mobility/ambulation- Relieve pressure over bony prominences- Avoid friction and shearing- Provide appropriate hygiene as needed including keeping skin clean and dry- Evaluate need for skin moisturizer/barrier cream- Collaborate with interdisciplinary team - Patient/family teaching- Consider wound care consult   Outcome: Progressing

## 2025-05-03 NOTE — ASSESSMENT & PLAN NOTE
-Patient presented with syncopal episode following bowel movement and was found to be hypotensive with SBP 90s  -BP responsive to fluid resuscitation  -Patient was seen to have melenotic stool and some blood in NGT output  -Repeat H&Hs, electrolytes  -Continue surveillance for GI bleed  -Had additional hypotension shortly after arriving to ICU and was transfused 1 unit pRBCs  -Hemoglobin today stable, MAP>65 overnight

## 2025-05-03 NOTE — CONSULTS
Consultation - Critical Care/ICU   Name: Monica Avitia 80 y.o. female I MRN: [id number]  Unit/Bed#: ICU 02 I Date of Admission: 5/1/2025   Date of Service: 5/2/2025 I Hospital Day: 0   Consults  Physician Requesting Evaluation: Dain Kinney,*   Reason for Evaluation / Principal Problem: Small Bowel obstruction          Assessment & Plan  Femoral hernia of right side with gangrene and obstruction  -Patient presented with abdominal pain, nausea, and vomiting  -Was found to have CT A/P with SBO secondary to strangulated right groin hernia  -Went to OR 5/2 Ex-lap with repair of strangulated femoral hernia and small bowel resection   -NPO with NGT in place  -Continue ceftriaxone and metronidazole  -Serial abdominal exams  -PT/OT evaluation  Hypotension  -Patient presented with syncopal episode following bowel movement and was found to be hypotensive with SBP 90s  -BP responsive to fluid resuscitation  -Patient was seen to have melenotic stool and some blood in NGT output  -Repeat H&Hs, electrolytes  -Continue surveillance for GI bleed  Diverticulosis  -Continue monitoring  Other hyperlipidemia  -Resume home atorvastatin when able to   Essential hypertension  -Holding amlodipine home med in the face of hypotension  Acquired hypothyroidism  -Resume levothyroxine when able to  Pulmonary fibrosis, unspecified (HCC)  -Currently without oxygen requirement, continue monitoring  SBO (small bowel obstruction) (HCC)  -See femoral hernia  Syncope  -See hypotension  Disposition: Stepdown Level 1    History of Present Illness   Monica Avitia is a 80 y.o. w/ hx of right hemicolectomy due to colon cancer who presented to hospital for abdominal pain, nausea, and vomiting that started in the morning. She denies previous similar episodes. CT was performed which revealed SBO secondary to entrapment of bowel loop within right groin hernia. Patient went to OR for exlap 5/2 with repair of strangulated femoral hernia  with small bowel resection. Patient tolerated procedure well but later in the day rapid response was called due to the patient having a short episode of syncope right after having a bowel movement. At the time she was noted to be hypotensive which was initially responsive to 500cc fluids. Patient was also found to have some scant blood in NGT output as well as melanotic stool prior to the episode. On my initial evaluation, patient reported only mild diffuse abdominal pain that was described as crampy with nausea and one episode of vomiting.    5/2 OR for Ex Lap: repair of strangulated femoral hernia, small bowel resection  5/2 RRT for syncopal event while on the commode, hypoentsive to 70's systolic, resolved with 500cc bolus LR, tx to SD1 - 1 unit pRBC    History obtained from chart review and the patient.      Historical Information   Past Medical History:  06/12/2014: Anemia      Comment:  Transfused  3  units  of  blood  No date: Anxiety      Comment:  resolved: Oct 23, 2017  No date: Cancer (HCC)  No date: Colon cancer (HCC)  No date: Disease of thyroid gland      Comment:  hypothyroidism  No date: GERD (gastroesophageal reflux disease)  No date: History of Helicobacter infection      Comment:  onset: 6/14  No date: Hypertension  No date: Obesity Past Surgical History:  No date: BREAST SURGERY  No date: COLECTOMY      Comment:  Partial   No date: COLON SURGERY      Comment:  right hemicolectomy 2014  No date: COLON SURGERY  No date: COLONOSCOPY W/ POLYPECTOMY  No date: ESOPHAGOGASTRODUODENOSCOPY      Comment:  Diagnostic - 6/14 in \[facility] [place] [facility]\""   No date: HERNIA REPAIR      Comment:  right inguinal hernia repair 1989 01/11/2016: MA COLONOSCOPY FLX DX W/COLLJ SPEC WHEN PFRMD; N/A      Comment:  Procedure: COLONOSCOPY;  Surgeon: Flakito Molina MD;                 Location: [facility];  Service: Gastroenterology  03/11/2019: MA COLONOSCOPY FLX DX W/COLLJ SPEC WHEN PFRMD; N/A      Comment:  Procedure: COLONOSCOPY;  Surgeon:  Flakito Molina MD;                 Location: [facility];  Service: Gastroenterology  No date: TONSILLECTOMY      Comment:  as a child   Current Outpatient Medications   Medication Instructions    amLODIPine (NORVASC) 2.5 mg, Oral, Every morning    atorvastatin (LIPITOR) 10 mg, Oral, Daily    Calcium Carb-Cholecalciferol 1000-800 MG-UNIT TABS 1 tablet, Daily    Garlic 1,000 mg, Daily    hydroCHLOROthiazide 25 mg, Oral, Every morning    levothyroxine 75 mcg, Oral, Every morning    Loratadine 10 mg, Oral, Daily    Multiple Vitamins-Minerals (MULTIVITAMIN WITH MINERALS) tablet 1 tablet, Daily    No Known Allergies   Social History     Tobacco Use    Smoking status: Never    Smokeless tobacco: Never   Vaping Use    Vaping status: Never Used   Substance Use Topics    Alcohol use: No    Drug use: No    Family History   Problem Relation Age of Onset    Hypertension Mother     Alcohol abuse Father     Alzheimer's disease Father     Hyperlipidemia Sister         3 living sisters     No Known Problems Brother     No Known Problems Maternal Grandmother     No Known Problems Maternal Grandfather     No Known Problems Paternal Grandmother     No Known Problems Paternal Grandfather           Objective :                   Vitals I/O      Most Recent Min/Max in 24hrs   Temp 98.2 °F (36.8 °C) Temp  Min: 97 °F (36.1 °C)  Max: 98.8 °F (37.1 °C)   Pulse 68 Pulse  Min: 65  Max: 120   Resp 18 Resp  Min: 12  Max: 20   /56 BP  Min: 75/44  Max: 186/81   O2 Sat 100 % SpO2  Min: 93 %  Max: 100 %      Intake/Output Summary (Last 24 hours) at 5/2/2025 2157  Last data filed at 5/2/2025 2145  Gross per 24 hour   Intake 1750 ml   Output 680 ml   Net 1070 ml       Diet NPO    Invasive Monitoring           Physical Exam   Physical Exam  Vitals and nursing note reviewed.   Eyes:      Extraocular Movements: Extraocular movements intact.      Conjunctiva/sclera: Conjunctivae normal.      Pupils: Pupils are equal, round, and reactive to light.    Skin:     General: Skin is warm and dry.      Capillary Refill: Capillary refill takes less than 2 seconds.   HENT:      Head: Normocephalic and atraumatic.      Mouth/Throat:      Mouth: Mucous membranes are moist.   Cardiovascular:      Rate and Rhythm: Normal rate and regular rhythm.      Pulses: Normal pulses.   Abdominal: General: Bowel sounds are normal.      Palpations: Abdomen is soft.      Tenderness: There is no abdominal tenderness.      Comments: RLQ incision site with scant blood, intact.   Constitutional:       General: She is not in acute distress.  Pulmonary:      Effort: Pulmonary effort is normal.      Breath sounds: Normal breath sounds.   Neurological:      General: No focal deficit present.      Mental Status: She is alert.      Motor: Strength full and intact in all extremities.          Diagnostic Studies        Lab Results: I have reviewed the following results:     Medications:  Scheduled PRN   acetaminophen, 1,000 mg, Q6H ALEXANDRA  cefTRIAXone, 1,000 mg, Q24H  chlorhexidine, 15 mL, Q12H ALEXANDRA  enoxaparin, 40 mg, Q24H ALEXANDRA  lactated ringers, 500 mL, Once  metroNIDAZOLE, 500 mg, Q12H  pantoprazole, 40 mg, Q12H ALEXANDRA  potassium chloride, 20 mEq, TID      HYDROmorphone, 0.5 mg, Q4H PRN  HYDROmorphone, 0.2 mg, Q4H PRN  labetalol, 10 mg, Q6H PRN  ondansetron, 4 mg, Q6H PRN  phenol, 1 spray, Q2H PRN       Continuous    sodium chloride, 100 mL/hr, Last Rate: 100 mL/hr (05/02/25 0812)         Labs:   CBC    Recent Labs     05/02/25 0514 05/02/25 1951   WBC 16.79* 20.32*   HGB 13.8 9.0*   HCT 42.3 27.5*    263   BANDSPCT  --  1     BMP    Recent Labs     05/02/25 0514 05/02/25 1951   SODIUM 130* 132*   K 3.4* 3.8   CL 92* 98   CO2 28 25   AGAP 10 9   BUN 10 26*   CREATININE 0.59* 0.89   CALCIUM 8.6 7.8*       Coags    Recent Labs     05/01/25  2307   INR 0.97   PTT 25        Additional Electrolytes  Recent Labs     05/02/25 0514 05/02/25 1951   MG 1.6* 2.9*   PHOS 2.9 4.3*   CAIONIZED  --  1.05*           Blood Gas    No recent results  Recent Labs     05/02/25 1954   PHVEN 7.345   ZZH3LWA 43.1   PO2VEN 23.3*   [id number] 23.0*   BEVEN -2.6   [id number] 32.4*    LFTs  Recent Labs     05/01/25 2142 05/02/25 1951   ALT 12 10   AST 23 15   ALKPHOS 51 33*   ALB 4.5 3.1*   TBILI 0.74 0.63       Infectious  Recent Labs     05/01/25 2142   PROCALCITONI <0.05     Glucose  Recent Labs     05/01/25 2142 05/02/25 0514 05/02/25 1951   GLUC 108 157* 167*

## 2025-05-03 NOTE — UTILIZATION REVIEW
Initial Clinical Review    Admission: Date/Time/Statement:   Admission Orders (From admission, onward)       Ordered        05/02/25 0115  Inpatient Admission  Once                          Orders Placed This Encounter   Procedures    Inpatient Admission     Standing Status:   Standing     Number of Occurrences:   1     Level of Care:   Level 2 Stepdown / HOT [14]     Estimated length of stay:   More than 2 Midnights     Certification:   I certify that inpatient services are medically necessary for this patient for a duration of greater than two midnights. See H&P and MD Progress Notes for additional information about the patient's course of treatment.     ED Arrival Information       Expected   -    Arrival   5/1/2025 20:26    Acuity   Emergent              Means of arrival   Wheelchair    Escorted by   Self    Service   Critical Care/ICU    Admission type   Emergency              Arrival complaint   Abd Pain / Vomiting             Chief Complaint   Patient presents with    Abdominal Pain     Pt reports was with her  who is admitted upstairs ate the turkey dinner in his room with him and started with RLQ abd pain radiating to R groin with nausea immediatly after at 1700. Denies urinary complaints/Cp/SOB.        Initial Presentation: 80 y.o. female  with hx  HTN, RIHR in 1989, right hemicolectomy for colon cancer, hypothyroidism  who presents to ED 5/1 with one day of nausea, vomiting, abdominal pain. Last bowel movement yesterday . On exam, BP elevated , abdomen soft, tender in her right groin, no overlying skin changes, palpable right groin mass .  Attempted bedside reduction of groin hernia which was not successful.    Labs WBC of 15.6, Hgb 14, Cr 0.68  . CT AP was obtained which showed a small bowel obstruction secondary to entrapment of the bowel loop within a right groin hernia. Pt given IV analgesics, Iv antiemetics, IVF in ED . Admitted as Inpatient to level 2 SD 5/2 by general sx with SBO 2/2  incarcerated right inguinal hernia. Plan- NPO. NGT. OR for right inguinal hernia repair, possible exploratory laparotomy, possible SBR, . Pain control. PRN antiemetics . OOB ambulation . DVT ppx - Lovenox .     5/2/25 @ 0212   Right - REPAIR STRANGULATED FEMORAL HERNIA. SMALL BOWEL RESECTION    General anesthesia   Operative Findings:  Strangulated right femoral hernia with small area of necrotic small bowel. SBR performed with side-to-side functional end-to-end anastomosis.    5/2  Upgraded to level 1 SD -  Hyponatremia Na 129-130, requiring monitoring and NSS IVF .Pt started on IV abx :Ceftriaxone, flagyl post op .  Rapid response was called due to syncopal episode while having a bowel movement at bedside. Systolic blood pressure in the low 80s, tachycardic to the low 110s. Patient receiving saline bolus and labs were sent. Patient upgraded to the ICU for monitoring.  .   Critical care  Acute blood loss anemia . Hypovolemic shock concerning for ongoing hemorrhage possibly anastomotic versus upper or lower GI hemorrhage. GCS 15, acute pain noted .Multimodal analgesia to continue.  Shortly after coming to ICU following rapid response,  patient had an episode of syncope lasting <10 seconds, subsequently felt dizzy, and was noted to have multiple BP readings with MAP 50-low 60s.. Given drop in hemoglobin to 9 from 14 on admit  and bloody stool x 1 and some blood in NGT output , 1 unit PRBCs ordered and administered rapidly. Patient's hemodynamics improved at that time. NG tube was irrigated without any bright red blood. Thromboelastogram obtained showing no coagulopathy. Continue to monitor .  Repeat hemoglobin in morning. N.p.o. with NG tube at this time. Monitor lytes. Continue IV abx . WBC 20.32   Update :  Normalization of BP and she stated that she no longer felt dizzy. Repeat H&H scheduled for midnight. Started IV PPI BID .      Date: 5/3POD #1   Day 3: Has surpassed a 2nd midnight with active treatments and  services.  Incision w/ scant blood, intact ,  pam in place, no erythema, minimally tender .  Pt receiving prn IV Dilaudid for pain .   Passing flatus this morning   Pt NPO .  NGT in place . Will do NG tube clamp trial. . Hgb 9.3 this am s/p 1 U PRBC yesterday .Hold DVT prophylaxis 5/3 AM can consider restarting after hemoglobin recheck  .   MAP>65 overnight  . WBC 17.65- IV abx continue x4 days post op .   Na 134 today with IVF- NSS infusing .   PT- level 2, moderate, rehab resource intensity .     ED Treatment-Medication Administration from 05/01/2025 2026 to 05/02/2025 0143         Date/Time Order Dose Route Action     05/01/2025 2153 acetaminophen (Ofirmev) injection 1,000 mg 1,000 mg Intravenous New Bag     05/01/2025 2150 ondansetron (ZOFRAN) injection 4 mg 4 mg Intravenous Given     05/01/2025 2238 iohexol (OMNIPAQUE) 350 MG/ML injection (MULTI-DOSE) 100 mL 85 mL Intravenous Given     05/02/2025 0002 morphine injection 2 mg 2 mg Intravenous Given     05/02/2025 0046 sodium chloride 0.9 % bolus 1,000 mL 1,000 mL Intravenous New Bag     05/02/2025 0120 ondansetron (ZOFRAN) injection 4 mg 4 mg Intravenous Given            Scheduled Medications:  acetaminophen, 1,000 mg, Intravenous, Q6H ALEXANDRA  cefTRIAXone, 1,000 mg, Intravenous, Q24H  chlorhexidine, 15 mL, Mouth/Throat, Q12H ALEXANDRA  [Held by provider] enoxaparin, 40 mg, Subcutaneous, Q24H ALEXANDRA  metroNIDAZOLE, 500 mg, Intravenous, Q12H  pantoprazole, 40 mg, Intravenous, Q12H ALEXANDRA  sodium chloride, 500 mL, Intravenous, Once x1 5/3 @ 1400    calcium gluconate 2 g in sodium chloride 0.9% 100 mL (premix)  Dose: 2 g  Freq: Once Route: IV  Last Dose: 2 g (05/02/25 2223)  Start: 05/02/25 2215 End: 05/02/25 2323  magnesium sulfate 4 g/100 mL IVPB (premix) 4 g  Dose: 4 g  Freq: Once Route: IV  Last Dose: 4 g (05/02/25 0813)  Start: 05/02/25 0800 End: 05/02/25 1213  potassium chloride 20 mEq IVPB (premix)  Dose: 20 mEq  Freq: 3 times daily Route: IV  Last Dose: 20 mEq (05/02/25  2039)  Start: 05/02/25 0900 End: 05/02/25 2239  pantoprazole (PROTONIX) injection 40 mg  Dose: 40 mg  Freq: Once Route: IV  Start: 05/02/25 2030 End: 05/02/25 2032      Continuous IV Infusions:  sodium chloride, 75 mL/hr, Intravenous, Continuous      PRN Meds:  HYDROmorphone, 0.2 mg, Intravenous, Q4H PRN x1 5/3   labetalol, 10 mg, Intravenous, Q6H PRN  ondansetron, 4 mg, Intravenous, Q6H PRN x2 5/2, x1 5/3   oxyCODONE, 5 mg, Oral, Q4H PRN x1 5/3  oxyCODONE, 2.5 mg, Oral, Q4H PRN    HYDROmorphone (DILAUDID) injection 0.5 mg  Dose: 0.5 mg  Freq: Every 4 hours PRN Route: IV  PRN Reasons: severe pain,breakthrough pain  Start: 05/02/25 0115 End: 05/03/25 1607 x1 5/2, x1 5/3   HYDROmorphone HCl (DILAUDID) injection 0.2 mg  Dose: 0.2 mg  Freq: Every 4 hours PRN Route: IV  PRN Reason: moderate pain  Start: 05/02/25 0115 End: 05/03/25 1607 x1 5/2, x1 5/3     ED Triage Vitals   Temperature Pulse Respirations Blood Pressure SpO2 Pain Score   05/01/25 2049 05/01/25 2043 05/01/25 2043 05/01/25 2043 05/01/25 2043 05/01/25 2041   97.7 °F (36.5 °C) 75 20 (!) 192/93 98 % 10 - Worst Possible Pain     Weight (last 2 days)       None            Vital Signs (last 3 days)       Date/Time Temp Pulse Resp BP MAP (mmHg) SpO2 O2 Device Cardiac (WDL) Patient Position - Orthostatic VS [place] Coma Scale Score Pain    05/03/25 1538 98.4 °F (36.9 °C) -- -- -- -- -- -- -- -- -- --    05/03/25 1115 98.5 °F (36.9 °C) 83 15 120/55 80 96 % -- -- Lying -- --    05/03/25 0615 -- 84 20 108/55 76 97 % -- -- -- -- --    05/03/25 0600 -- 73 13 109/58 81 98 % -- -- -- -- --    05/03/25 0545 -- 72 15 117/55 79 98 % -- -- -- -- --    05/03/25 0530 -- 77 14 105/56 77 98 % -- -- -- -- --    05/03/25 0515 -- 74 13 94/53 71 97 % -- -- -- -- --    05/03/25 0500 -- 72 13 111/58 77 100 % -- -- -- -- --    05/03/25 0445 -- 73 13 111/56 78 100 % -- -- -- -- --    05/03/25 0430 -- 74 10 127/60 86 98 % -- -- -- -- --    05/03/25 0415 -- 73 9 115/56 80 100 % -- -- -- --  --    05/03/25 0400 -- 81 13 117/61 84 97 % -- -- -- 15 No Pain    05/03/25 0345 -- 75 13 118/55 80 96 % -- -- -- -- --    05/03/25 0330 -- 75 14 120/58 82 98 % -- -- -- -- --    05/03/25 0315 -- 74 13 110/57 77 99 % -- -- -- -- --    05/03/25 0300 -- 72 12 110/56 81 100 % -- -- -- -- --    05/03/25 0245 -- 72 14 108/57 76 97 % -- -- -- -- --    05/03/25 0230 -- 72 12 107/55 75 96 % -- -- -- -- --    05/03/25 0200 -- 88 19 97/59 72 97 % -- -- -- -- --    05/03/25 0156 -- -- -- -- -- -- -- -- -- -- 6    05/03/25 0155 -- 107 33 82/54 63 98 % -- -- -- -- --    05/03/25 0145 -- 82 16 129/62 89 96 % -- -- -- -- --    05/03/25 0130 -- 93 14 135/60 87 98 % -- -- -- -- --    05/03/25 0115 -- 82 14 128/59 87 98 % -- -- -- -- --    05/03/25 0100 -- 74 13 127/64 89 98 % -- -- -- -- --    05/03/25 0045 -- 76 13 128/63 89 99 % -- -- -- -- --    05/03/25 0030 -- 75 13 132/64 90 96 % -- -- -- -- --    05/03/25 0015 -- 77 14 128/63 89 96 % -- -- -- -- --    05/03/25 0000 -- 83 19 123/58 83 98 % -- -- -- 15 No Pain    05/02/25 2345 -- 70 28 135/65 93 98 % -- -- -- -- --    05/02/25 2330 -- 67 27 127/60 86 98 % -- -- -- -- --    05/02/25 2315 -- 67 26 111/57 80 99 % -- -- -- -- --    05/02/25 2300 98.3 °F (36.8 °C) 68 26 126/63 88 99 % -- -- -- -- --    05/02/25 2245 -- 67 24 129/60 86 100 % -- -- -- -- --    05/02/25 2230 -- 68 24 106/57 78 98 % -- -- -- -- --    05/02/25 2215 -- 66 17 121/56 80 100 % -- -- -- -- --    05/02/25 2145 98.2 °F (36.8 °C) 68 18 111/56 81 100 % -- -- -- -- --    05/02/25 2140 98 °F (36.7 °C) 67 16 116/63 -- 100 % -- -- -- -- --    05/02/25 2135 -- 67 12 122/60 -- 99 % -- -- -- -- --    05/02/25 2130 -- 68 13 110/55 77 100 % -- -- -- -- --    05/02/25 2125 -- 71 17 112/56 -- 100 % -- -- -- -- --    05/02/25 2120 98.5 °F (36.9 °C) 74 18 107/58 -- 100 % -- -- -- -- --    05/02/25 2115 98.4 °F (36.9 °C) 76 20 110/58 81 100 % -- -- -- -- --    05/02/25 2100 -- 69 13 97/55 73 98 % -- -- -- -- --    05/02/25  2045 -- 95 27 78/50 60 98 % -- -- -- -- --    05/02/25 2015 -- 78 18 92/51 67 94 % -- -- -- -- --    05/02/25 2000 -- 79 20 91/55 69 97 % -- -- -- 15 No Pain    05/1945 -- 73 18 104/56 76 100 % -- -- -- -- --    05/02/25 1923 -- 77 -- 103/55 71 93 % -- -- -- -- --    05/02/25 19:21:28 -- 86 -- -- -- 93 % -- -- -- -- --    05/02/25 19:20:07 -- 79 -- 85/51 62 95 % -- -- -- -- --    05/02/25 19:18:28 -- 113 -- -- -- 96 % -- -- -- -- --    05/02/25 19:16:57 -- 114 -- 75/44 54 96 % -- -- -- -- --    05/02/25 19:15:28 -- 111 -- -- -- 95 % -- -- -- -- --    05/02/25 19:13:48 -- -- -- 82/48 -- -- -- -- -- -- --    05/02/25 19:12:28 -- 120 -- -- -- 95 % -- -- -- -- --    05/02/25 1908 -- -- -- 94/52 -- -- -- -- -- -- --    05/02/25 19:02:31 98.5 °F (36.9 °C) 80 -- 95/51 66 94 % -- -- -- -- --    05/02/25 15:16:41 97 °F (36.1 °C) 82 14 108/78 88 95 % -- -- -- -- --    05/02/25 1034 -- -- -- -- -- -- -- -- -- -- 10 - Worst Possible Pain    05/02/25 08:42:19 98.8 °F (37.1 °C) 78 16 117/62 80 100 % -- -- -- -- --    05/02/25 0800 -- -- -- -- -- -- -- -- -- 15 8    05/02/25 0603 -- -- -- -- -- -- -- -- -- -- 10 - Worst Possible Pain    05/02/25 04:14:27 97.3 °F (36.3 °C) 89 15 162/85 111 94 % -- -- -- -- --    05/02/25 0400 97.4 °F (36.3 °C) 76 16 165/79 116 98 % None (Room air) WDL -- 15 10 - Worst Possible Pain    05/02/25 0345 97.5 °F (36.4 °C) 82 15 180/84 121 98 % None (Room air) WDL -- 15 No Pain    05/02/25 0330 97.5 °F (36.4 °C) 84 16 186/81 117 99 % None (Room air) WDL -- 15 No Pain    05/02/25 0130 -- -- -- 178/81 116 -- -- -- -- -- --    05/02/25 0100 -- 73 18 168/80 115 98 % None (Room air) -- Lying -- --    05/02/25 0045 -- 66 18 97/55 70 94 % None (Room air) -- Lying -- --    05/02/25 0030 -- 65 16 75/43 55 95 % None (Room air) -- Lying -- --    05/02/25 0002 -- -- -- -- -- -- -- -- -- -- 7    05/01/25 2315 -- 84 -- 180/119 131 97 % -- -- -- -- --    05/01/25 2208 -- -- -- -- -- -- None (Room air) -- -- -- --     05/01/25 2200 -- 69 -- 185/88  126 97 % -- -- -- -- --    05/01/25 2145 -- 77 -- 191/86  124 98 % -- -- -- -- --    05/01/25 2120 -- -- -- -- -- -- -- -- -- 15 --    05/01/25 2115 -- 74 -- 178/96 131 96 % -- -- -- -- --    05/01/25 2100 -- 77 -- 177/96 130 98 % -- -- -- -- --    05/01/25 2049 97.7 °F (36.5 °C) -- -- -- -- -- -- -- -- -- --    05/01/25 2043 -- 75 20 192/93 133 98 % None (Room air) -- Lying -- 10 - Worst Possible Pain    05/01/25 2041 -- -- -- -- -- -- -- -- -- -- 10 - Worst Possible Pain              Pertinent Labs/Diagnostic Test Results:   Radiology:   CXR 5/3- Low lung volumes with vascular crowding. No definite acute disease.     XR chest portable ICU   Final Interpretation by Maria Isabel Piper MD (05/03 0733)      No acute cardiopulmonary disease.      NG tube in stomach.            Workstation performed: [id number]         XR abdomen 1 vw portable   Final Interpretation by Herber Elizabeth MD (05/02 0901)      Nasogastric tube sideport in the stomach with tip near the gastric outlet/antrum               Workstation performed: [id number]         XR chest portable   Final Interpretation by Tariq Gonzales DO (05/02 0940)      Enteric tube courses to the stomach as above.      No acute cardiopulmonary disease.            Resident: Jamal Pritchett I, the attending radiologist, have reviewed the images and agree with the final report above.      Workstation performed: [id number]         CT abdomen pelvis with contrast   Final Interpretation by Michael Conway MD (05/01 3488)      Findings consistent with small bowel obstruction secondary to entrapment of the bowel loop within a right groin hernia. Recommend surgical consultation.         I personally discussed this study with RADHA HARP on 5/1/2025 11:46 PM.            Workstation performed: [id number]           Cardiology:  No orders to display     GI:  No orders to display           Results from last 7 days   Lab Units  "05/03/25  1402 05/03/25  0400 05/02/25  2314 05/02/25 1951 05/02/25 0514 05/01/25  2142   WBC Thousand/uL 18.61* 17.65*  --  20.32* 16.79* 15.64*   HEMOGLOBIN g/dL 8.5* 9.3* 9.6* 9.0* 13.8 14.0   HEMATOCRIT % 25.7* 28.4* 29.7* 27.5* 42.3 42.2   PLATELETS Thousands/uL 239 241  --  263 308 299   TOTAL NEUT ABS Thousands/µL  --  14.96*  --   --   --  12.93*   BANDS PCT %  --   --   --  1  --   --          Results from last 7 days   Lab Units 05/03/25 0400 05/02/25 1951 05/02/25 0514 05/01/25 2142   SODIUM mmol/L 134* 132* 130* 129*   POTASSIUM mmol/L 4.7 3.8 3.4* 3.2*   CHLORIDE mmol/L 104 98 92* 89*   CO2 mmol/L 25 25 28 29   ANION GAP mmol/L 5 9 10 11   BUN mg/dL 38* 26* 10 12   CREATININE mg/dL 0.75 0.89 0.59* 0.68   EGFR ml/min/1.73sq m 75 61 86 82   CALCIUM mg/dL 8.0* 7.8* 8.6 9.6   CALCIUM, IONIZED mmol/L 1.12 1.05*  --   --    MAGNESIUM mg/dL 2.6 2.9* 1.6*  --    PHOSPHORUS mg/dL 3.9 4.3* 2.9  --      Results from last 7 days   Lab Units 05/02/25 1951 05/01/25  2142   AST U/L 15 23   ALT U/L 10 12   ALK PHOS U/L 33* 51   TOTAL PROTEIN g/dL 5.5* 7.9   ALBUMIN g/dL 3.1* 4.5   TOTAL BILIRUBIN mg/dL 0.63 0.74     Results from last 7 days   Lab Units 05/03/25  1116 05/03/25  0618 05/02/25  2359 05/02/25  1913 05/02/25  1754 05/02/25  0559   POC GLUCOSE mg/dl 95 116 114 152* 147* 154*     Results from last 7 days   Lab Units 05/03/25  0400 05/02/25 1951 05/02/25 0514 05/01/25 2142   GLUCOSE RANDOM mg/dL 131 167* 157* 108             No results found for: \"BETA-HYDROXYBUTYRATE\"       Results from last 7 days   Lab Units 05/02/25 1954   PH CLIVE  7.345   PCO2 CLIVE mm Hg 43.1   PO2 CLIVE mm Hg 23.3*   HCO3 CLIVE mmol/L 23.0*   BASE EXC CLIVE mmol/L -2.6   O2 CONTENT CLIVE ml/dL 4.1   O2 HGB, VENOUS % 32.4*             Results from last 7 days   Lab Units 05/02/25  2314 05/02/25 2125 05/02/25 1951   HS TNI 0HR ng/L  --   --  10   HS TNI 2HR ng/L  --  9  --    HSTNI D2 ng/L  --  -1  --    HS TNI 4HR ng/L 10  --   --  "   HSTNI D4 ng/L 0  --   --          Results from last 7 days   Lab Units 05/01/25  2307   PROTIME seconds 13.6   INR  0.97   PTT seconds 25         Results from last 7 days   Lab Units 05/01/25  2142   PROCALCITONIN ng/ml <0.05     Results from last 7 days   Lab Units 05/02/25  1951 05/01/25  2307   LACTIC ACID mmol/L 2.0 1.1                         Results from last 7 days   Lab Units 05/03/25  0547   UNIT PRODUCT CODE  [id number]   UNIT NUMBER  [id number]   UNITABO  A   UNITRH  NEG   CROSSMATCH  Compatible   UNIT DISPENSE STATUS  Presumed Trans   UNIT PRODUCT VOL ml 350     Results from last 7 days   Lab Units 05/02/25  0306   HEP B S AG  Non-reactive   HEP C AB  Non-reactive     Results from last 7 days   Lab Units 05/01/25  2142   LIPASE u/L 17                 Results from last 7 days   Lab Units 05/01/25  2350   CLARITY UA  Clear   COLOR UA  Colorless   SPEC GRAV UA  >=1.050*   PH UA  7.5   GLUCOSE UA mg/dl Negative   KETONES UA mg/dl 20 (1+)*   BLOOD UA  Trace*   PROTEIN UA mg/dl Trace*   NITRITE UA  Negative   BILIRUBIN UA  Negative   UROBILINOGEN UA (BE) mg/dl <2.0   LEUKOCYTES UA  Negative   WBC UA /hpf None Seen   RBC UA /hpf 1-2   BACTERIA UA /hpf None Seen   EPITHELIAL CELLS WET PREP /hpf Occasional                                 Results from last 7 days   Lab Units 05/01/25  2346 05/01/25  2307   BLOOD CULTURE  No Growth at 24 hrs. No Growth at 24 hrs.                   Past Medical History:   Diagnosis Date    Anemia 06/12/2014    Transfused  3  units  of  blood    Anxiety     resolved: Oct 23, 2017    Cancer (HCC)     Colon cancer (HCC)     Disease of thyroid gland     hypothyroidism    GERD (gastroesophageal reflux disease)     History of Helicobacter infection     onset: 6/14    Hypertension     Obesity      Present on Admission:   Femoral hernia of right side with gangrene and obstruction   SBO (small bowel obstruction) (HCC)   Diverticulosis   Other hyperlipidemia   Essential hypertension    Acquired hypothyroidism   Pulmonary fibrosis, unspecified (HCC)      Admitting Diagnosis: Hypokalemia [E87.6]  Hyponatremia [E87.1]  SBO (small bowel obstruction) (HCC) [K56.609]  Right inguinal hernia [K40.90]  Incarcerated inguinal hernia [K40.30]  Age/Sex: 80 y.o. female    Network Utilization Review Department  ATTENTION: Please call with any questions or concerns to [phone] and carefully listen to the prompts so that you are directed to the right person. All voicemails are confidential.   For Discharge needs, contact Care Management DC Support Team at [phone] opt. 2  Send all requests for admission clinical reviews, approved or denied determinations and any other requests to dedicated fax number below belonging to the campus where the patient is receiving treatment. List of dedicated fax numbers for the Facilities:  FACILITY NAME UR FAX NUMBER   ADMISSION DENIALS (Administrative/Medical Necessity) [phone]   DISCHARGE SUPPORT TEAM (NETWORK) [phone]   PARENT CHILD HEALTH (Maternity/NICU/Pediatrics) [phone]   [facility] [phone]   [facility] [phone]   [facility] [phone]   [facility] [phone]   [facility] [phone]   [facility] [phone]   [facility] [phone]   [facility] [phone]   [facility] [phone]   [facility], [facility] [phone]   [facility] [phone]   [facility] [phone]

## 2025-05-03 NOTE — ASSESSMENT & PLAN NOTE
-See hypotension  -Had additional episode shortly after arrival to ICU and was transfused 1 unit pRBCs

## 2025-05-03 NOTE — SEPSIS NOTE
Sepsis Note   Monica Avitia 80 y.o. female MRN: [id number]  Unit/Bed#: ICU 02 Encounter: [id number]       Initial Sepsis Screening       Row Name 05/02/25 2034                Is the patient's history suggestive of a new or worsening infection? No  -                  User Key  (r) = Recorded By, (t) = Taken By, (c) = Cosigned By      Initials Name Provider Type     Philomena Maradiaga PA-C Physician Assistant                        There is no height or weight on file to calculate BMI.  Wt Readings from Last 1 Encounters:   04/16/25 64.6 kg (142 lb 6.4 oz)        Ideal body weight: 48.8 kg (107 lb 8.4 oz)  Adjusted ideal body weight: 55.1 kg (121 lb 7.6 oz)

## 2025-05-03 NOTE — PLAN OF CARE
Problem: PHYSICAL THERAPY ADULT  Goal: Performs mobility at highest level of function for planned discharge setting.  See evaluation for individualized goals.  Description: Treatment/Interventions: Functional transfer training, LE strengthening/ROM, Elevations, Therapeutic exercise, Endurance training, Equipment eval/education, Patient/family training, Bed mobility, Gait training, Compensatory technique education          See flowsheet documentation for full assessment, interventions and recommendations.  Outcome: Not Progressing  Note: Prognosis: Good  Problem List: Impaired balance, Decreased mobility, Pain, Decreased skin integrity, Decreased strength, Decreased endurance, Decreased safety awareness  Assessment: pt shows decline in mobility status from previous session w/ decreased ambulation tolerance and increased level of assist to maintain safety. input was needed for mobility technique. pt remains at risk for falls due to physical and safety awareness deficits. continued inpatient PT is needed to decrease fall risk and progress mobility training. rehab resource intensity level changed from level III to level II due to decline in mobility status.        Rehab Resource Intensity Level, PT: (S) II (Moderate Resource Intensity)    See flowsheet documentation for full assessment.

## 2025-05-03 NOTE — PROGRESS NOTES
Progress Note - Critical Care/ICU   Name: Monica Avitia 80 y.o. female I MRN: [id number]  Unit/Bed#: ICU 02 I Date of Admission: 5/1/2025   Date of Service: 5/3/2025 I Hospital Day: 1      Assessment & Plan  Femoral hernia of right side with gangrene and obstruction  -Patient presented with abdominal pain, nausea, and vomiting  -Was found to have CT A/P with SBO secondary to strangulated right groin hernia  -Went to OR 5/2 Ex-lap with repair of strangulated femoral hernia and small bowel resection   -NPO with NGT in place  -Continue ceftriaxone and metronidazole  -Serial abdominal exams  -PT/OT evaluation  Hypotension  -Patient presented with syncopal episode following bowel movement and was found to be hypotensive with SBP 90s  -BP responsive to fluid resuscitation  -Patient was seen to have melenotic stool and some blood in NGT output  -Repeat H&Hs, electrolytes  -Continue surveillance for GI bleed  -Had additional hypotension shortly after arriving to ICU and was transfused 1 unit pRBCs  -Hemoglobin today stable, MAP>65 overnight  Diverticulosis  -Continue monitoring  Other hyperlipidemia  -Resume home atorvastatin when able to   Essential hypertension  -Holding amlodipine home med in the face of hypotension  Acquired hypothyroidism  -Resume levothyroxine when able to  Pulmonary fibrosis, unspecified (HCC)  -Currently without oxygen requirement, continue monitoring  SBO (small bowel obstruction) (HCC)  -See femoral hernia  Syncope  -See hypotension  -Had additional episode shortly after arrival to ICU and was transfused 1 unit pRBCs  Disposition: Stepdown Level 1    ICU Core Measures     A: Assess, Prevent, and Manage Pain Has pain been assessed? Yes  Need for changes to pain regimen? No   B: Both SAT/SAT  N/A   C: Choice of Sedation RASS Goal: 0 Alert and Calm  Need for changes to sedation or analgesia regimen? No   D: Delirium CAM-ICU: Negative   E: Early Mobility  Plan for early mobility? Yes   F:  Family Engagement Plan for family engagement today? Yes       Antibiotic Review: Patient on appropriate coverage based on culture data.       Prophylaxis:  VTE VTE covered by:  [Held by provider] enoxaparin, Subcutaneous, 40 mg at 05/02/25 0814       Stress Ulcer  covered bypantoprazole (PROTONIX) injection 40 mg [[id number]]         24 Hour Events : Had additional episode of syncope along with hypotension after arriving to ICU. She was transfused 1 unit pRBCs at that point with MAP>65 after this point.    Subjective   Review of Systems: See HPI for Review of Systems    Objective :                   Vitals I/O      Most Recent Min/Max in 24hrs   Temp 98.3 °F (36.8 °C) Temp  Min: 97 °F (36.1 °C)  Max: 98.8 °F (37.1 °C)   Pulse 72 Pulse  Min: 66  Max: 120   Resp 12 Resp  Min: 12  Max: 33   /55 BP  Min: 75/44  Max: 135/60   O2 Sat 96 % SpO2  Min: 93 %  Max: 100 %      Intake/Output Summary (Last 24 hours) at 5/3/2025 0556  Last data filed at 5/2/2025 2145  Gross per 24 hour   Intake 350 ml   Output --   Net 350 ml       Diet NPO    Invasive Monitoring           Physical Exam   Physical Exam  Vitals and nursing note reviewed.   Eyes:      Extraocular Movements: Extraocular movements intact.      Conjunctiva/sclera: Conjunctivae normal.      Pupils: Pupils are equal, round, and reactive to light.   Skin:     General: Skin is warm and dry.      Capillary Refill: Capillary refill takes less than 2 seconds.   HENT:      Head: Normocephalic and atraumatic.      Mouth/Throat:      Mouth: Mucous membranes are moist.   Cardiovascular:      Rate and Rhythm: Normal rate and regular rhythm.      Pulses: Normal pulses.   Abdominal: General: Bowel sounds are normal.      Palpations: Abdomen is soft.      Tenderness: There is no abdominal tenderness.      Comments: RLQ incision site with scant blood, intact.   Constitutional:       General: She is not in acute distress.  Pulmonary:      Effort: Pulmonary effort is normal.       Breath sounds: Normal breath sounds.   Neurological:      General: No focal deficit present.      Mental Status: She is alert.      Motor: Strength full and intact in all extremities.          Diagnostic Studies        Lab Results: I have reviewed the following results:     Medications:  Scheduled PRN   acetaminophen, 1,000 mg, Q6H ALEXANDRA  cefTRIAXone, 1,000 mg, Q24H  chlorhexidine, 15 mL, Q12H ALEXANDRA  [Held by provider] enoxaparin, 40 mg, Q24H ALEXANDRA  lactated ringers, 500 mL, Once  metroNIDAZOLE, 500 mg, Q12H  pantoprazole, 40 mg, Q12H ALEXANDRA      HYDROmorphone, 0.5 mg, Q4H PRN  HYDROmorphone, 0.2 mg, Q4H PRN  labetalol, 10 mg, Q6H PRN  ondansetron, 4 mg, Q6H PRN  phenol, 1 spray, Q2H PRN       Continuous    sodium chloride, 100 mL/hr, Last Rate: 100 mL/hr (05/03/25 0347)         Labs:   CBC    Recent Labs     05/02/25 1951 05/02/25  2314 05/03/25  0400   WBC 20.32*  --  17.65*   HGB 9.0* 9.6* 9.3*   HCT 27.5* 29.7* 28.4*     --  241   BANDSPCT 1  --   --      BMP    Recent Labs     05/02/25 1951 05/03/25  0400   SODIUM 132* 134*   K 3.8 4.7   CL 98 104   CO2 25 25   AGAP 9 5   BUN 26* 38*   CREATININE 0.89 0.75   CALCIUM 7.8* 8.0*       Coags    Recent Labs     05/01/25  2307   INR 0.97   PTT 25        Additional Electrolytes  Recent Labs     05/02/25 1951 05/03/25  0400   MG 2.9* 2.6   PHOS 4.3* 3.9   CAIONIZED 1.05* 1.12          Blood Gas    No recent results  Recent Labs     05/02/25 1954   PHVEN 7.345   XAH0FME 43.1   PO2VEN 23.3*   [id number] 23.0*   BEVEN -2.6   [id number] 32.4*    LFTs  Recent Labs     05/01/25  2142 05/02/25 1951   ALT 12 10   AST 23 15   ALKPHOS 51 33*   ALB 4.5 3.1*   TBILI 0.74 0.63       Infectious  Recent Labs     05/01/25 2142   PROCALCITONI <0.05     Glucose  Recent Labs     05/01/25 2142 05/02/25  0514 05/02/25 1951 05/03/25  0400   GLUC 108 157* 167* 131

## 2025-05-03 NOTE — ASSESSMENT & PLAN NOTE
-Patient presented with syncopal episode following bowel movement and was found to be hypotensive with SBP 90s  -BP responsive to fluid resuscitation  -Patient was seen to have melenotic stool and some blood in NGT output  -Repeat H&Hs, electrolytes  -Continue surveillance for GI bleed

## 2025-05-03 NOTE — PROGRESS NOTES
Progress Note - Surgery-General   Name: Monica Avitia 80 y.o. female I MRN: [id number]  Unit/Bed#: ICU 02 I Date of Admission: 5/1/2025   Date of Service: 5/3/2025 I Hospital Day: 1     Assessment & Plan  Femoral hernia of right side with gangrene and obstruction  80-year-old female with incarcerated femoral hernia containing bowel, status post open hernia repair with small bowel resection 5/2, rapid response called 5/2 for syncopal episode while having bowel movement, upgrade to ICU for close monitoring    5/2 1 unit PRBCs transfused    Now afebrile, vital signs stable within normal limits on room air    WBC 17.6  Hemoglobin 9.3 from 9.6 from 9  Creatinine 0.75    Plan  - Discontinue NG tube  - Advance to clears with diet  - Discontinue pam in right groin incision postop day 2, 5/4  - Continue IV antibiotics for 4 days postoperatively  - Repeat hemoglobin at noon  - Multimodal pain regimen  - Encourage ambulation/out of bed, 3 times daily  - Encourage incentive spirometer use, denies per hour  - Hold DVT prophylaxis 5/3 AM can consider restarting after hemoglobin recheck  - Rest of care per ICU team      Subjective   Patient seen and examined at bedside.  Patient reports she is feeling better this morning.  Abdominal pain is improved.  Patient having bowel movements, but denies flatus.  No nausea or vomiting fevers chills or shortness of breath.    Objective :  Temp:  [97 °F (36.1 °C)-98.5 °F (36.9 °C)] 98.3 °F (36.8 °C)  HR:  [] 84  BP: ()/(44-78) 108/55  Resp:  [9-33] 20  SpO2:  [93 %-100 %] 97 %    I/O         05/01 0701 05/02 0700 05/02 0701  05/03 0700 05/03 0701  05/04 0700    I.V. 1200 2000     Blood  350     IV Piggyback 200 550     Total Intake 1400 2900     Urine 450 350     Emesis/NG output 230      Total Output 680 350     Net +720 +2550                  Lines/Drains/Airways       Active Status       Name Placement date Placement time Site Days    NG/OG/Enteral Tube Nasogastric 18   Right nare 05/02/25  0115  Right nare  1                  Physical Exam  Vitals and nursing note reviewed.   Constitutional:       General: She is not in acute distress.     Appearance: Normal appearance. She is not ill-appearing or toxic-appearing.   HENT:      Head: Normocephalic and atraumatic.   Eyes:      General: No scleral icterus.  Cardiovascular:      Rate and Rhythm: Normal rate.   Pulmonary:      Effort: Pulmonary effort is normal.   Abdominal:      General: Abdomen is flat. There is no distension.      Palpations: Abdomen is soft.      Tenderness: There is no abdominal tenderness. There is no guarding.      Comments: Right groin incision with minimal serosanguineous drainage, pam in place, no erythema, minimally tender   Musculoskeletal:      Right lower leg: No edema.      Left lower leg: No edema.   Skin:     General: Skin is warm.   Neurological:      Mental Status: She is alert and oriented to person, place, and time.   Psychiatric:         Mood and Affect: Mood normal.         Lab Results: I have reviewed the following results:  Recent Labs     05/01/25  2307 05/02/25  0514 05/02/25  1951 05/02/25  2125 05/02/25  2314 05/03/25  0400   WBC  --    < > 20.32*  --   --  17.65*   HGB  --    < > 9.0*  --    < > 9.3*   HCT  --    < > 27.5*  --    < > 28.4*   PLT  --    < > 263  --   --  241   BANDSPCT  --   --  1  --   --   --    SODIUM  --    < > 132*  --   --  134*   K  --    < > 3.8  --   --  4.7   CL  --    < > 98  --   --  104   CO2  --    < > 25  --   --  25   BUN  --    < > 26*  --   --  38*   CREATININE  --    < > 0.89  --   --  0.75   GLUC  --    < > 167*  --   --  131   CAIONIZED  --    < > 1.05*  --   --  1.12   MG  --    < > 2.9*  --   --  2.6   PHOS  --    < > 4.3*  --   --  3.9   AST  --   --  15  --   --   --    ALT  --   --  10  --   --   --    ALB  --   --  3.1*  --   --   --    TBILI  --   --  0.63  --   --   --    ALKPHOS  --   --  33*  --   --   --    PTT 25  --   --   --   --   --     INR 0.97  --   --   --   --   --    HSTNI0  --   --  10  --   --   --    HSTNI2  --   --   --  9  --   --    LACTICACID 1.1  --  2.0  --   --   --     < > = values in this interval not displayed.       Imaging Results Review: No pertinent imaging studies reviewed.  Other Study Results Review: No additional pertinent studies reviewed.    VTE Pharmacologic Prophylaxis: VTE covered by:  [Held by provider] enoxaparin, Subcutaneous, 40 mg at 05/02/25 0814     VTE Mechanical Prophylaxis: sequential compression device

## 2025-05-03 NOTE — PHYSICAL THERAPY NOTE
PHYSICAL THERAPY TREATMENT NOTE    Patient Name: Monica Avitia  Today's Date: 5/3/2025     05/03/25 1636   PT Last Visit   PT Visit Date 05/03/25   Note Type   Note Type Treatment   Pain Assessment   Pain Assessment Tool 0-10   Pain Score 5   Pain Location/Orientation Location: Abdomen   Hospital Pain Intervention(s) Repositioned;Ambulation/increased activity   Restrictions/Precautions   Other Precautions Chair Alarm;Bed Alarm;Multiple lines;Telemetry;Pain;Fall Risk  (NSG removed NG tube during session)   General   Chart Reviewed Yes   Additional Pertinent History room air resting pulse ox 96% and 65 BPM, active 92% and 73 BPM. supine blood pressure 154/65, seated 122/64 and 73, standing 91/50, supine back in bed 92/54.   Family/Caregiver Present Yes   Cognition   Overall Cognitive Status WFL   Arousal/Participation Alert   Attention Within functional limits   Orientation Level Oriented to person  (pt was identified w/ ful name, birth date)   Following Commands Follows one step commands without difficulty   Subjective   Subjective pt seen supine in bed w/ family present. pt agreed to PT session. reports having abdominal pain. pt expresses concern about falling when attempting to mobilize.   Bed Mobility   Supine to Sit 4  Minimal assistance  (+ lightheaded)   Additional items Assist x 1;HOB elevated;Bedrails;Increased time required;Verbal cues;LE management  (for bedrail use, trunk/LE positioning)   Sit to Supine 4  Minimal assistance  (pt was returned to supine due to hypotension)   Additional items Assist x 1;HOB elevated;Increased time required;Verbal cues;LE management  (for trunk/LE positioning)   Transfers   Sit to Stand 4  Minimal assistance   Additional items Assist x 1;Increased time required;Verbal cues  (for hand placement)   Stand to Sit 4  Minimal assistance   Additional items Assist x 1;Increased time  required;Verbal cues  (for body positioning)   Ambulation/Elevation   Gait pattern Foward flexed;Short stride;Excessively slow;Decreased foot clearance   Gait Assistance 4  Minimal assist  (to modx1)   Additional items Assist x 1;Verbal cues  (for walker positioning, breathing technique)   Assistive Device Rolling walker   Distance 3 feet x2 w/ seated rest break  (additional ambulation not possible due to fatigue, pain and lightheadedness)   Balance   Static Sitting Fair +   Static Standing Poor +  (w/ roller walker)   Ambulatory Poor +  (to poor, w/ roller walker)   Activity Tolerance   Activity Tolerance Patient limited by fatigue;Patient limited by pain   Nurse Made Aware spoke to [place] POOL   Assessment   Problem List Impaired balance;Decreased mobility;Pain;Decreased skin integrity;Decreased strength;Decreased endurance;Decreased safety awareness   Assessment pt shows decline in mobility status from previous session w/ decreased ambulation tolerance and increased level of assist to maintain safety. input was needed for mobility technique. pt remains at risk for falls due to physical and safety awareness deficits. continued inpatient PT is needed to decrease fall risk and progress mobility training. rehab resource intensity level changed from level III to level II due to decline in mobility status.   Goals   Patient Goals I want to go home   STG Expiration Date 05/12/25   Short Term Goal #1 Pt will: perform bed mobility w/ mod I to decrease pt's burden of care and increase pt's independence w/ repositioning in bed; perform transfers w/ mod I to promote OOB mobility; ambulate 150' w/ LRAD and mod I to increase pt's ambulatory endurance/tolerance; negotiate 6 stair(s) w/ UE support and mod I to facilitate pt returning to previous living environment; increase all balance ratings by at least 1 grade to decrease pt's risk of falls   PT Treatment Day 1   Plan   Treatment/Interventions Functional transfer training;LE  strengthening/ROM;Elevations;Therapeutic exercise;Endurance training;Equipment eval/education;Patient/family training;Bed mobility;Gait training;Compensatory technique education   Progress Slow progress, decreased activity tolerance   PT Frequency 3-5x/wk   Discharge Recommendation   Rehab Resource Intensity Level, PT (S)  II (Moderate Resource Intensity)   AM-PAC Basic Mobility Inpatient   Turning in Flat Bed Without Bedrails 3   Lying on Back to Sitting on Edge of Flat Bed Without Bedrails 2   Moving Bed to Chair 3   Standing Up From Chair Using Arms 3   Walk in Room 3   Climb 3-5 Stairs With Railing 1   Basic Mobility Inpatient Raw Score 15   Basic Mobility Standardized Score 36.97   [facility] Highest Level Of Mobility   [facility] Goal 4: Move to chair/commode   [facility] Achieved 5: Stand (1 or more minutes)   End of Consult   Patient Position at End of Consult Supine;Bed/Chair alarm activated;All needs within reach     The patient's AM-PAC Basic Mobility Inpatient Short Form Raw Score is 15. A Raw score of less than or equal to 16 suggests the patient may benefit from discharge to post-acute rehabilitation services. Please also refer to the recommendation of the Physical Therapist for safe discharge planning.    Skilled inpatient PT recommended while in hospital to progress pt toward treatment goals.    Maxwell Peña, PT

## 2025-05-03 NOTE — QUICK NOTE
Shortly after coming to ICU following rapid response, at approx 2255, the patient had an episode of syncope lasting <10 seconds, subsequently felt dizzy, and was noted to have multiple BP readings with MAP 50-low 60s. Patient was consented for and given blood with normalization of BP and she stated that she no longer felt dizzy. Repeat H&H scheduled for midnight. Will continue to monitor closely.

## 2025-05-03 NOTE — RAPID RESPONSE
"Rapid Response Note  Monica Avitia 80 y.o. female MRN: [id number]  Unit/Bed#: ICU 02 Encounter: [id number]    Rapid Response Notification(s):   Response called date/time:  5/2/2025 7:10 PM  Response team arrival date/time:  5/2/2025 7:12 PM  Response end date/time:  5/2/2025 7:24 PM  [facility]  Rapid response location:  [facility] unit  Primary reason for rapid response call:  Acute change in BP and acute change in neuro status    Rapid Response Intervention(s):   Airway:  None  Breathing:  None  Circulation:  None  Fluids administered:  Normal saline  Medications administered:  None       Assessment:   Syncopal episode with hypotension  In setting of bloody bowel movement  Likely component of hypovolemia in setting of blood loss and/or vagal stimulation from bowel movement  X1 L NS with some improvement of BP to 100s systolic, fluid responsive    Plan:   Bolus of NS while in the room  Order Type and screen and 1 U pRBCs to transfuse if Hgb <7, acute drop in Hgb or persistent hypotension  ECG  Repeat labs - CBC, CMP, ical, magnesium, phosphorus, Troponin, Lactic acid  Transfer to ICU as SD1 for close monitoring under SCCS     Rapid Response Outcome:   Transfer:  Transfer to stepdown 1  Primary service notified of transfer: Yes    Code Status: Level 1 (Full Code)      Family notified: Primary team to notify Family Member       Background/Situation:   Monica Avitia is a 80 y.o. female with history of HTN, Right hernia repair in 1989, right hemicolectomy for colon cancer, hypothyroidism who presented to the [facility] ED for SBO on 5/1.    Per HPI:  \"Monica Avitia is a 80 y.o. female with a history of HTN, RIHR in 1989, right hemicolectomy for colon cancer, hypothyroidism who presents with one day of nausea, vomiting, abdominal pain. The pain started this morning while she was visiting her  who is currently admitted at this hospital. The pain continued to worsen prompting her to seek " "evaluation. She denies prior similar episodes. No fevers or chills. Last bowel movement yesterday. Not on AC/AP medications. Last colonoscopy in 2019 with healthy-appearing end-to-side ileocolonic anastomosis. Sigmoid diverticulosis and internal hemorrhoids. CT AP was obtained which showed a small bowel obstruction secondary to entrapment of the bowel loop within a right groin hernia. She is independent in her ADLs and physically active. Otherwise in good health prior to this event. Labs notable for a WBC of 15.6, Hgb 14, Cr 0.68. Attempted bedside reduction of groin hernia which was not successful.\"    Was taken to the OR, operative findings showed small area of necrotic small bowel, with small bowel resection performed overnight 5/1-5/2. Following operation, patient was admitted to the floor.    On 5/2 a rapid response was called. On Rapid team presentation to the room, patient was awake and slow to respond, but alert x3. Does not remember syncopyzing. Reports lightheadedness, palpitations, abdominal pain. Visible blood in stool, and dark NG tube output.     Review of Systems   Respiratory:  Negative for chest tightness and shortness of breath.    Cardiovascular:  Positive for palpitations. Negative for chest pain.   Gastrointestinal:  Positive for abdominal pain and blood in stool. Negative for abdominal distention.   Skin:  Positive for pallor.   Neurological:  Positive for syncope and light-headedness. Negative for dizziness.   All other systems reviewed and are negative.      Objective:   Vitals:    05/02/25 2135 05/02/25 2140 05/02/25 2145 05/02/25 2215   BP: 122/60 116/63 111/56 121/56   BP Location:       Pulse: 67 67 68 66   Resp: 12 16 18 17   Temp:  98 °F (36.7 °C) 98.2 °F (36.8 °C)    TempSrc:  Oral Oral    SpO2: 99% 100% 100% 100%     Physical Exam  Vitals and nursing note reviewed. Exam conducted with a chaperone present.   Constitutional:       Appearance: She is ill-appearing.   HENT:      Head: " Normocephalic and atraumatic.      Nose:      Comments: NG Tube in place, dark output     Mouth/Throat:      Mouth: Mucous membranes are dry.      Pharynx: Oropharynx is clear.   Eyes:      Extraocular Movements: Extraocular movements intact.      Pupils: Pupils are equal, round, and reactive to light.   Cardiovascular:      Rate and Rhythm: Regular rhythm. Tachycardia present.      Pulses: Normal pulses.   Pulmonary:      Effort: Pulmonary effort is normal.   Abdominal:      General: Abdomen is flat.      Tenderness: There is abdominal tenderness.      Comments: Surgical site clean, dry, no active discharge   Genitourinary:     Rectum: Guaiac result positive.      Comments: Visible dark blood in rectum and stool  Musculoskeletal:         General: No tenderness.      Right lower le+ Edema present.      Left lower le+ Edema present.   Skin:     General: Skin is warm and moist.      Capillary Refill: Capillary refill takes 2 to 3 seconds.      Coloration: Skin is pale.   Neurological:      Mental Status: She is alert and oriented to person, place, and time.      Motor: No weakness.   Psychiatric:         Mood and Affect: Mood normal.         Behavior: Behavior normal.

## 2025-05-04 PROBLEM — I95.9 HYPOTENSION: Status: RESOLVED | Noted: 2025-05-02 | Resolved: 2025-05-04

## 2025-05-04 LAB
2HR DELTA HS TROPONIN: -1 NG/L
4HR DELTA HS TROPONIN: -1 NG/L
ALBUMIN SERPL BCG-MCNC: 2.9 G/DL (ref 3.5–5)
ALP SERPL-CCNC: 30 U/L (ref 34–104)
ALT SERPL W P-5'-P-CCNC: 7 U/L (ref 7–52)
ANION GAP SERPL CALCULATED.3IONS-SCNC: 5 MMOL/L (ref 4–13)
ANION GAP SERPL CALCULATED.3IONS-SCNC: 5 MMOL/L (ref 4–13)
ANION GAP SERPL CALCULATED.3IONS-SCNC: 6 MMOL/L (ref 4–13)
ANION GAP SERPL CALCULATED.3IONS-SCNC: 8 MMOL/L (ref 4–13)
AST SERPL W P-5'-P-CCNC: 14 U/L (ref 13–39)
BASOPHILS # BLD MANUAL: 0 THOUSAND/UL (ref 0–0.1)
BASOPHILS NFR MAR MANUAL: 0 % (ref 0–1)
BILIRUB DIRECT SERPL-MCNC: 0.14 MG/DL (ref 0–0.2)
BILIRUB SERPL-MCNC: 0.65 MG/DL (ref 0.2–1)
BILIRUB UR QL STRIP: NEGATIVE
BUN SERPL-MCNC: 36 MG/DL (ref 5–25)
BUN SERPL-MCNC: 42 MG/DL (ref 5–25)
BUN SERPL-MCNC: 42 MG/DL (ref 5–25)
BUN SERPL-MCNC: 45 MG/DL (ref 5–25)
CALCIUM SERPL-MCNC: 6 MG/DL (ref 8.4–10.2)
CALCIUM SERPL-MCNC: 7.2 MG/DL (ref 8.4–10.2)
CALCIUM SERPL-MCNC: 7.4 MG/DL (ref 8.4–10.2)
CALCIUM SERPL-MCNC: 7.7 MG/DL (ref 8.4–10.2)
CARDIAC TROPONIN I PNL SERPL HS: 4 NG/L (ref ?–50)
CARDIAC TROPONIN I PNL SERPL HS: 4 NG/L (ref ?–50)
CARDIAC TROPONIN I PNL SERPL HS: 5 NG/L (ref ?–50)
CHLORIDE SERPL-SCNC: 106 MMOL/L (ref 96–108)
CHLORIDE SERPL-SCNC: 106 MMOL/L (ref 96–108)
CHLORIDE SERPL-SCNC: 108 MMOL/L (ref 96–108)
CHLORIDE SERPL-SCNC: 110 MMOL/L (ref 96–108)
CLARITY UR: CLEAR
CO2 SERPL-SCNC: 19 MMOL/L (ref 21–32)
CO2 SERPL-SCNC: 20 MMOL/L (ref 21–32)
CO2 SERPL-SCNC: 21 MMOL/L (ref 21–32)
CO2 SERPL-SCNC: 23 MMOL/L (ref 21–32)
COLOR UR: YELLOW
CREAT SERPL-MCNC: 0.64 MG/DL (ref 0.6–1.3)
CREAT SERPL-MCNC: 0.66 MG/DL (ref 0.6–1.3)
CREAT SERPL-MCNC: 0.82 MG/DL (ref 0.6–1.3)
CREAT SERPL-MCNC: 0.97 MG/DL (ref 0.6–1.3)
EOSINOPHIL # BLD MANUAL: 0 THOUSAND/UL (ref 0–0.4)
EOSINOPHIL NFR BLD MANUAL: 0 % (ref 0–6)
ERYTHROCYTE [DISTWIDTH] IN BLOOD BY AUTOMATED COUNT: 14.4 % (ref 11.6–15.1)
GFR SERPL CREATININE-BSD FRML MDRD: 55 ML/MIN/1.73SQ M
GFR SERPL CREATININE-BSD FRML MDRD: 67 ML/MIN/1.73SQ M
GFR SERPL CREATININE-BSD FRML MDRD: 83 ML/MIN/1.73SQ M
GFR SERPL CREATININE-BSD FRML MDRD: 84 ML/MIN/1.73SQ M
GLUCOSE SERPL-MCNC: 100 MG/DL (ref 65–140)
GLUCOSE SERPL-MCNC: 102 MG/DL (ref 65–140)
GLUCOSE SERPL-MCNC: 123 MG/DL (ref 65–140)
GLUCOSE SERPL-MCNC: 123 MG/DL (ref 65–140)
GLUCOSE SERPL-MCNC: 128 MG/DL (ref 65–140)
GLUCOSE SERPL-MCNC: 131 MG/DL (ref 65–140)
GLUCOSE SERPL-MCNC: 93 MG/DL (ref 65–140)
GLUCOSE UR STRIP-MCNC: NEGATIVE MG/DL
HCT VFR BLD AUTO: 23.8 % (ref 34.8–46.1)
HCT VFR BLD AUTO: 33 % (ref 34.8–46.1)
HGB BLD-MCNC: 10.2 G/DL (ref 11.5–15.4)
HGB BLD-MCNC: 7.8 G/DL (ref 11.5–15.4)
HGB UR QL STRIP.AUTO: NEGATIVE
KETONES UR STRIP-MCNC: NEGATIVE MG/DL
LACTATE SERPL-SCNC: 1.1 MMOL/L (ref 0.5–2)
LEUKOCYTE ESTERASE UR QL STRIP: NEGATIVE
LYMPHOCYTES # BLD AUTO: 0.7 THOUSAND/UL (ref 0.6–4.47)
LYMPHOCYTES # BLD AUTO: 4 % (ref 14–44)
MAGNESIUM SERPL-MCNC: 2.1 MG/DL (ref 1.9–2.7)
MCH RBC QN AUTO: 28.6 PG (ref 26.8–34.3)
MCHC RBC AUTO-ENTMCNC: 30.9 G/DL (ref 31.4–37.4)
MCV RBC AUTO: 92 FL (ref 82–98)
MONOCYTES # BLD AUTO: 0.52 THOUSAND/UL (ref 0–1.22)
MONOCYTES NFR BLD: 3 % (ref 4–12)
NEUTROPHILS # BLD MANUAL: 16.19 THOUSAND/UL (ref 1.85–7.62)
NEUTS BAND NFR BLD MANUAL: 15 % (ref 0–8)
NEUTS SEG NFR BLD AUTO: 78 % (ref 43–75)
NITRITE UR QL STRIP: NEGATIVE
PH UR STRIP.AUTO: 5.5 [PH]
PHOSPHATE SERPL-MCNC: 3.3 MG/DL (ref 2.3–4.1)
PLATELET # BLD AUTO: 219 THOUSANDS/UL (ref 149–390)
PLATELET BLD QL SMEAR: ADEQUATE
PMV BLD AUTO: 9.4 FL (ref 8.9–12.7)
POTASSIUM SERPL-SCNC: 3.5 MMOL/L (ref 3.5–5.3)
POTASSIUM SERPL-SCNC: 4.3 MMOL/L (ref 3.5–5.3)
POTASSIUM SERPL-SCNC: 4.3 MMOL/L (ref 3.5–5.3)
POTASSIUM SERPL-SCNC: 4.4 MMOL/L (ref 3.5–5.3)
PROT SERPL-MCNC: 5.4 G/DL (ref 6.4–8.4)
PROT UR STRIP-MCNC: NEGATIVE MG/DL
RBC # BLD AUTO: 3.57 MILLION/UL (ref 3.81–5.12)
RBC MORPH BLD: NORMAL
SODIUM SERPL-SCNC: 132 MMOL/L (ref 135–147)
SODIUM SERPL-SCNC: 134 MMOL/L (ref 135–147)
SODIUM SERPL-SCNC: 135 MMOL/L (ref 135–147)
SODIUM SERPL-SCNC: 136 MMOL/L (ref 135–147)
SP GR UR STRIP.AUTO: 1.03 (ref 1–1.03)
UROBILINOGEN UR STRIP-ACNC: <2 MG/DL
WBC # BLD AUTO: 17.41 THOUSAND/UL (ref 4.31–10.16)

## 2025-05-04 PROCEDURE — 81003 URINALYSIS AUTO W/O SCOPE: CPT | Performed by: PHYSICIAN ASSISTANT

## 2025-05-04 PROCEDURE — 85027 COMPLETE CBC AUTOMATED: CPT | Performed by: PHYSICIAN ASSISTANT

## 2025-05-04 PROCEDURE — 80048 BASIC METABOLIC PNL TOTAL CA: CPT | Performed by: PHYSICIAN ASSISTANT

## 2025-05-04 PROCEDURE — 99232 SBSQ HOSP IP/OBS MODERATE 35: CPT | Performed by: SURGERY

## 2025-05-04 PROCEDURE — 80076 HEPATIC FUNCTION PANEL: CPT | Performed by: PHYSICIAN ASSISTANT

## 2025-05-04 PROCEDURE — 99024 POSTOP FOLLOW-UP VISIT: CPT | Performed by: SURGERY

## 2025-05-04 PROCEDURE — 85018 HEMOGLOBIN: CPT

## 2025-05-04 PROCEDURE — 82948 REAGENT STRIP/BLOOD GLUCOSE: CPT

## 2025-05-04 PROCEDURE — 83735 ASSAY OF MAGNESIUM: CPT | Performed by: PHYSICIAN ASSISTANT

## 2025-05-04 PROCEDURE — 93005 ELECTROCARDIOGRAM TRACING: CPT

## 2025-05-04 PROCEDURE — 84484 ASSAY OF TROPONIN QUANT: CPT | Performed by: PHYSICIAN ASSISTANT

## 2025-05-04 PROCEDURE — 85014 HEMATOCRIT: CPT

## 2025-05-04 PROCEDURE — 84100 ASSAY OF PHOSPHORUS: CPT | Performed by: PHYSICIAN ASSISTANT

## 2025-05-04 PROCEDURE — 85007 BL SMEAR W/DIFF WBC COUNT: CPT | Performed by: PHYSICIAN ASSISTANT

## 2025-05-04 PROCEDURE — 83605 ASSAY OF LACTIC ACID: CPT | Performed by: PHYSICIAN ASSISTANT

## 2025-05-04 RX ORDER — SODIUM CHLORIDE, SODIUM GLUCONATE, SODIUM ACETATE, POTASSIUM CHLORIDE, MAGNESIUM CHLORIDE, SODIUM PHOSPHATE, DIBASIC, AND POTASSIUM PHOSPHATE .53; .5; .37; .037; .03; .012; .00082 G/100ML; G/100ML; G/100ML; G/100ML; G/100ML; G/100ML; G/100ML
1000 INJECTION, SOLUTION INTRAVENOUS ONCE
Status: COMPLETED | OUTPATIENT
Start: 2025-05-04 | End: 2025-05-05

## 2025-05-04 RX ORDER — ACETAMINOPHEN 10 MG/ML
1000 INJECTION, SOLUTION INTRAVENOUS EVERY 6 HOURS SCHEDULED
Status: DISCONTINUED | OUTPATIENT
Start: 2025-05-04 | End: 2025-05-05

## 2025-05-04 RX ORDER — HYDROMORPHONE HCL/PF 1 MG/ML
0.5 SYRINGE (ML) INJECTION EVERY 4 HOURS PRN
Refills: 0 | Status: DISCONTINUED | OUTPATIENT
Start: 2025-05-04 | End: 2025-05-13 | Stop reason: HOSPADM

## 2025-05-04 RX ORDER — PROMETHAZINE HYDROCHLORIDE 25 MG/ML
12.5 INJECTION, SOLUTION INTRAMUSCULAR; INTRAVENOUS ONCE
Status: COMPLETED | OUTPATIENT
Start: 2025-05-04 | End: 2025-05-04

## 2025-05-04 RX ADMIN — ONDANSETRON 4 MG: 2 INJECTION INTRAMUSCULAR; INTRAVENOUS at 04:20

## 2025-05-04 RX ADMIN — OXYCODONE HYDROCHLORIDE 5 MG: 5 TABLET ORAL at 02:13

## 2025-05-04 RX ADMIN — SODIUM CHLORIDE, SODIUM GLUCONATE, SODIUM ACETATE, POTASSIUM CHLORIDE, MAGNESIUM CHLORIDE, SODIUM PHOSPHATE, DIBASIC, AND POTASSIUM PHOSPHATE 1000 ML: .53; .5; .37; .037; .03; .012; .00082 INJECTION, SOLUTION INTRAVENOUS at 08:45

## 2025-05-04 RX ADMIN — CEFTRIAXONE SODIUM 1000 MG: 10 INJECTION, POWDER, FOR SOLUTION INTRAVENOUS at 05:06

## 2025-05-04 RX ADMIN — HYDROMORPHONE HYDROCHLORIDE 0.2 MG: 0.2 INJECTION, SOLUTION INTRAMUSCULAR; INTRAVENOUS; SUBCUTANEOUS at 04:04

## 2025-05-04 RX ADMIN — ACETAMINOPHEN 1000 MG: 10 INJECTION INTRAVENOUS at 11:59

## 2025-05-04 RX ADMIN — HYDROMORPHONE HYDROCHLORIDE 0.5 MG: 1 INJECTION, SOLUTION INTRAMUSCULAR; INTRAVENOUS; SUBCUTANEOUS at 06:06

## 2025-05-04 RX ADMIN — PANTOPRAZOLE SODIUM 40 MG: 40 INJECTION, POWDER, LYOPHILIZED, FOR SOLUTION INTRAVENOUS at 10:00

## 2025-05-04 RX ADMIN — PROMETHAZINE HYDROCHLORIDE 12.5 MG: 25 INJECTION INTRAMUSCULAR; INTRAVENOUS at 06:06

## 2025-05-04 RX ADMIN — METRONIDAZOLE 500 MG: 500 INJECTION, SOLUTION INTRAVENOUS at 05:06

## 2025-05-04 RX ADMIN — ACETAMINOPHEN 1000 MG: 10 INJECTION INTRAVENOUS at 16:44

## 2025-05-04 RX ADMIN — CHLORHEXIDINE GLUCONATE 15 ML: 1.2 SOLUTION ORAL at 21:17

## 2025-05-04 RX ADMIN — ACETAMINOPHEN 1000 MG: 10 INJECTION INTRAVENOUS at 21:17

## 2025-05-04 RX ADMIN — METRONIDAZOLE 500 MG: 500 INJECTION, SOLUTION INTRAVENOUS at 16:44

## 2025-05-04 RX ADMIN — PANTOPRAZOLE SODIUM 40 MG: 40 INJECTION, POWDER, LYOPHILIZED, FOR SOLUTION INTRAVENOUS at 21:17

## 2025-05-04 NOTE — ASSESSMENT & PLAN NOTE
Resolved  -Patient presented with syncopal episode following bowel movement and was found to be hypotensive with SBP 90s  -BP responsive to fluid resuscitation  -Patient was seen to have melenotic stool and some blood in NGT output  -Repeat H&Hs, electrolytes  -Continue surveillance for GI bleed  -Had additional hypotension shortly after arriving to ICU and was transfused 1 unit pRBCs  -Hemoglobin today stable, MAP>65 overnight

## 2025-05-04 NOTE — PROGRESS NOTES
Progress Note - Critical Care/ICU   Name: Monica Avitia 80 y.o. female I MRN: [id number]  Unit/Bed#: ICU 02 I Date of Admission: 5/1/2025   Date of Service: 5/4/2025 I Hospital Day: 2      Assessment & Plan  Femoral hernia of right side with gangrene and obstruction  -Patient presented with abdominal pain, nausea, and vomiting  -Was found to have CT A/P with SBO secondary to strangulated right groin hernia  -Went to OR 5/2 Ex-lap with repair of strangulated femoral hernia and small bowel resection   -NGT in place, started clears today, ADAT   -Continue ceftriaxone and metronidazole  -Serial abdominal exams  -PT/OT evaluation  Hypotension (Resolved: 5/4/2025)  Resolved  -Patient presented with syncopal episode following bowel movement and was found to be hypotensive with SBP 90s  -BP responsive to fluid resuscitation  -Patient was seen to have melenotic stool and some blood in NGT output  -Repeat H&Hs, electrolytes  -Continue surveillance for GI bleed  -Had additional hypotension shortly after arriving to ICU and was transfused 1 unit pRBCs  -Hemoglobin today stable, MAP>65 overnight  Diverticulosis  -Serial abdominal exams  Other hyperlipidemia  -Resume home atorvastatin when able  Essential hypertension  -Holding amlodipine home med in the face of hypotension, restart as able  Acquired hypothyroidism  -Continue PTA levothyroxine when able to tolerate PO  Pulmonary fibrosis, unspecified (HCC)  -Currently without oxygen requirement, continue monitoring   -Continuous pulse ox  SBO (small bowel obstruction) (HCC)  -See femoral hernia.   Syncope  -See hypotension  -Had additional episode shortly after arrival to ICU and was transfused 1 unit pRBCs  Disposition: Stepdown Level 1    ICU Core Measures     A: Assess, Prevent, and Manage Pain Has pain been assessed? Yes  Need for changes to pain regimen? No   B: Both SAT/SAT  N/A   C: Choice of Sedation RASS Goal: 0 Alert and Calm  Need for changes to sedation or  analgesia regimen? No   D: Delirium CAM-ICU: Negative   E: Early Mobility  Plan for early mobility? Yes   F: Family Engagement Plan for family engagement today? Yes       Antibiotic Review: Awaiting culture results.     Prophylaxis:  VTE VTE covered by:  [Held by provider] enoxaparin, Subcutaneous, 40 mg at 05/02/25 0814       Stress Ulcer  covered bypantoprazole (PROTONIX) injection 40 mg [[id number]]         24 Hour Events : No acute events overnight. Pt continues to complain of abdominal pain, specifically in the RUQ.     Subjective   Review of Systems: Review of Systems   Constitutional: Negative.    HENT: Negative.     Respiratory: Negative.     Cardiovascular: Negative.    Gastrointestinal:  Positive for abdominal pain.   Genitourinary: Negative.    Musculoskeletal: Negative.    Skin:  Positive for wound.   Neurological: Negative.    Psychiatric/Behavioral: Negative.         Objective :                   Vitals I/O      Most Recent Min/Max in 24hrs   Temp 98.5 °F (36.9 °C) Temp  Min: 98.4 °F (36.9 °C)  Max: 98.5 °F (36.9 °C)   Pulse 97 Pulse  Min: 72  Max: 107   Resp (!) 31 Resp  Min: 9  Max: 33   /56 BP  Min: 82/54  Max: 153/75   O2 Sat 98 % SpO2  Min: 94 %  Max: 100 %      Intake/Output Summary (Last 24 hours) at 5/4/2025 0013  Last data filed at 5/3/2025 2133  Gross per 24 hour   Intake 2700 ml   Output 1000 ml   Net 1700 ml       Diet Clear Liquid    Invasive Monitoring           Physical Exam   Physical Exam  Vitals and nursing note reviewed.   Eyes:      Conjunctiva/sclera: Conjunctivae normal.      Pupils: Pupils are equal, round, and reactive to light.   Skin:     General: Skin is warm and dry.   HENT:      Head: Normocephalic and atraumatic.      Mouth/Throat:      Mouth: Mucous membranes are moist.      Pharynx: Oropharynx is clear.   Cardiovascular:      Rate and Rhythm: Normal rate and regular rhythm.   Musculoskeletal:      Cervical back: Neck supple.   Abdominal: General: There is  distension.     Tenderness: There is generalized abdominal tenderness and tenderness in the right upper quadrant.      Comments: RUQ abdominal pain with palpation, radiating up chest wall  Nausea   Constitutional:       General: She is awake. She is not in acute distress.     Appearance: She is well-developed and well-nourished.   Pulmonary:      Effort: Pulmonary effort is normal.      Breath sounds: Normal breath sounds.   Neurological:      General: No focal deficit present.      Mental Status: She is alert.          Diagnostic Studies        Lab Results: I have reviewed the following results:     Medications:  Scheduled PRN   acetaminophen, 1,000 mg, Q6H ALEXANDRA  cefTRIAXone, 1,000 mg, Q24H  chlorhexidine, 15 mL, Q12H ALEXANDRA  [Held by provider] enoxaparin, 40 mg, Q24H ALEXANDRA  lactated ringers, 500 mL, Once  metroNIDAZOLE, 500 mg, Q12H  pantoprazole, 40 mg, Q12H ALEXANDRA      HYDROmorphone, 0.2 mg, Q4H PRN  labetalol, 10 mg, Q6H PRN  ondansetron, 4 mg, Q6H PRN  oxyCODONE, 5 mg, Q4H PRN  oxyCODONE, 2.5 mg, Q4H PRN       Continuous    sodium chloride, 75 mL/hr, Last Rate: 75 mL/hr (05/03/25 1644)         Labs:   CBC    Recent Labs     05/02/25 1951 05/02/25 2314 05/03/25 0400 05/03/25  1402 05/03/25  1919   WBC 20.32*  --  17.65* 18.61*  --    HGB 9.0*   < > 9.3* 8.5* 8.3*   HCT 27.5*   < > 28.4* 25.7*  --      --  241 239  --    BANDSPCT 1  --   --   --   --     < > = values in this interval not displayed.     BMP    Recent Labs     05/02/25 1951 05/03/25 0400   SODIUM 132* 134*   K 3.8 4.7   CL 98 104   CO2 25 25   AGAP 9 5   BUN 26* 38*   CREATININE 0.89 0.75   CALCIUM 7.8* 8.0*       Coags    No recent results     Additional Electrolytes  Recent Labs     05/02/25 1951 05/03/25 0400   MG 2.9* 2.6   PHOS 4.3* 3.9   CAIONIZED 1.05* 1.12          Blood Gas    No recent results  Recent Labs     05/02/25 1954   PHVEN 7.345   VHR6DYI 43.1   PO2VEN 23.3*   DBO9VIR 23.0*   BEVEN -2.6   V3YFCNG 32.4*    LFTs  Recent Labs      05/02/25 1951   ALT 10   AST 15   ALKPHOS 33*   ALB 3.1*   TBILI 0.63       Infectious  No recent results  Glucose  Recent Labs     05/02/25 0514 05/02/25 1951 05/03/25  0400   GLUC 157* 167* 131

## 2025-05-04 NOTE — ASSESSMENT & PLAN NOTE
-Patient presented with abdominal pain, nausea, and vomiting  -Was found to have CT A/P with SBO secondary to strangulated right groin hernia  -Went to OR 5/2 Ex-lap with repair of strangulated femoral hernia and small bowel resection   -NGT in place, started clears today, ADAT   -Continue ceftriaxone and metronidazole  -Serial abdominal exams  -PT/OT evaluation

## 2025-05-04 NOTE — PLAN OF CARE
Problem: PAIN - ADULT  Goal: Verbalizes/displays adequate comfort level or baseline comfort level  Description: Interventions:- Encourage patient to monitor pain and request assistance- Assess pain using appropriate pain scale- Administer analgesics based on type and severity of pain and evaluate response- Implement non-pharmacological measures as appropriate and evaluate response- Consider cultural and social influences on pain and pain management- Notify physician/advanced practitioner if interventions unsuccessful or patient reports new pain  Outcome: Progressing     Problem: INFECTION - ADULT  Goal: Absence or prevention of progression during hospitalization  Description: INTERVENTIONS:- Assess and monitor for signs and symptoms of infection- Monitor lab/diagnostic results- Monitor all insertion sites, i.e. indwelling lines, tubes, and drains- Monitor endotracheal if appropriate and nasal secretions for changes in amount and color- [place] appropriate cooling/warming therapies per order- Administer medications as ordered- Instruct and encourage patient and family to use good hand hygiene technique- Identify and instruct in appropriate isolation precautions for identified infection/condition  Outcome: Progressing  Goal: Absence of fever/infection during neutropenic period  Description: INTERVENTIONS:- Monitor WBC  Outcome: Progressing     Problem: SAFETY ADULT  Goal: Patient will remain free of falls  Description: INTERVENTIONS:- Educate patient/family on patient safety including physical limitations- Instruct patient to call for assistance with activity - Consult OT/PT to assist with strengthening/mobility - Keep Call bell within reach- Keep bed low and locked with side rails adjusted as appropriate- Keep care items and personal belongings within reach- Initiate and maintain comfort rounds- Make Fall Risk Sign visible to staff-Apply yellow socks and bracelet for high fall risk patients- Consider moving  patient to room near nurses station  Outcome: Progressing  Goal: Maintain or return to baseline ADL function  Description: INTERVENTIONS:-  Assess patient's ability to carry out ADLs; assess patient's baseline for ADL function and identify physical deficits which impact ability to perform ADLs (bathing, care of mouth/teeth, toileting, grooming, dressing, etc.)- Assess/evaluate cause of self-care deficits - Assess range of motion- Assess patient's mobility; develop plan if impaired- Assess patient's need for assistive devices and provide as appropriate- Encourage maximum independence but intervene and supervise when necessary- Involve family in performance of ADLs- Assess for home care needs following discharge - Consider OT consult to assist with ADL evaluation and planning for discharge- Provide patient education as appropriate  Outcome: Progressing  Goal: Maintains/Returns to pre admission functional level  Description: INTERVENTIONS:- Perform AM-PAC 6 Click Basic Mobility/ Daily Activity assessment daily.- Set and communicate daily mobility goal to care team and patient/family/caregiver. - Collaborate with rehabilitation services on mobility goals if consulted- Out of bed for toileting- Record patient progress and toleration of activity level   Outcome: Progressing     Problem: DISCHARGE PLANNING  Goal: Discharge to home or other facility with appropriate resources  Description: INTERVENTIONS:- Identify barriers to discharge w/patient and caregiver- Arrange for needed discharge resources and transportation as appropriate- Identify discharge learning needs (meds, wound care, etc.)- Arrange for interpretive services to assist at discharge as needed- Refer to Case Management Department for coordinating discharge planning if the patient needs post-hospital services based on physician/advanced practitioner order or complex needs related to functional status, cognitive ability, or social support system  Outcome:  Progressing     Problem: Knowledge Deficit  Goal: Patient/family/caregiver demonstrates understanding of disease process, treatment plan, medications, and discharge instructions  Description: Complete learning assessment and assess knowledge base.Interventions:- Provide teaching at level of understanding- Provide teaching via preferred learning methods  Outcome: Progressing     Problem: Prexisting or High Potential for Compromised Skin Integrity  Goal: Skin integrity is maintained or improved  Description: INTERVENTIONS:- Identify patients at risk for skin breakdown- Assess and monitor skin integrity- Assess and monitor nutrition and hydration status- Monitor labs - Assess for incontinence - Turn and reposition patient- Assist with mobility/ambulation- Relieve pressure over bony prominences- Avoid friction and shearing- Provide appropriate hygiene as needed including keeping skin clean and dry- Evaluate need for skin moisturizer/barrier cream- Collaborate with interdisciplinary team - Patient/family teaching- Consider wound care consult   Outcome: Progressing

## 2025-05-04 NOTE — ASSESSMENT & PLAN NOTE
-with incarcerated femoral hernia containing bowel, status post open hernia repair with small bowel resection 5/2  -rapid response called 5/2 for syncopal episode while having bowel movement, upgrade to ICU for close monitoring  -5/2 1 unit PRBCs transfused  -NGT removed and advanced to [facility] 5/3  -had episode of +n/v overnight into 5/4 w abdominal pain, labs unremarkable including LA  -will give some fluid given concern for hemoconcentration, continue to monitor     - Continue IV antibiotics for 4 days postoperatively  - Multimodal pain regimen  - Encourage ambulation/out of bed, 3 times daily  - Encourage incentive spirometer use, denies per hour  - Hold DVT prophylaxis 5/3 AM can consider restarting after hemoglobin recheck  - Rest of care per ICU team

## 2025-05-04 NOTE — PROGRESS NOTES
Progress Note - Surgery-General   Name: Monica Avitia 80 y.o. 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  -with incarcerated femoral hernia containing bowel, status post open hernia repair with small bowel resection 5/2  -rapid response called 5/2 for syncopal episode while having bowel movement, upgrade to ICU for close monitoring  -5/2 1 unit PRBCs transfused  -NGT removed and advanced to [facility] 5/3  -had episode of +n/v overnight into 5/4 w abdominal pain, labs unremarkable including LA  -will give some fluid given concern for hemoconcentration, continue to monitor     - Continue IV antibiotics for 4 days postoperatively  - Multimodal pain regimen  - Encourage ambulation/out of bed, 3 times daily  - Encourage incentive spirometer use, denies per hour  - Hold DVT prophylaxis 5/3 AM can consider restarting after hemoglobin recheck  - Rest of care per ICU team        Subjective   Some continued abdominal pain, nausea and emesis overnight, no bowel movements, minimally out of bed     Objective :  Temp:  [98.4 °F (36.9 °C)-98.5 °F (36.9 °C)] 98.5 °F (36.9 °C)  HR:  [] 99  BP: ()/(55-75) 110/59  Resp:  [12-33] 15  SpO2:  [93 %-100 %] 93 %  O2 Device: None (Room air)    I/O         05/02 0701 05/03 0700 05/03 0701 05/04 0700 05/04 0701 05/05 0700    I.V. 2000 2050     Blood 350      IV Piggyback 550 950     Total Intake 2900 3000     Urine 350 1000     Emesis/NG output       Total Output 350 1000     Net +2550 +2000                    Physical Exam    General: NAD  Skin: Warm, dry, anicteric  HEENT: Normocephalic, atraumatic  CV: RRR, no m/r/g  Pulm: CTA b/l, no inc WOB  Abd: Soft, minimally distended, mildly tender mainly lower abdomen  MSK: Symmetric, no edema, no tenderness, no deformity  Neuro: AOx3, GCS 15       Lab Results: I have reviewed  the following results:  Recent Labs     05/01/25  2307 05/02/25  0514 05/03/25  0400 05/03/25  1402 05/04/25  0022 05/04/25  0214 05/04/25  0434 05/04/25  0515   WBC  --    < > 17.65*   < >  --   --  17.41*  --    HGB  --    < > 9.3*   < >  --   --  10.2*  --    HCT  --    < > 28.4*   < >  --   --  33.0*  --    PLT  --    < > 241   < >  --   --  219  --    BANDSPCT  --    < >  --   --   --   --  15*  --    SODIUM  --    < > 134*  --   --   --   --  136   K  --    < > 4.7  --   --   --   --  4.3   CL  --    < > 104  --   --   --   --  108   CO2  --    < > 25  --   --   --   --  20*   BUN  --    < > 38*  --   --   --   --  42*   CREATININE  --    < > 0.75  --   --   --   --  0.64   GLUC  --    < > 131  --   --   --   --  128   CAIONIZED  --    < > 1.12  --   --   --   --   --    MG  --    < > 2.6  --   --   --   --  2.1   PHOS  --    < > 3.9  --   --   --   --  3.3   AST  --    < >  --   --   --   --   --  14   ALT  --    < >  --   --   --   --   --  7   ALB  --    < >  --   --   --   --   --  2.9*   TBILI  --    < >  --   --   --   --   --  0.65   ALKPHOS  --    < >  --   --   --   --   --  30*   PTT 25  --   --   --   --   --   --   --    INR 0.97  --   --   --   --   --   --   --    HSTNI0  --    < >  --   --  5  --   --   --    HSTNI2  --    < >  --   --   --  4  --   --    LACTICACID 1.1   < >  --   --  1.1  --   --   --     < > = values in this interval not displayed.             VTE Pharmacologic Prophylaxis: VTE covered by:  [Held by provider] enoxaparin, Subcutaneous, 40 mg at 05/02/25 0814     VTE Mechanical Prophylaxis: sequential compression device

## 2025-05-04 NOTE — ASSESSMENT & PLAN NOTE
-See hypotension  -Had additional episode shortly after arrival to ICU and was transfused 1 unit pRBCs   no

## 2025-05-05 ENCOUNTER — APPOINTMENT (INPATIENT)
Dept: RADIOLOGY | Facility: HOSPITAL | Age: 80
DRG: 329 | End: 2025-05-05
Payer: COMMERCIAL

## 2025-05-05 LAB
ANION GAP SERPL CALCULATED.3IONS-SCNC: 4 MMOL/L (ref 4–13)
ATRIAL RATE: 134 BPM
ATRIAL RATE: 74 BPM
ATRIAL RATE: 94 BPM
BASOPHILS # BLD MANUAL: 0 THOUSAND/UL (ref 0–0.1)
BASOPHILS NFR MAR MANUAL: 0 % (ref 0–1)
BUN SERPL-MCNC: 44 MG/DL (ref 5–25)
CA-I BLD-SCNC: 1.04 MMOL/L (ref 1.12–1.32)
CALCIUM SERPL-MCNC: 7.2 MG/DL (ref 8.4–10.2)
CHLORIDE SERPL-SCNC: 104 MMOL/L (ref 96–108)
CO2 SERPL-SCNC: 22 MMOL/L (ref 21–32)
CREAT SERPL-MCNC: 0.84 MG/DL (ref 0.6–1.3)
EOSINOPHIL # BLD MANUAL: 0 THOUSAND/UL (ref 0–0.4)
EOSINOPHIL NFR BLD MANUAL: 0 % (ref 0–6)
ERYTHROCYTE [DISTWIDTH] IN BLOOD BY AUTOMATED COUNT: 14.7 % (ref 11.6–15.1)
GFR SERPL CREATININE-BSD FRML MDRD: 65 ML/MIN/1.73SQ M
GLUCOSE SERPL-MCNC: 93 MG/DL (ref 65–140)
HCT VFR BLD AUTO: 25.5 % (ref 34.8–46.1)
HCT VFR BLD AUTO: 25.6 % (ref 34.8–46.1)
HGB BLD-MCNC: 8.1 G/DL (ref 11.5–15.4)
HGB BLD-MCNC: 8.1 G/DL (ref 11.5–15.4)
LYMPHOCYTES # BLD AUTO: 0.46 THOUSAND/UL (ref 0.6–4.47)
LYMPHOCYTES # BLD AUTO: 2 % (ref 14–44)
MAGNESIUM SERPL-MCNC: 2.1 MG/DL (ref 1.9–2.7)
MCH RBC QN AUTO: 28.6 PG (ref 26.8–34.3)
MCHC RBC AUTO-ENTMCNC: 31.6 G/DL (ref 31.4–37.4)
MCV RBC AUTO: 91 FL (ref 82–98)
MONOCYTES # BLD AUTO: 0.91 THOUSAND/UL (ref 0–1.22)
MONOCYTES NFR BLD: 4 % (ref 4–12)
NEUTROPHILS # BLD MANUAL: 21.44 THOUSAND/UL (ref 1.85–7.62)
NEUTS BAND NFR BLD MANUAL: 12 % (ref 0–8)
NEUTS SEG NFR BLD AUTO: 82 % (ref 43–75)
OSMOLALITY UR/SERPL-RTO: 295 MMOL/KG (ref 282–298)
OSMOLALITY UR: 574 MMOL/KG (ref 250–900)
P AXIS: 32 DEGREES
P AXIS: 33 DEGREES
P AXIS: 38 DEGREES
PHOSPHATE SERPL-MCNC: 2.6 MG/DL (ref 2.3–4.1)
PLATELET # BLD AUTO: 263 THOUSANDS/UL (ref 149–390)
PLATELET BLD QL SMEAR: ADEQUATE
PMV BLD AUTO: 9.5 FL (ref 8.9–12.7)
POLYCHROMASIA BLD QL SMEAR: PRESENT
POTASSIUM SERPL-SCNC: 4.1 MMOL/L (ref 3.5–5.3)
PR INTERVAL: 160 MS
PR INTERVAL: 186 MS
PR INTERVAL: 198 MS
QRS AXIS: 11 DEGREES
QRS AXIS: 11 DEGREES
QRS AXIS: 19 DEGREES
QRSD INTERVAL: 70 MS
QRSD INTERVAL: 74 MS
QRSD INTERVAL: 88 MS
QT INTERVAL: 272 MS
QT INTERVAL: 368 MS
QT INTERVAL: 400 MS
QTC INTERVAL: 406 MS
QTC INTERVAL: 444 MS
QTC INTERVAL: 460 MS
RBC # BLD AUTO: 2.83 MILLION/UL (ref 3.81–5.12)
RBC MORPH BLD: PRESENT
SODIUM SERPL-SCNC: 130 MMOL/L (ref 135–147)
SODIUM UR-SCNC: <10 MMOL/L
T WAVE AXIS: 18 DEGREES
T WAVE AXIS: 27 DEGREES
T WAVE AXIS: 29 DEGREES
VENTRICULAR RATE: 134 BPM
VENTRICULAR RATE: 74 BPM
VENTRICULAR RATE: 94 BPM
WBC # BLD AUTO: 22.81 THOUSAND/UL (ref 4.31–10.16)

## 2025-05-05 PROCEDURE — 85014 HEMATOCRIT: CPT

## 2025-05-05 PROCEDURE — 93010 ELECTROCARDIOGRAM REPORT: CPT | Performed by: STUDENT IN AN ORGANIZED HEALTH CARE EDUCATION/TRAINING PROGRAM

## 2025-05-05 PROCEDURE — 83735 ASSAY OF MAGNESIUM: CPT | Performed by: PHYSICIAN ASSISTANT

## 2025-05-05 PROCEDURE — 82330 ASSAY OF CALCIUM: CPT | Performed by: PHYSICIAN ASSISTANT

## 2025-05-05 PROCEDURE — 84100 ASSAY OF PHOSPHORUS: CPT | Performed by: PHYSICIAN ASSISTANT

## 2025-05-05 PROCEDURE — 85018 HEMOGLOBIN: CPT

## 2025-05-05 PROCEDURE — 85007 BL SMEAR W/DIFF WBC COUNT: CPT | Performed by: PHYSICIAN ASSISTANT

## 2025-05-05 PROCEDURE — NC001 PR NO CHARGE: Performed by: STUDENT IN AN ORGANIZED HEALTH CARE EDUCATION/TRAINING PROGRAM

## 2025-05-05 PROCEDURE — 97535 SELF CARE MNGMENT TRAINING: CPT

## 2025-05-05 PROCEDURE — 84300 ASSAY OF URINE SODIUM: CPT | Performed by: NURSE PRACTITIONER

## 2025-05-05 PROCEDURE — 85027 COMPLETE CBC AUTOMATED: CPT | Performed by: PHYSICIAN ASSISTANT

## 2025-05-05 PROCEDURE — 99024 POSTOP FOLLOW-UP VISIT: CPT | Performed by: SURGERY

## 2025-05-05 PROCEDURE — 80048 BASIC METABOLIC PNL TOTAL CA: CPT | Performed by: PHYSICIAN ASSISTANT

## 2025-05-05 PROCEDURE — 83935 ASSAY OF URINE OSMOLALITY: CPT | Performed by: NURSE PRACTITIONER

## 2025-05-05 PROCEDURE — 71045 X-RAY EXAM CHEST 1 VIEW: CPT

## 2025-05-05 PROCEDURE — 83930 ASSAY OF BLOOD OSMOLALITY: CPT | Performed by: NURSE PRACTITIONER

## 2025-05-05 PROCEDURE — 99233 SBSQ HOSP IP/OBS HIGH 50: CPT | Performed by: STUDENT IN AN ORGANIZED HEALTH CARE EDUCATION/TRAINING PROGRAM

## 2025-05-05 RX ORDER — CALCIUM GLUCONATE 20 MG/ML
1 INJECTION, SOLUTION INTRAVENOUS ONCE
Status: COMPLETED | OUTPATIENT
Start: 2025-05-05 | End: 2025-05-05

## 2025-05-05 RX ORDER — LANOLIN ALCOHOL/MO/W.PET/CERES
2 CREAM (GRAM) TOPICAL DAILY
Status: DISCONTINUED | OUTPATIENT
Start: 2025-05-05 | End: 2025-05-13 | Stop reason: HOSPADM

## 2025-05-05 RX ORDER — AMLODIPINE BESYLATE 2.5 MG/1
2.5 TABLET ORAL EVERY MORNING
Status: DISCONTINUED | OUTPATIENT
Start: 2025-05-05 | End: 2025-05-13 | Stop reason: HOSPADM

## 2025-05-05 RX ORDER — ATORVASTATIN CALCIUM 10 MG/1
10 TABLET, FILM COATED ORAL DAILY
Status: DISCONTINUED | OUTPATIENT
Start: 2025-05-05 | End: 2025-05-13 | Stop reason: HOSPADM

## 2025-05-05 RX ORDER — LEVOTHYROXINE SODIUM 75 UG/1
75 TABLET ORAL
Status: DISCONTINUED | OUTPATIENT
Start: 2025-05-06 | End: 2025-05-13 | Stop reason: HOSPADM

## 2025-05-05 RX ORDER — ACETAMINOPHEN 325 MG/1
975 TABLET ORAL EVERY 8 HOURS SCHEDULED
Status: DISCONTINUED | OUTPATIENT
Start: 2025-05-05 | End: 2025-05-13 | Stop reason: HOSPADM

## 2025-05-05 RX ORDER — HYDROCHLOROTHIAZIDE 25 MG/1
25 TABLET ORAL EVERY MORNING
Status: DISCONTINUED | OUTPATIENT
Start: 2025-05-05 | End: 2025-05-13 | Stop reason: HOSPADM

## 2025-05-05 RX ORDER — AMOXICILLIN 250 MG
2 CAPSULE ORAL
Status: DISCONTINUED | OUTPATIENT
Start: 2025-05-05 | End: 2025-05-08

## 2025-05-05 RX ORDER — HEPARIN SODIUM 5000 [USP'U]/ML
5000 INJECTION, SOLUTION INTRAVENOUS; SUBCUTANEOUS EVERY 8 HOURS SCHEDULED
Status: DISCONTINUED | OUTPATIENT
Start: 2025-05-05 | End: 2025-05-13 | Stop reason: HOSPADM

## 2025-05-05 RX ADMIN — METRONIDAZOLE 500 MG: 500 INJECTION, SOLUTION INTRAVENOUS at 16:54

## 2025-05-05 RX ADMIN — PANTOPRAZOLE SODIUM 40 MG: 40 INJECTION, POWDER, LYOPHILIZED, FOR SOLUTION INTRAVENOUS at 22:50

## 2025-05-05 RX ADMIN — ACETAMINOPHEN 975 MG: 325 TABLET, FILM COATED ORAL at 13:05

## 2025-05-05 RX ADMIN — ACETAMINOPHEN 1000 MG: 10 INJECTION INTRAVENOUS at 05:02

## 2025-05-05 RX ADMIN — CALCIUM GLUCONATE 1 G: 20 INJECTION, SOLUTION INTRAVENOUS at 09:00

## 2025-05-05 RX ADMIN — ACETAMINOPHEN 975 MG: 325 TABLET, FILM COATED ORAL at 22:49

## 2025-05-05 RX ADMIN — SENNOSIDES AND DOCUSATE SODIUM 2 TABLET: 50; 8.6 TABLET ORAL at 22:50

## 2025-05-05 RX ADMIN — HEPARIN SODIUM 5000 UNITS: 5000 INJECTION INTRAVENOUS; SUBCUTANEOUS at 13:05

## 2025-05-05 RX ADMIN — HEPARIN SODIUM 5000 UNITS: 5000 INJECTION INTRAVENOUS; SUBCUTANEOUS at 22:50

## 2025-05-05 RX ADMIN — HYDROCHLOROTHIAZIDE 25 MG: 25 TABLET ORAL at 14:48

## 2025-05-05 RX ADMIN — CHLORHEXIDINE GLUCONATE 15 ML: 1.2 SOLUTION ORAL at 22:49

## 2025-05-05 RX ADMIN — AMLODIPINE BESYLATE 2.5 MG: 2.5 TABLET ORAL at 14:48

## 2025-05-05 RX ADMIN — ATORVASTATIN CALCIUM 10 MG: 10 TABLET, FILM COATED ORAL at 12:34

## 2025-05-05 RX ADMIN — MAGNESIUM HYDROXIDE 30 ML: 400 SUSPENSION ORAL at 12:35

## 2025-05-05 RX ADMIN — CEFTRIAXONE SODIUM 1000 MG: 10 INJECTION, POWDER, FOR SOLUTION INTRAVENOUS at 05:02

## 2025-05-05 RX ADMIN — METRONIDAZOLE 500 MG: 500 INJECTION, SOLUTION INTRAVENOUS at 05:02

## 2025-05-05 RX ADMIN — PANTOPRAZOLE SODIUM 40 MG: 40 INJECTION, POWDER, LYOPHILIZED, FOR SOLUTION INTRAVENOUS at 09:00

## 2025-05-05 RX ADMIN — Medication 2 TABLET: at 14:48

## 2025-05-05 RX ADMIN — CHLORHEXIDINE GLUCONATE 15 ML: 1.2 SOLUTION ORAL at 09:00

## 2025-05-05 NOTE — PLAN OF CARE
Problem: PAIN - ADULT  Goal: Verbalizes/displays adequate comfort level or baseline comfort level  Description: Interventions:- Encourage patient to monitor pain and request assistance- Assess pain using appropriate pain scale- Administer analgesics based on type and severity of pain and evaluate response- Implement non-pharmacological measures as appropriate and evaluate response- Consider cultural and social influences on pain and pain management- Notify physician/advanced practitioner if interventions unsuccessful or patient reports new pain  Outcome: Progressing     Problem: INFECTION - ADULT  Goal: Absence or prevention of progression during hospitalization  Description: INTERVENTIONS:- Assess and monitor for signs and symptoms of infection- Monitor lab/diagnostic results- Monitor all insertion sites, i.e. indwelling lines, tubes, and drains- Monitor endotracheal if appropriate and nasal secretions for changes in amount and color- [place] appropriate cooling/warming therapies per order- Administer medications as ordered- Instruct and encourage patient and family to use good hand hygiene technique- Identify and instruct in appropriate isolation precautions for identified infection/condition  Outcome: Progressing  Goal: Absence of fever/infection during neutropenic period  Description: INTERVENTIONS:- Monitor WBC  Outcome: Progressing     Problem: SAFETY ADULT  Goal: Patient will remain free of falls  Description: INTERVENTIONS:- Educate patient/family on patient safety including physical limitations- Instruct patient to call for assistance with activity - Consult OT/PT to assist with strengthening/mobility - Keep Call bell within reach- Keep bed low and locked with side rails adjusted as appropriate- Keep care items and personal belongings within reach- Initiate and maintain comfort rounds- Make Fall Risk Sign visible to staff- Offer Toileting every 2 Hours, in advance of need- Initiate/Maintain bed/chair  alarm- Obtain necessary fall risk management equipment: - Apply yellow socks and bracelet for high fall risk patients- Consider moving patient to room near nurses station  Outcome: Progressing  Goal: Maintain or return to baseline ADL function  Description: INTERVENTIONS:-  Assess patient's ability to carry out ADLs; assess patient's baseline for ADL function and identify physical deficits which impact ability to perform ADLs (bathing, care of mouth/teeth, toileting, grooming, dressing, etc.)- Assess/evaluate cause of self-care deficits - Assess range of motion- Assess patient's mobility; develop plan if impaired- Assess patient's need for assistive devices and provide as appropriate- Encourage maximum independence but intervene and supervise when necessary- Involve family in performance of ADLs- Assess for home care needs following discharge - Consider OT consult to assist with ADL evaluation and planning for discharge- Provide patient education as appropriate  Outcome: Progressing  Goal: Maintains/Returns to pre admission functional level  Description: INTERVENTIONS:- Perform AM-PAC 6 Click Basic Mobility/ Daily Activity assessment daily.- Set and communicate daily mobility goal to care team and patient/family/caregiver. - Collaborate with rehabilitation services on mobility goals if consulted- Perform Range of Motion 3 times a day.- Reposition patient every 2 hours.- Dangle patient 2 times a day- Stand patient 2 times a day- Ambulate patient 2 times a day- Out of bed to chair 2 times a day - Out of bed for meals 2 times a day- Out of bed for toileting- Record patient progress and toleration of activity level   Outcome: Progressing     Problem: DISCHARGE PLANNING  Goal: Discharge to home or other facility with appropriate resources  Description: INTERVENTIONS:- Identify barriers to discharge w/patient and caregiver- Arrange for needed discharge resources and transportation as appropriate- Identify discharge  learning needs (meds, wound care, etc.)- Arrange for interpretive services to assist at discharge as needed- Refer to Case Management Department for coordinating discharge planning if the patient needs post-hospital services based on physician/advanced practitioner order or complex needs related to functional status, cognitive ability, or social support system  Outcome: Progressing     Problem: Knowledge Deficit  Goal: Patient/family/caregiver demonstrates understanding of disease process, treatment plan, medications, and discharge instructions  Description: Complete learning assessment and assess knowledge base.Interventions:- Provide teaching at level of understanding- Provide teaching via preferred learning methods  Outcome: Progressing     Problem: Prexisting or High Potential for Compromised Skin Integrity  Goal: Skin integrity is maintained or improved  Description: INTERVENTIONS:- Identify patients at risk for skin breakdown- Assess and monitor skin integrity- Assess and monitor nutrition and hydration status- Monitor labs - Assess for incontinence - Turn and reposition patient- Assist with mobility/ambulation- Relieve pressure over bony prominences- Avoid friction and shearing- Provide appropriate hygiene as needed including keeping skin clean and dry- Evaluate need for skin moisturizer/barrier cream- Collaborate with interdisciplinary team - Patient/family teaching- Consider wound care consult   Outcome: Progressing

## 2025-05-05 NOTE — PROGRESS NOTES
Progress Note - Critical Care/ICU   Name: Monica Avitia 80 y.o. 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 During the day on 5/4 patient no longer endorsed any abdominal pain or n/v. Continue to monitor.  Diverticulosis  -Serial abdominal exams  Other hyperlipidemia  -Resume home atorvastatin when able  Essential hypertension  -Holding amlodipine home med in the face of hypotension, restart as able  Acquired hypothyroidism  -Continue PTA levothyroxine when able to tolerate PO  Pulmonary fibrosis, unspecified (HCC)  -Currently without oxygen requirement, continue monitoring   -Continuous pulse ox  SBO (small bowel obstruction) (HCC)  -See femoral hernia.   Syncope  -See hypotension  -Had additional episode shortly after arrival to ICU and was transfused 1 unit pRBCs    Critical Care Interval Transfer Note:    Brief Hospital Summary:  79 yo female with hx of R hemicolectomy 2/2 colon CA, hypothyroidism and HTN presented with lower abdominal pain. Found to have a R strangulated femoral hernia and SBO. She had an exlap done, and small bowel resection. RRT was called for hypotension and melanotic stools. Initially concerned about anastomotic bleed, but not anymore. Received 1 unit of pRBCs due  to anemia.  Has had no complaints about pain today.        Barriers to discharge:   Diet  Pain control     Consults: IP CONSULT TO CASE MANAGEMENT  IP CONSULT TO CASE MANAGEMENT    Recommended to review admission imaging for incidental findings and document in discharge navigator: Chart reviewed, no known incidental findings noted at this time.      Discharge Plan: Anticipate discharge in 48 hrs to discharge location to be determined pending rehab evaluations.       Patient seen and evaluated by Critical Care today and deemed to be appropriate for transfer to Stepdown Level 1. Spoke to Yany Clay from [facility] surgery to accept transfer. Critical care can be contacted via SecureChat with any questions or concerns. Please use the Critical Care AP Role in Secure Chat for any provider inquires until the patient is transferred out of the ICU or until tomorrow at 0600.

## 2025-05-05 NOTE — OCCUPATIONAL THERAPY NOTE
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  Active Problems:    Diverticulosis    Other hyperlipidemia    Essential hypertension    Acquired hypothyroidism    Pulmonary fibrosis, unspecified (HCC)    SBO (small bowel obstruction) (HCC)    Syncope       05/05/25 1402   OT Last Visit   OT Visit Date 05/05/25  (Monday)   Note Type   Note Type Treatment  (tx session [id number])   Pain Assessment   Pain Assessment Tool FLACC   Pain Location/Orientation Location: Abdomen;Orientation: Bilateral;Orientation: Mid;Orientation: Lower   Effect of Pain on Daily Activities limits pace and activity tolerance during ADLs   Patient's Stated Pain Goal No pain   Hospital Pain Intervention(s) Repositioned;Ambulation/increased activity;Emotional support   Pain Rating: FLACC (Rest) - Face 0   Pain Rating: FLACC (Rest) - Legs 0   Pain Rating: FLACC (Rest) - Activity 0   Pain Rating: FLACC (Rest) - Cry 0   Pain Rating: FLACC (Rest) - Consolability 0   Score: FLACC (Rest) 0   Pain Rating: FLACC (Activity) - Face 1   Pain Rating: FLACC (Activity) - Legs 0   Pain Rating: FLACC (Activity) - Activity 1   Pain Rating: FLACC (Activity) - Cry 1   Pain Rating: FLACC (Activity) - Consolability 1   Score: FLACC (Activity) 4   Restrictions/Precautions   Weight Bearing Precautions Per Order No   Other Precautions Multiple lines;Telemetry;Fall Risk;Pain  (recommend chair alarm / bed alarm; seen in ICU; upgraded to ICU following rapid response on 5/2/25)   Lifestyle   Autonomy Pt reports I w/ ADLs at baseline w/ out use of AD   Reciprocal Relationships Supprotive , friends   Service to Others 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 Spending time w/ her friends   ADL   Where Assessed Chair  (vs commode)   Eating  "Assistance 6  Modified independent   Eating Deficit Setup;Increased time to complete   Eating Comments decreased appetite   Grooming Assistance 6  Modified Independent   Grooming Deficit Setup;Increased time to complete   Grooming Comments seated after set- up   UB Dressing Assistance 4  Minimal Assistance   UB Dressing Deficit Setup;Pull around back;Supervision/safety;Verbal cueing;Increased time to complete   UB Dressing Comments don/ manage robe   LB Dressing Assistance Unable to assess   LB Dressing Comments Pt declined don underwear / pants. Anticipate mod A based on fxal obs skills, clinical judgement   Toileting Assistance  3  Moderate Assistance   Toileting Deficit Setup;Bedside commode;Perineal hygiene;Clothing management up;Clothing management down   Toileting Comments attempted to void seated on commode.   Bed Mobility   Supine to Sit Unable to assess   Sit to Supine Unable to assess   Additional Comments Pt seated OOB in chair upon arrival and at end of session w/ needs met, call bell in reach on soft care cushion   Transfers   Sit to Stand 4  Minimal assistance   Additional items Assist x 1;Armrests;Increased time required;Verbal cues   Stand to Sit 4  Minimal assistance   Additional items Assist x 1;Armrests;Increased time required;Verbal cues   Toilet transfer 4  Minimal assistance   Additional items Assist x 1;Armrests;Commode   Additional Comments Pt performed sit <> stand 2X w/ min A   Functional Mobility   Functional Mobility 4  Minimal assistance   Additional Comments engaged in functional mobiilty approx 10 feet w/ min A using RW. Pt required + time and declined additional mobility reporting she needs to rest   Additional items Rolling walker   Toilet Transfers   Toilet Transfer From Rolling walker   Toilet Transfer Type To and from   Toilet Transfer to Standard bedside commode   Toilet Transfer Technique Ambulating   Toilet Transfers Minimal assistance;Verbal cues   Subjective   Subjective \"I " "have been peeing but the doctor said I need to poop\"   Cognition   Overall Cognitive Status WFL   Arousal/Participation Alert;Cooperative   Attention Attends with cues to redirect   Orientation Level Oriented X4   Memory Within functional limits   Following Commands Follows multistep commands without difficulty   Comments Identified pt by full name and birthdate. Alert, generally oriented and able to follow directions / communicate wants / needs   Activity Tolerance   Activity Tolerance Patient limited by fatigue;Patient limited by pain   Medical Staff Made Aware spoke w/ RNVero   Assessment   Assessment Pt seen for skilled OT tx session focusing on ongoing eval, pt education, challenging activity tolerance. Pt agreeable and motivated to participate. Pt received unit of blood on 5/2/25 following rapid response due to syncopal episode. Hgb 8.1 5/5/25 at 1140. Pt required increased min A to perform sit <> stand 2X. Pt engaged in functional mobility using RW w/ min A approx 10 feet. Pt required mod A to participate in clothing mgmt, personal hygiene during toileting. Pt engaged in UBD w/ min A due to B UE edema, abdominal pain and multiple lines. From an OT perspective, recommend level II rehab resource intensity when medically stable for discharge from acute care at this time to return to baseline level of I. Will continue to follow pt in acute care 3-5X week.   Plan   Treatment Interventions ADL retraining;Functional transfer training;UE strengthening/ROM;Endurance training;Patient/family training;Equipment evaluation/education;Compensatory technique education;Continued evaluation;Energy conservation;Activityengagement   Goal Expiration Date 05/12/25   OT Treatment Day 2  (Monday 5/5/25)   OT Frequency 3-5x/wk   Discharge Recommendation   Rehab Resource Intensity Level, OT II (Moderate Resource Intensity)   AM-PAC Daily Activity Inpatient   Lower Body Dressing 2   Bathing 2   Toileting 2   Upper Body Dressing 3 "   Grooming 4   Eating 4   Daily Activity Raw Score 17   Daily Activity Standardized Score (Calc for Raw Score >=11) 37.26   AM-PAC Applied Cognition Inpatient   Following a Speech/Presentation 4   Understanding Ordinary Conversation 4   Taking Medications 4   Remembering Where Things Are Placed or Put Away 4   Remembering List of 4-5 Errands 4   Taking Care of Complicated Tasks 4   Applied Cognition Raw Score 24   Applied Cognition Standardized Score 62.21   Barthel Index   Feeding 5   Bathing 0   Grooming Score 5   Dressing Score 5   Bladder Score 5   Bowels Score 5   Toilet Use Score 5   Transfers (Bed/Chair) Score 10   Mobility (Level Surface) Score 0   Stairs Score 0   Barthel Index Score 40   End of Consult   Education Provided Yes;Family or social support of family present for education by provider   Patient Position at End of Consult Bedside chair;All needs within reach   Nurse Communication Nurse aware of consult   Licensure   [place] License Number  Mickie Mcrae, OTR/L          The patient's raw score on the AM-PAC Daily Activity Inpatient Short Form is 17. A raw score of less than 19 suggests the patient may benefit from discharge to post-acute rehabilitation services. Please refer to the recommendation of the Occupational Therapist for safe discharge planning.      Mickie Mcrae, OTR/L  [id number]  [id number]

## 2025-05-05 NOTE — ASSESSMENT & PLAN NOTE
-with incarcerated femoral hernia containing bowel, status post open hernia repair with small bowel resection 5/2  -rapid response called 5/2 for syncopal episode while having bowel movement, upgrade to ICU for close monitoring  -5/2 1 unit PRBCs transfused    Afebrile, vital signs stable within normal limits on room air  WBC 33 from 22    Plan  - Soft diet  - CT CAP due to increasing WBC  - IV abx (Day 5)  - Multimodal pain regimen  - Encourage ambulation/out of bed, 3 times daily  - Encourage incentive spirometer use, 10 times per hour  - Strict I's and O's  - Aggressive pulmonary toilet, incentive spirometry  - DVT prophylaxis

## 2025-05-05 NOTE — PROGRESS NOTES
Progress Note - Surgery-General   Name: Monica Avitia 80 y.o. 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  -with incarcerated femoral hernia containing bowel, status post open hernia repair with small bowel resection 5/2  -rapid response called 5/2 for syncopal episode while having bowel movement, upgrade to ICU for close monitoring  -5/2 1 unit PRBCs transfused    Afebrile, vital signs stable within normal limits on room air    Urine output 750 cc    WBC 22.8 from 17.8  Hemoglobin 8.1 from 7.8 from 10.2 from 8.3 from 8.5  Creatinine 0.84 from 0.97    Plan  - Continue IV antibiotics for 4 days postoperatively, 5/5  - Multimodal pain regimen  - Encourage ambulation/out of bed, 3 times daily  - Encourage incentive spirometer use, 10 times per hour  - Strict I's and O's  - Aggressive pulmonary toilet  - DVT prophylaxis  - Rest of care per ICU team        Subjective   Patient seen and examined at bedside.  Patient reports her pain is okay.  Patient reports burping.  Patient denies fullness.  Patient tolerating a diet.  No nausea vomiting fevers chills or shortness of breath.  Patient is passing flatus and unsure if she had a bowel movement.    Objective :  Temp:  [98.5 °F (36.9 °C)-99 °F (37.2 °C)] 99 °F (37.2 °C)  HR:  [] 92  BP: ()/(51-76) 124/55  Resp:  [17-28] 28  SpO2:  [92 %-99 %] 96 %  O2 Device: None (Room air)    I/O         05/03 0701  05/04 0700 05/04 0701  05/05 0700    P.O.  960    I.V. 2050 2350    IV Piggyback 950 650    Total Intake 3000 3960    Urine 1000 750    Total Output 1000 750    Net +2000 +3210                  Physical Exam  Vitals and nursing note reviewed.   Constitutional:       General: She is not in acute distress.     Appearance: She is well-developed and normal weight. She is not ill-appearing or toxic-appearing.    HENT:      Head: Normocephalic and atraumatic.   Cardiovascular:      Rate and Rhythm: Normal rate.   Abdominal:      General: Abdomen is flat. There is no distension.      Palpations: Abdomen is soft.      Tenderness: There is no abdominal tenderness.      Comments: Right groin incision clean dry intact without drainage   Musculoskeletal:      Right lower leg: No edema.      Left lower leg: No edema.   Skin:     General: Skin is warm.   Neurological:      Mental Status: She is alert and oriented to person, place, and time.   Psychiatric:         Mood and Affect: Mood normal.         Lab Results: I have reviewed the following results:  Recent Labs     05/04/25  0022 05/04/25  0214 05/04/25  0434 05/04/25  0515 05/04/25  1420 05/05/25  0503   WBC  --   --  17.41*  --   --  22.81*   HGB  --   --  10.2*  --    < > 8.1*   HCT  --   --  33.0*  --    < > 25.6*   PLT  --   --  219  --   --  263   BANDSPCT  --   --  15*  --   --   --    SODIUM  --   --   --  136   < > 130*   K  --   --   --  4.3   < > 4.1   CL  --   --   --  108   < > 104   CO2  --   --   --  20*   < > 22   BUN  --   --   --  42*   < > 44*   CREATININE  --   --   --  0.64   < > 0.84   GLUC  --   --   --  128   < > 93   CAIONIZED  --   --   --   --   --  1.04*   MG  --   --   --  2.1  --  2.1   PHOS  --   --   --  3.3  --  2.6   AST  --   --   --  14  --   --    ALT  --   --   --  7  --   --    ALB  --   --   --  2.9*  --   --    TBILI  --   --   --  0.65  --   --    ALKPHOS  --   --   --  30*  --   --    HSTNI0 5  --   --   --   --   --    HSTNI2  --  4  --   --   --   --    LACTICACID 1.1  --   --   --   --   --     < > = values in this interval not displayed.       Imaging Results Review: No pertinent imaging studies reviewed.  Other Study Results Review: No additional pertinent studies reviewed.    VTE Pharmacologic Prophylaxis: VTE covered by:  [Held by provider] enoxaparin, Subcutaneous, 40 mg at 05/02/25 0814      VTE Mechanical Prophylaxis:  sequential compression device

## 2025-05-05 NOTE — CASE MANAGEMENT
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 2025     SBO (small bowel obstruction) (HCC) 2025     Syncope 2025     Prediabetes 2025     Pulmonary fibrosis, unspecified (HCC) 2020     Seasonal allergies 2019     Diverticulosis of intestine without bleeding 2019     Diverticulosis 2016     Internal hemorrhoids 2016     Osteopenia of multiple sites 2016     Neuropathy 2015     Malignant neoplasm of colon (HCC) 2015     Essential hypertension 10/02/2013     Vitamin D deficiency 10/02/2013     Other hyperlipidemia 2013     Acquired hypothyroidism 2013     Overweight 2013       LOS (days): 3  Geometric Mean LOS (GMLOS) (days): 9.6  Days to GMLOS:6     OBJECTIVE:  Risk of Unplanned Readmission Score: 20.85         Current admission status: Inpatient   Preferred Pharmacy:   [facility] [address] [place] [address]  Phone: [phone] Fax: [phone]    Primary Care Provider: Jeimy Torres MD    Primary Insurance: BL[employer] RE[employer]  Secondary Insurance:     DISCHARGE DETAILS:    Discharge planning discussed with:: Pt  Freedom of Choice: Yes  Comments - Freedom of Choice: [facility]    Other Referral/Resources/Interventions Provided:  Interventions: [facility]  Referral Comments: CM reviewed with pt that [facility] is able to accept for home threapy. Pt agreeable to same. CM reserved in [facility] and updated f/u providers.

## 2025-05-05 NOTE — PLAN OF CARE
Problem: OCCUPATIONAL THERAPY ADULT  Goal: Performs self-care activities at highest level of function for planned discharge setting.  See evaluation for individualized goals.  Description: Treatment Interventions: ADL retraining, Functional transfer training, UE strengthening/ROM, Endurance training, Patient/family training, Equipment evaluation/education, Compensatory technique education, Continued evaluation, Energy conservation, Activityengagement          See flowsheet documentation for full assessment, interventions and recommendations.   5/5/2025 1417 by Mickie Mcrae OT  Note: Limitation: Decreased ADL status, Decreased endurance, Decreased high-level ADLs, Decreased self-care trans  Prognosis: Good  Assessment: Pt seen for skilled OT tx session focusing on ongoing eval, pt education, challenging activity tolerance. Pt agreeable and motivated to participate. Pt received unit of blood on 5/2/25 following rapid response due to syncopal episode. Hgb 8.1 5/5/25 at 1140. Pt required increased min A to perform sit <> stand 2X. Pt engaged in functional mobility using RW w/ min A approx 10 feet. Pt required mod A to participate in clothing mgmt, personal hygiene during toileting. Pt engaged in UBD w/ min A due to B UE edema, abdominal pain and multiple lines. From an OT perspective, recommend level II rehab resource intensity when medically stable for discharge from acute care at this time to return to baseline level of I. Will continue to follow pt in acute care 3-5X week.     Rehab Resource Intensity Level, OT: II (Moderate Resource Intensity)       5/5/2025 1417 by Mickie Mcrae OT  Note: Limitation: Decreased ADL status, Decreased endurance, Decreased high-level ADLs, Decreased self-care trans  Prognosis: Good  Assessment: Pt seen for skilled OT tx session focusing on ongoing eval, pt education, challenging activity tolerance. Pt agreeable and motivated to participate. Pt received unit of blood on 5/2/25  following rapid response due to syncopal episode. Hgb 8.1 5/5/25 at 1140. Pt required increased min A to perform sit <> stand 2X. Pt engaged in functional mobility using RW w/ min A approx 10 feet. Pt required mod A to participate in clothing mgmt, personal hygiene during toileting. Pt engaged in UBD w/ min A due to B UE edema, abdominal pain and multiple lines. From an OT perspective, recommend level II rehab resource intensity when medically stable for discharge from acute care at this time to return to baseline level of I. Will continue to follow pt in acute care 3-5X week.     Rehab Resource Intensity Level, OT: II (Moderate Resource Intensity)

## 2025-05-05 NOTE — PROGRESS NOTES
Progress Note - Surgery-General   Name: Monica Avitia 80 y.o. 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  -with incarcerated femoral hernia containing bowel, status post open hernia repair with small bowel resection 5/2  -rapid response called 5/2 for syncopal episode while having bowel movement, upgrade to ICU for close monitoring  -5/2 1 unit PRBCs transfused    Afebrile, vital signs stable within normal limits on room air  WBC 33 from 22    Plan  - Soft diet  - CT CAP due to increasing WBC  - IV abx (Day 5)  - Multimodal pain regimen  - Encourage ambulation/out of bed, 3 times daily  - Encourage incentive spirometer use, 10 times per hour  - Strict I's and O's  - Aggressive pulmonary toilet, incentive spirometry  - DVT prophylaxis      Please contact the SecureChat role for the Surgery-General service with any questions/concerns.    Subjective   No acute events overnight. Patient reports no pain. They are tolerating their diet. They are having flatus but no BM in several days. They are voiding. They have been OOB to chair. They deny nausea, vomiting, chest pain, shortness of breath, fevers, chills.      Objective :  Temp:  [97.8 °F (36.6 °C)-99 °F (37.2 °C)] 97.8 °F (36.6 °C)  HR:  [84-98] 98  BP: ()/(51-63) 125/63  Resp:  [17-30] 30  SpO2:  [92 %-99 %] 98 %  O2 Device: None (Room air)    I/O         05/03 0701  05/04 0700 05/04 0701  05/05 0700 05/05 0701  05/06 0700    P.O.  960 120    I.V. 2050 2350 271    Blood       IV Piggyback 950 650 130    Total Intake 3000 3960 521    Urine 1000 750 400    Total Output 1000 750 400    Net +2000 +3210 +121                   Physical Exam  Constitutional:       General: She is not in acute distress.  HENT:      Head: Normocephalic and atraumatic.      Right Ear: External ear normal.      Left Ear:  External ear normal.      Nose: Nose normal.      Mouth/Throat:      Pharynx: Oropharynx is clear.   Eyes:      Extraocular Movements: Extraocular movements intact.   Cardiovascular:      Rate and Rhythm: Normal rate.   Pulmonary:      Effort: Pulmonary effort is normal.   Abdominal:      General: There is no distension.      Palpations: Abdomen is soft.      Tenderness: There is no abdominal tenderness.   Musculoskeletal:      Cervical back: Normal range of motion.   Skin:     General: Skin is warm and dry.      Comments: R groin incision clean, dry, intact   Neurological:      Mental Status: She is alert. Mental status is at baseline.   Psychiatric:         Mood and Affect: Mood normal.           Lab Results: I have reviewed the following results:  Recent Labs     05/04/25  0022 05/04/25  0214 05/04/25  0434 05/04/25  0515 05/04/25  1420 05/05/25  0503 05/05/25  1140   WBC  --   --    < >  --   --  22.81*  --    HGB  --   --    < >  --    < > 8.1* 8.1*   HCT  --   --    < >  --    < > 25.6* 25.5*   PLT  --   --    < >  --   --  263  --    BANDSPCT  --   --    < >  --   --  12*  --    SODIUM  --   --   --  136   < > 130*  --    K  --   --   --  4.3   < > 4.1  --    CL  --   --   --  108   < > 104  --    CO2  --   --   --  20*   < > 22  --    BUN  --   --   --  42*   < > 44*  --    CREATININE  --   --   --  0.64   < > 0.84  --    GLUC  --   --   --  128   < > 93  --    CAIONIZED  --   --   --   --   --  1.04*  --    MG  --   --   --  2.1  --  2.1  --    PHOS  --   --   --  3.3  --  2.6  --    AST  --   --   --  14  --   --   --    ALT  --   --   --  7  --   --   --    ALB  --   --   --  2.9*  --   --   --    TBILI  --   --   --  0.65  --   --   --    ALKPHOS  --   --   --  30*  --   --   --    HSTNI0 5  --   --   --   --   --   --    HSTNI2  --  4  --   --   --   --   --    LACTICACID 1.1  --   --   --   --   --   --     < > = values in this interval not displayed.       Imaging Results Review: I reviewed radiology  reports from this admission including: chest xray.  Other Study Results Review: No additional pertinent studies reviewed.    VTE Pharmacologic Prophylaxis: VTE covered by:  heparin (porcine), Subcutaneous, 5,000 Units at 05/05/25 1305     VTE Mechanical Prophylaxis: sequential compression device

## 2025-05-05 NOTE — PLAN OF CARE
Problem: PAIN - ADULT  Goal: Verbalizes/displays adequate comfort level or baseline comfort level  Description: Interventions:- Encourage patient to monitor pain and request assistance- Assess pain using appropriate pain scale- Administer analgesics based on type and severity of pain and evaluate response- Implement non-pharmacological measures as appropriate and evaluate response- Consider cultural and social influences on pain and pain management- Notify physician/advanced practitioner if interventions unsuccessful or patient reports new pain  Outcome: Progressing     Problem: INFECTION - ADULT  Goal: Absence or prevention of progression during hospitalization  Description: INTERVENTIONS:- Assess and monitor for signs and symptoms of infection- Monitor lab/diagnostic results- Monitor all insertion sites, i.e. indwelling lines, tubes, and drains- Monitor endotracheal if appropriate and nasal secretions for changes in amount and color- Rosalie appropriate cooling/warming therapies per order- Administer medications as ordered- Instruct and encourage patient and family to use good hand hygiene technique- Identify and instruct in appropriate isolation precautions for identified infection/condition  Outcome: Progressing  Goal: Absence of fever/infection during neutropenic period  Description: INTERVENTIONS:- Monitor WBC  Outcome: Progressing     Problem: SAFETY ADULT  Goal: Patient will remain free of falls  Description: INTERVENTIONS:- Educate patient/family on patient safety including physical limitations- Instruct patient to call for assistance with activity - Consult OT/PT to assist with strengthening/mobility - Keep Call bell within reach- Keep bed low and locked with side rails adjusted as appropriate- Keep care items and personal belongings within reach- Initiate and maintain comfort rounds- Make Fall Risk Sign visible to staff- Apply yellow socks and bracelet for high fall risk patients- Consider moving  patient to room near nurses station  Outcome: Progressing  Goal: Maintain or return to baseline ADL function  Description: INTERVENTIONS:-  Assess patient's ability to carry out ADLs; assess patient's baseline for ADL function and identify physical deficits which impact ability to perform ADLs (bathing, care of mouth/teeth, toileting, grooming, dressing, etc.)- Assess/evaluate cause of self-care deficits - Assess range of motion- Assess patient's mobility; develop plan if impaired- Assess patient's need for assistive devices and provide as appropriate- Encourage maximum independence but intervene and supervise when necessary- Involve family in performance of ADLs- Assess for home care needs following discharge - Consider OT consult to assist with ADL evaluation and planning for discharge- Provide patient education as appropriate  Outcome: Progressing  Goal: Maintains/Returns to pre admission functional level  Description: INTERVENTIONS:- Perform AM-PAC 6 Click Basic Mobility/ Daily Activity assessment daily.- Set and communicate daily mobility goal to care team and patient/family/caregiver. - Collaborate with rehabilitation services on mobility goals if consulted- Out of bed for toileting- Record patient progress and toleration of activity level   Outcome: Progressing     Problem: DISCHARGE PLANNING  Goal: Discharge to home or other facility with appropriate resources  Description: INTERVENTIONS:- Identify barriers to discharge w/patient and caregiver- Arrange for needed discharge resources and transportation as appropriate- Identify discharge learning needs (meds, wound care, etc.)- Arrange for interpretive services to assist at discharge as needed- Refer to Case Management Department for coordinating discharge planning if the patient needs post-hospital services based on physician/advanced practitioner order or complex needs related to functional status, cognitive ability, or social support system  Outcome:  Progressing     Problem: Knowledge Deficit  Goal: Patient/family/caregiver demonstrates understanding of disease process, treatment plan, medications, and discharge instructions  Description: Complete learning assessment and assess knowledge base.Interventions:- Provide teaching at level of understanding- Provide teaching via preferred learning methods  Outcome: Progressing     Problem: Prexisting or High Potential for Compromised Skin Integrity  Goal: Skin integrity is maintained or improved  Description: INTERVENTIONS:- Identify patients at risk for skin breakdown- Assess and monitor skin integrity- Assess and monitor nutrition and hydration status- Monitor labs - Assess for incontinence - Turn and reposition patient- Assist with mobility/ambulation- Relieve pressure over bony prominences- Avoid friction and shearing- Provide appropriate hygiene as needed including keeping skin clean and dry- Evaluate need for skin moisturizer/barrier cream- Collaborate with interdisciplinary team - Patient/family teaching- Consider wound care consult   Outcome: Progressing

## 2025-05-05 NOTE — CASE MANAGEMENT
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 2025     SBO (small bowel obstruction) (HCC) 2025     Syncope 2025     Prediabetes 2025     Pulmonary fibrosis, unspecified (HCC) 2020     Seasonal allergies 2019     Diverticulosis of intestine without bleeding 2019     Diverticulosis 2016     Internal hemorrhoids 2016     Osteopenia of multiple sites 2016     Neuropathy 2015     Malignant neoplasm of colon ([facility]) 2015     Essential hypertension 10/02/2013     Vitamin D deficiency 10/02/2013     Other hyperlipidemia 2013     Acquired hypothyroidism 2013     Overweight 2013       LOS (days): 3  Geometric Mean LOS (GMLOS) (days): 9.6  Days to GMLOS:6     OBJECTIVE:    Risk of Unplanned Readmission Score: 19.99         Current admission status: Inpatient       Preferred Pharmacy:   [facility] [address] [place] [address]  Phone: [phone] Fax: [phone]    Primary Care Provider: Jeimy Torres MD    Primary Insurance: [employer]  Secondary Insurance:     ASSESSMENT:  Active Health Care Proxies    There are no active Health Care Proxies on file.         DISCHARGE DETAILS:    Discharge planning discussed with:: Pt  Freedom of Choice: Yes  Comments - Freedom of Choice: C referrals    Contacts  Patient Contacts: Yasir  Relationship to Patient:: Family  Contact Method: Phone  Phone Number: see facesheet  Reason/Outcome: Continuity of Care, Emergency Contact, Discharge  Planning    Requested Home Health Care         Is the patient interested in C at discharge?: Yes  Home Health Discipline requested:: Nursing, Occupational Therapy, Physical Therapy  Home Health Follow-Up Provider:: PCP  Home Health Services Needed:: Other (comment), Gait/ADL Training, Evaluate Functional Status and Safety, Strengthening/Theraputic Exercises to Improve Function  Homebound Criteria Met:: Uses an Assist Device (i.e. cane, walker, etc)  Supporting Clincal Findings:: Limited Endurance    Other Referral/Resources/Interventions Provided:  Interventions: HHC, Short Term Rehab  Referral Comments: CM met with pt at bedside who was accompanied by a friend. CM reviewed therapy recommendations for STR. Pt reports she is not interested in rehab at this time. She is agreeable to home with [facility]. Pt understands home therapy will only come a couple times a week. Aware if she decides on rehab that CM can place referrals. Pt reports her  is the best person to call and update on conversation. CM reached out to Yasir. He asked that CM calls back later or tomorrow. Will call back and review recs.

## 2025-05-05 NOTE — PROGRESS NOTES
Patient:    MRN:  [id number]    Gold Request ID:  [id number]    Level of care reserved:  Home Health Agency    Partner Reserved:  [facility], [place] [address] [phone]    Clinical needs requested:    Geography searched:  [id number]    Start of Service:    Request sent:  3:33pm EDT on 5/5/2025 by Rakel Lanier    Partner reserved:  4:37pm EDT on 5/5/2025 by Rakel Lanier    Choice list shared:  4:36pm EDT on 5/5/2025 by Rakel Lanier

## 2025-05-05 NOTE — PROGRESS NOTES
Progress Note - Critical Care/ICU   Name: Monica Avitia 80 y.o. 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 During the day on 5/4 patient no longer endorsed any abdominal pain or n/v. Continue to monitor.  Diverticulosis  -Serial abdominal exams  Other hyperlipidemia  -Resume home atorvastatin when able  Essential hypertension  -Holding amlodipine home med in the face of hypotension, restart as able  Acquired hypothyroidism  -Continue PTA levothyroxine when able to tolerate PO  Pulmonary fibrosis, unspecified (HCC)  -Currently without oxygen requirement, continue monitoring   -Continuous pulse ox  SBO (small bowel obstruction) (HCC)  -See femoral hernia.   Syncope  -See hypotension  -Had additional episode shortly after arrival to ICU and was transfused 1 unit pRBCs  Disposition: Stepdown Level 1    ICU Core Measures     A: Assess, Prevent, and Manage Pain Has pain been assessed? Yes  Need for changes to pain regimen? No   B: Both SAT/SAT  N/A   C: Choice of Sedation RASS Goal: 0 Alert and Calm  Need for changes to sedation or analgesia regimen? No   D: Delirium CAM-ICU: Negative   E: Early Mobility  Plan for early mobility? Yes   F: Family Engagement Plan for family engagement today? Yes        Antibiotic Review: Patient on appropriate coverage based on culture data.       Prophylaxis:  VTE VTE covered by:  [Held by provider] enoxaparin, Subcutaneous, 40 mg at 05/02/25 0814       Stress Ulcer  covered bypantoprazole (PROTONIX) injection 40 mg [[id number]]         24 Hour Events : No acute events overnight. Patient no longer complained of any significant abdominal pain or n/v.    Subjective       Objective :                   Vitals I/O      Most Recent Min/Max in 24hrs   Temp 98.5 °F (36.9 °C) Temp  Min: 98.5 °F (36.9 °C)  Max: 98.5 °F (36.9 °C)   Pulse 88 Pulse  Min: 88  Max: 112   Resp 18 Resp  Min: 15  Max: 30   /54 BP  Min: 101/57  Max: 121/60   O2 Sat 96 % SpO2  Min: 93 %  Max: 99 %      Intake/Output Summary (Last 24 hours) at 5/4/2025 2233  Last data filed at 5/4/2025 1800  Gross per 24 hour   Intake 2200 ml   Output 350 ml   Net 1850 ml       Diet Surgical; Cl Liq [place]    Invasive Monitoring           Physical Exam   Physical Exam  Vitals and nursing note reviewed.   Eyes:      Conjunctiva/sclera: Conjunctivae normal.      Pupils: Pupils are equal, round, and reactive to light.   Skin:     General: Skin is warm and dry.   HENT:      Head: Normocephalic and atraumatic.      Mouth/Throat:      Mouth: Mucous membranes are moist.      Pharynx: Oropharynx is clear.   Cardiovascular:      Rate and Rhythm: Normal rate and regular rhythm.   Musculoskeletal:      Cervical back: Neck supple.   Abdominal: General: There is distension.     Tenderness: There is generalized abdominal tenderness and tenderness in the right upper quadrant.      Comments: RUQ abdominal pain with palpation, mild   Constitutional:       General: She is awake. She is not in acute distress.     Appearance: She is well-developed and well-nourished.   Pulmonary:      Effort: Pulmonary effort is normal.      Breath sounds: Normal breath sounds.   Neurological:      General: No focal deficit present.      Mental Status:  She is alert.          Diagnostic Studies        Lab Results: I have reviewed the following results:     Medications:  Scheduled PRN   acetaminophen, 1,000 mg, Q6H ALEXANDRA  cefTRIAXone, 1,000 mg, Q24H  chlorhexidine, 15 mL, Q12H ALEXANDRA  [Held by provider] enoxaparin, 40 mg, Q24H ALEXANDRA  lactated ringers, 500 mL, Once  metroNIDAZOLE, 500 mg, Q12H  pantoprazole, 40 mg, Q12H ALEXANDRA      HYDROmorphone, 0.5 mg, Q4H PRN  labetalol, 10 mg, Q6H PRN  ondansetron, 4 mg, Q6H PRN  oxyCODONE, 5 mg, Q4H PRN  oxyCODONE, 2.5 mg, Q4H PRN       Continuous    sodium chloride, 100 mL/hr, Last Rate: 100 mL/hr (05/04/25 0845)         Labs:   CBC    Recent Labs     05/03/25  1402 05/03/25  1919 05/04/25  0434   WBC 18.61*  --  17.41*   HGB 8.5* 8.3* 10.2*   HCT 25.7*  --  33.0*     --  219   BANDSPCT  --   --  15*     BMP    Recent Labs     05/04/25  1420 05/04/25  1653   SODIUM 135 134*   K 4.3 3.5    110*   CO2 23 19*   AGAP 6 5   BUN 42* 36*   CREATININE 0.82 0.66   CALCIUM 7.4* 6.0*       Coags    No recent results     Additional Electrolytes  Recent Labs     05/03/25  0400 05/04/25  0515   MG 2.6 2.1   PHOS 3.9 3.3   CAIONIZED 1.12  --           Blood Gas    No recent results  No recent results LFTs  Recent Labs     05/04/25  0515   ALT 7   AST 14   ALKPHOS 30*   ALB 2.9*   TBILI 0.65       Infectious  No recent results  Glucose  Recent Labs     05/03/25  0400 05/04/25  0515 05/04/25  1420 05/04/25  1653   GLUC 131 128 123 93

## 2025-05-05 NOTE — ASSESSMENT & PLAN NOTE
-Patient presented with abdominal pain, nausea, and vomiting  -Was found to have CT A/P with SBO secondary to strangulated right groin hernia  -Went to OR 5/2 Ex-lap with repair of strangulated femoral hernia and small bowel resection   -NGT in place, started clears today, ADAT   -New bandemia 5/4 labs, remains afebrile  -Continue ceftriaxone and metronidazole in the setting of new bandemia  -Serial abdominal exams  -PT/OT evaluation  -Advanced to clear liquids  -Patient had episode of n/v overnight into 5/4 w abdominal pain, labs unremarkable including lactic acid. During the day on 5/4 patient no longer endorsed any abdominal pain or n/v. Continue to monitor.

## 2025-05-05 NOTE — ASSESSMENT & PLAN NOTE
-with incarcerated femoral hernia containing bowel, status post open hernia repair with small bowel resection 5/2  -rapid response called 5/2 for syncopal episode while having bowel movement, upgrade to ICU for close monitoring  -5/2 1 unit PRBCs transfused    Afebrile, vital signs stable within normal limits on room air    Urine output 750 cc    WBC 22.8 from 17.8  Hemoglobin 8.1 from 7.8 from 10.2 from 8.3 from 8.5  Creatinine 0.84 from 0.97    Plan  - Continue IV antibiotics for 4 days postoperatively, 5/5  - Multimodal pain regimen  - Encourage ambulation/out of bed, 3 times daily  - Encourage incentive spirometer use, 10 times per hour  - Strict I's and O's  - Aggressive pulmonary toilet  - DVT prophylaxis  - Rest of care per ICU team

## 2025-05-06 ENCOUNTER — HOME HEALTH ADMISSION (OUTPATIENT)
Dept: HOME HEALTH SERVICES | Facility: HOME HEALTHCARE | Age: 80
End: 2025-05-06
Payer: COMMERCIAL

## 2025-05-06 ENCOUNTER — APPOINTMENT (INPATIENT)
Dept: CT IMAGING | Facility: HOSPITAL | Age: 80
DRG: 329 | End: 2025-05-06
Payer: COMMERCIAL

## 2025-05-06 LAB
ANION GAP SERPL CALCULATED.3IONS-SCNC: 6 MMOL/L (ref 4–13)
ANISOCYTOSIS BLD QL SMEAR: PRESENT
BASOPHILS # BLD MANUAL: 0 THOUSAND/UL (ref 0–0.1)
BASOPHILS NFR MAR MANUAL: 0 % (ref 0–1)
BUN SERPL-MCNC: 37 MG/DL (ref 5–25)
CALCIUM SERPL-MCNC: 7.9 MG/DL (ref 8.4–10.2)
CHLORIDE SERPL-SCNC: 105 MMOL/L (ref 96–108)
CO2 SERPL-SCNC: 23 MMOL/L (ref 21–32)
CREAT SERPL-MCNC: 0.62 MG/DL (ref 0.6–1.3)
EOSINOPHIL # BLD MANUAL: 0 THOUSAND/UL (ref 0–0.4)
EOSINOPHIL NFR BLD MANUAL: 0 % (ref 0–6)
ERYTHROCYTE [DISTWIDTH] IN BLOOD BY AUTOMATED COUNT: 15.2 % (ref 11.6–15.1)
GFR SERPL CREATININE-BSD FRML MDRD: 85 ML/MIN/1.73SQ M
GLUCOSE SERPL-MCNC: 85 MG/DL (ref 65–140)
HCT VFR BLD AUTO: 23 % (ref 34.8–46.1)
HGB BLD-MCNC: 7.3 G/DL (ref 11.5–15.4)
HYPERCHROMIA BLD QL SMEAR: PRESENT
LYMPHOCYTES # BLD AUTO: 1.34 THOUSAND/UL (ref 0.6–4.47)
LYMPHOCYTES # BLD AUTO: 4 % (ref 14–44)
MAGNESIUM SERPL-MCNC: 2.4 MG/DL (ref 1.9–2.7)
MCH RBC QN AUTO: 28.6 PG (ref 26.8–34.3)
MCHC RBC AUTO-ENTMCNC: 31.7 G/DL (ref 31.4–37.4)
MCV RBC AUTO: 90 FL (ref 82–98)
MONOCYTES # BLD AUTO: 0.34 THOUSAND/UL (ref 0–1.22)
MONOCYTES NFR BLD: 1 % (ref 4–12)
NEUTROPHILS # BLD MANUAL: 31.92 THOUSAND/UL (ref 1.85–7.62)
NEUTS SEG NFR BLD AUTO: 95 % (ref 43–75)
PHOSPHATE SERPL-MCNC: 2.5 MG/DL (ref 2.3–4.1)
PLATELET # BLD AUTO: 296 THOUSANDS/UL (ref 149–390)
PLATELET BLD QL SMEAR: ADEQUATE
PMV BLD AUTO: 9.3 FL (ref 8.9–12.7)
POTASSIUM SERPL-SCNC: 4 MMOL/L (ref 3.5–5.3)
RBC # BLD AUTO: 2.55 MILLION/UL (ref 3.81–5.12)
RBC MORPH BLD: PRESENT
SODIUM SERPL-SCNC: 134 MMOL/L (ref 135–147)
WBC # BLD AUTO: 33.6 THOUSAND/UL (ref 4.31–10.16)

## 2025-05-06 PROCEDURE — 80048 BASIC METABOLIC PNL TOTAL CA: CPT | Performed by: SURGERY

## 2025-05-06 PROCEDURE — 71260 CT THORAX DX C+: CPT

## 2025-05-06 PROCEDURE — 85027 COMPLETE CBC AUTOMATED: CPT | Performed by: SURGERY

## 2025-05-06 PROCEDURE — 85007 BL SMEAR W/DIFF WBC COUNT: CPT | Performed by: SURGERY

## 2025-05-06 PROCEDURE — 84100 ASSAY OF PHOSPHORUS: CPT | Performed by: SURGERY

## 2025-05-06 PROCEDURE — 83735 ASSAY OF MAGNESIUM: CPT | Performed by: SURGERY

## 2025-05-06 PROCEDURE — 99024 POSTOP FOLLOW-UP VISIT: CPT | Performed by: SURGERY

## 2025-05-06 PROCEDURE — 74177 CT ABD & PELVIS W/CONTRAST: CPT

## 2025-05-06 PROCEDURE — 97116 GAIT TRAINING THERAPY: CPT

## 2025-05-06 PROCEDURE — 97530 THERAPEUTIC ACTIVITIES: CPT

## 2025-05-06 PROCEDURE — 88307 TISSUE EXAM BY PATHOLOGIST: CPT | Performed by: PATHOLOGY

## 2025-05-06 RX ORDER — FAMOTIDINE 20 MG/1
20 TABLET, FILM COATED ORAL ONCE
Status: COMPLETED | OUTPATIENT
Start: 2025-05-06 | End: 2025-05-06

## 2025-05-06 RX ADMIN — Medication 2 TABLET: at 09:13

## 2025-05-06 RX ADMIN — HEPARIN SODIUM 5000 UNITS: 5000 INJECTION INTRAVENOUS; SUBCUTANEOUS at 21:14

## 2025-05-06 RX ADMIN — LEVOTHYROXINE SODIUM 75 MCG: 75 TABLET ORAL at 04:43

## 2025-05-06 RX ADMIN — HEPARIN SODIUM 5000 UNITS: 5000 INJECTION INTRAVENOUS; SUBCUTANEOUS at 04:43

## 2025-05-06 RX ADMIN — FAMOTIDINE 20 MG: 20 TABLET, FILM COATED ORAL at 04:43

## 2025-05-06 RX ADMIN — PANTOPRAZOLE SODIUM 40 MG: 40 INJECTION, POWDER, LYOPHILIZED, FOR SOLUTION INTRAVENOUS at 21:13

## 2025-05-06 RX ADMIN — ATORVASTATIN CALCIUM 10 MG: 10 TABLET, FILM COATED ORAL at 09:13

## 2025-05-06 RX ADMIN — HEPARIN SODIUM 5000 UNITS: 5000 INJECTION INTRAVENOUS; SUBCUTANEOUS at 14:46

## 2025-05-06 RX ADMIN — PANTOPRAZOLE SODIUM 40 MG: 40 INJECTION, POWDER, LYOPHILIZED, FOR SOLUTION INTRAVENOUS at 09:13

## 2025-05-06 RX ADMIN — HYDROCHLOROTHIAZIDE 25 MG: 25 TABLET ORAL at 09:13

## 2025-05-06 RX ADMIN — CHLORHEXIDINE GLUCONATE 15 ML: 1.2 SOLUTION ORAL at 09:13

## 2025-05-06 RX ADMIN — AMLODIPINE BESYLATE 2.5 MG: 2.5 TABLET ORAL at 09:13

## 2025-05-06 RX ADMIN — CEFTRIAXONE SODIUM 1000 MG: 10 INJECTION, POWDER, FOR SOLUTION INTRAVENOUS at 03:52

## 2025-05-06 RX ADMIN — ACETAMINOPHEN 975 MG: 325 TABLET, FILM COATED ORAL at 04:43

## 2025-05-06 RX ADMIN — ACETAMINOPHEN 975 MG: 325 TABLET, FILM COATED ORAL at 21:13

## 2025-05-06 RX ADMIN — IOHEXOL 100 ML: 350 INJECTION, SOLUTION INTRAVENOUS at 07:53

## 2025-05-06 RX ADMIN — CHLORHEXIDINE GLUCONATE 15 ML: 1.2 SOLUTION ORAL at 21:14

## 2025-05-06 NOTE — PHYSICAL THERAPY NOTE
PHYSICAL THERAPY TREATMENT NOTE          Patient Name: Monica Avitia  Today's Date: 2025          AGE:   80 y.o.  Mrn:   [id number]  ADMIT DX:  Hypokalemia [E87.6]  Hyponatremia [E87.1]  SBO (small bowel obstruction) (HCC) [K56.609]  Right inguinal hernia [K40.90]  Incarcerated inguinal hernia [K40.30]    Past Medical History:  Past Medical History:   Diagnosis Date    Anemia 2014    Transfused  3  units  of  blood    Anxiety     resolved: Oct 23, 2017    Cancer (HCC)     Colon cancer (HCC)     Disease of thyroid gland     hypothyroidism    GERD (gastroesophageal reflux disease)     History of Helicobacter infection     onset:     Hypertension     Obesity           25 0929   PT Last Visit   PT Visit Date 25   Note Type   Note Type Treatment   Pain Assessment   Pain Assessment Tool 0-10   Pain Score No Pain   Restrictions/Precautions   Weight Bearing Precautions Per Order No   Other Precautions Chair Alarm;Bed Alarm;Fall Risk;Telemetry   General   Chart Reviewed Yes   Family/Caregiver Present No   Cognition   Overall Cognitive Status WFL   Arousal/Participation Alert;Cooperative   Attention Within functional limits   Orientation Level Oriented X4   Memory Within functional limits   Following Commands Follows multistep commands without difficulty   Comments Pt ID via name and ; pt agreeable to PT tx and mobility, very pleasant and motivated to participate   Bed Mobility   Additional Comments pt sitting OOB on commode w/ RNs present upon arrival to room,   Transfers   Sit to Stand 5  Supervision   Additional items Assist x 1;Armrests;Increased time required;Verbal cues   Stand to Sit 5  Supervision   Additional items Assist x 1;Armrests;Increased time required;Verbal cues   Toilet transfer 5  Supervision   Additional items Assist x 1;Commode;Verbal cues;Armrests;Increased time required   Ambulation/Elevation   Gait pattern Decreased  foot clearance;Short stride;Excessively slow;Decreased hip extension;Decreased heel strike   Gait Assistance 5  Supervision   Additional items Assist x 1   Assistive Device Rolling walker   Distance 4'+70'+20'  (4'+5' w/o AD (pt reaching for external UE support on bedrail w/ RW introduced))   Stair Management Assistance 5  Supervision  (min Ax1 on 1st step, progressing to supervision)   Additional items Assist x 1   Stair Management Technique Two rails;Step to pattern  (pt confirmed bilateral railings on WALLACE)   Number of Stairs 6  (practice wooden step w/in room due to ICU lines)   Ambulation/Elevation Additional Comments pt demonstrated ability to negotiate multiple turns and obstacles w/ RW   Balance   Static Sitting Good   Dynamic Sitting Fair +   Static Standing Fair +   Dynamic Standing Fair   Ambulatory Fair  (w/ RW)   Activity Tolerance   Activity Tolerance Patient tolerated treatment well   Medical Staff Made Aware SHARAD Corado; TAL Stiles   Nurse Made Aware PAGE Jacob   Assessment   Prognosis Good   Problem List Decreased endurance;Impaired balance;Decreased skin integrity   Assessment PT treatment provided today to address deficits of: impaired balance, gait deviations, and decreased overall activity tolerance. Interventions provided include: transfer, balance, endurance, and gait training in order to challenge pt' activity tolerance, promote progress towards her baseline mobility, and to maximize pt independence w/ functional mobility during current admission. Compared to previous session, pt w/ overall improvement in activity tolerance w/ decreased physical assistance to perform all aspects of functional mobility. Pt demonstrated ability to perform multiple transfer and gait trials as well as negotiate 6 steps w/ supervision level of assistance. Pt will continue benefit from PT to promote independence w/ functional mobility, address mobility deficits, and progress towards set goals. Recommend DC w/ level: III  "(Minimum Rehab Resource Intensity) when medically cleared.   Goals   Patient Goals to go home today   STG Expiration Date 05/12/25   Short Term Goal #1 Pt will: perform bed mobility w/ mod I to decrease pt's burden of care and increase pt's independence w/ repositioning in bed; perform transfers w/ mod I to promote OOB mobility; ambulate 150' w/ LRAD and mod I to increase pt's ambulatory endurance/tolerance; negotiate 6 stair(s) w/ UE support and mod I to facilitate pt returning to previous living environment; increase all balance ratings by at least 1 grade to decrease pt's risk of falls   PT Treatment Day 2   Plan   Treatment/Interventions Functional transfer training;LE strengthening/ROM;Elevations;Therapeutic exercise;Endurance training;Patient/family training;Bed mobility;Equipment eval/education;Gait training;Compensatory technique education   Progress Progressing toward goals   PT Frequency 3-5x/wk   Discharge Recommendation   Rehab Resource Intensity Level, PT III (Minimum Resource Intensity)   Equipment Recommended Walker   Walker Package Recommended Wheeled walker   Change/add to Walker Package? Yes, Change Size   Walker Size Michael (Ht <5'1\")   Additional Comments Pt reports she will have family support (sister-in-law and ) available to assist upon DC   AM-PAC Basic Mobility Inpatient   Turning in Flat Bed Without Bedrails 4   Lying on Back to Sitting on Edge of Flat Bed Without Bedrails 3   Moving Bed to Chair 3   Standing Up From Chair Using Arms 3   Walk in Room 3   Climb 3-5 Stairs With Railing 3   Basic Mobility Inpatient Raw Score 19   Basic Mobility Standardized Score 42.48   [facility] Highest Level Of Mobility   -HLM Goal 6: Walk 10 steps or more   -HLM Achieved 7: Walk 25 feet or more   Education   Education Provided Mobility training;Assistive device   Patient Demonstrates acceptance/verbal understanding   End of Consult   Patient Position at End of Consult Bedside chair;Bed/Chair " alarm activated;All needs within reach       The patient's AM-PAC Basic Mobility Inpatient Short Form Raw Score is 19. A Raw score of greater than 16 suggests the patient may benefit from discharge to home. Please also refer to the recommendation of the Physical Therapist for safe discharge planning.    Pt will continue to benefit from skilled inpatient PT during this admission in order to facilitate progress towards goals and to maximize pt's independence w/ functional mobility.    DC rec: level III (Minimum Rehab Resource Intensity)      Calista Pham, PT, DPT  05/06/25

## 2025-05-06 NOTE — PLAN OF CARE
Problem: PHYSICAL THERAPY ADULT  Goal: Performs mobility at highest level of function for planned discharge setting.  See evaluation for individualized goals.  Description: Treatment/Interventions: Functional transfer training, LE strengthening/ROM, Elevations, Therapeutic exercise, Endurance training, Equipment eval/education, Patient/family training, Bed mobility, Gait training, Compensatory technique education          See flowsheet documentation for full assessment, interventions and recommendations.  Outcome: Progressing  Note: Prognosis: Good  Problem List: Decreased endurance, Impaired balance, Decreased skin integrity  Assessment: PT treatment provided today to address deficits of: impaired balance, gait deviations, and decreased overall activity tolerance. Interventions provided include: transfer, balance, endurance, and gait training in order to challenge pt' activity tolerance, promote progress towards her baseline mobility, and to maximize pt independence w/ functional mobility during current admission. Compared to previous session, pt w/ overall improvement in activity tolerance w/ decreased physical assistance to perform all aspects of functional mobility. Pt demonstrated ability to perform multiple transfer and gait trials as well as negotiate 6 steps w/ supervision level of assistance. Pt will continue benefit from PT to promote independence w/ functional mobility, address mobility deficits, and progress towards set goals. Recommend DC w/ level: III (Minimum Rehab Resource Intensity) when medically cleared.        Rehab Resource Intensity Level, PT: III (Minimum Resource Intensity)    See flowsheet documentation for full assessment.

## 2025-05-06 NOTE — CASE MANAGEMENT
Case Management Progress Note    Patient name Monica Avitia  Location ICU 02/ICU 02 MRN [id number]  : 1945 Date 2025       LOS (days): 4  Geometric Mean LOS (GMLOS) (days): 9.6  Days to GMLOS:5.1        OBJECTIVE:        Current admission status: Inpatient  Preferred Pharmacy:   [facility] [address] [place] [id number]  Phone: [phone] Fax: [phone]    Primary Care Provider: Jeimy Torres MD    Primary Insurance: [employer]  Secondary Insurance:     PROGRESS NOTE:      CM notified by PT today that they are recommending home with home therapy after pt was seen today. [facility] set up for pt. CM to f/u with any additional needs prior to dc.

## 2025-05-06 NOTE — PLAN OF CARE
Problem: PAIN - ADULT  Goal: Verbalizes/displays adequate comfort level or baseline comfort level  Description: Interventions:- Encourage patient to monitor pain and request assistance- Assess pain using appropriate pain scale- Administer analgesics based on type and severity of pain and evaluate response- Implement non-pharmacological measures as appropriate and evaluate response- Consider cultural and social influences on pain and pain management- Notify physician/advanced practitioner if interventions unsuccessful or patient reports new pain  Outcome: Progressing     Problem: INFECTION - ADULT  Goal: Absence or prevention of progression during hospitalization  Description: INTERVENTIONS:- Assess and monitor for signs and symptoms of infection- Monitor lab/diagnostic results- Monitor all insertion sites, i.e. indwelling lines, tubes, and drains- Monitor endotracheal if appropriate and nasal secretions for changes in amount and color- [place] appropriate cooling/warming therapies per order- Administer medications as ordered- Instruct and encourage patient and family to use good hand hygiene technique- Identify and instruct in appropriate isolation precautions for identified infection/condition  Outcome: Progressing  Goal: Absence of fever/infection during neutropenic period  Description: INTERVENTIONS:- Monitor WBC  Outcome: Progressing     Problem: SAFETY ADULT  Goal: Patient will remain free of falls  Description: INTERVENTIONS:- Educate patient/family on patient safety including physical limitations- Instruct patient to call for assistance with activity - Consult OT/PT to assist with strengthening/mobility - Keep Call bell within reach- Keep bed low and locked with side rails adjusted as appropriate- Keep care items and personal belongings within reach- Initiate and maintain comfort rounds- Make Fall Risk Sign visible to staff- Offer Toileting - Apply yellow socks and bracelet for high fall risk patients-  Consider moving patient to room near nurses station  Outcome: Progressing  Goal: Maintain or return to baseline ADL function  Description: INTERVENTIONS:-  Assess patient's ability to carry out ADLs; assess patient's baseline for ADL function and identify physical deficits which impact ability to perform ADLs (bathing, care of mouth/teeth, toileting, grooming, dressing, etc.)- Assess/evaluate cause of self-care deficits - Assess range of motion- Assess patient's mobility; develop plan if impaired- Assess patient's need for assistive devices and provide as appropriate- Encourage maximum independence but intervene and supervise when necessary- Involve family in performance of ADLs- Assess for home care needs following discharge - Consider OT consult to assist with ADL evaluation and planning for discharge- Provide patient education as appropriate  Outcome: Progressing  Goal: Maintains/Returns to pre admission functional level  Description: INTERVENTIONS:- Perform AM-PAC 6 Click Basic Mobility/ Daily Activity assessment daily.- Set and communicate daily mobility goal to care team and patient/family/caregiver. - Collaborate with rehabilitation services on mobility goals if consulted- Perform Range of Motion - Reposition patient .- Dangle patient - Stand patient - Ambulate patient - Out of bed to chair- Out of bed for meals - Out of bed for toileting- Record patient progress and toleration of activity level   Outcome: Progressing

## 2025-05-07 ENCOUNTER — APPOINTMENT (INPATIENT)
Dept: RADIOLOGY | Facility: HOSPITAL | Age: 80
DRG: 329 | End: 2025-05-07
Payer: COMMERCIAL

## 2025-05-07 PROBLEM — D72.829 LEUKOCYTOSIS: Status: ACTIVE | Noted: 2025-05-07

## 2025-05-07 PROBLEM — N32.89 BLADDER DISTENSION: Status: ACTIVE | Noted: 2025-05-07

## 2025-05-07 LAB
ANION GAP SERPL CALCULATED.3IONS-SCNC: 6 MMOL/L (ref 4–13)
ANISOCYTOSIS BLD QL SMEAR: PRESENT
BACTERIA BLD CULT: NORMAL
BACTERIA BLD CULT: NORMAL
BASOPHILS # BLD MANUAL: 0 THOUSAND/UL (ref 0–0.1)
BASOPHILS NFR MAR MANUAL: 0 % (ref 0–1)
BUN SERPL-MCNC: 29 MG/DL (ref 5–25)
CALCIUM SERPL-MCNC: 7.7 MG/DL (ref 8.4–10.2)
CHLORIDE SERPL-SCNC: 103 MMOL/L (ref 96–108)
CO2 SERPL-SCNC: 24 MMOL/L (ref 21–32)
CREAT SERPL-MCNC: 0.53 MG/DL (ref 0.6–1.3)
EOSINOPHIL # BLD MANUAL: 0 THOUSAND/UL (ref 0–0.4)
EOSINOPHIL NFR BLD MANUAL: 0 % (ref 0–6)
ERYTHROCYTE [DISTWIDTH] IN BLOOD BY AUTOMATED COUNT: 15.2 % (ref 11.6–15.1)
GFR SERPL CREATININE-BSD FRML MDRD: 89 ML/MIN/1.73SQ M
GLUCOSE SERPL-MCNC: 87 MG/DL (ref 65–140)
HCT VFR BLD AUTO: 22.2 % (ref 34.8–46.1)
HGB BLD-MCNC: 7.1 G/DL (ref 11.5–15.4)
LYMPHOCYTES # BLD AUTO: 3.03 THOUSAND/UL (ref 0.6–4.47)
LYMPHOCYTES # BLD AUTO: 9 % (ref 14–44)
MCH RBC QN AUTO: 28.6 PG (ref 26.8–34.3)
MCHC RBC AUTO-ENTMCNC: 32 G/DL (ref 31.4–37.4)
MCV RBC AUTO: 90 FL (ref 82–98)
MONOCYTES # BLD AUTO: 0.67 THOUSAND/UL (ref 0–1.22)
MONOCYTES NFR BLD: 2 % (ref 4–12)
NEUTROPHILS # BLD MANUAL: 29.92 THOUSAND/UL (ref 1.85–7.62)
NEUTS SEG NFR BLD AUTO: 89 % (ref 43–75)
PLATELET # BLD AUTO: 340 THOUSANDS/UL (ref 149–390)
PLATELET BLD QL SMEAR: ADEQUATE
PMV BLD AUTO: 9.2 FL (ref 8.9–12.7)
POTASSIUM SERPL-SCNC: 3.5 MMOL/L (ref 3.5–5.3)
RBC # BLD AUTO: 2.48 MILLION/UL (ref 3.81–5.12)
RBC MORPH BLD: PRESENT
SODIUM SERPL-SCNC: 133 MMOL/L (ref 135–147)
WBC # BLD AUTO: 33.62 THOUSAND/UL (ref 4.31–10.16)

## 2025-05-07 PROCEDURE — NC001 PR NO CHARGE: Performed by: STUDENT IN AN ORGANIZED HEALTH CARE EDUCATION/TRAINING PROGRAM

## 2025-05-07 PROCEDURE — 87493 C DIFF AMPLIFIED PROBE: CPT

## 2025-05-07 PROCEDURE — 80048 BASIC METABOLIC PNL TOTAL CA: CPT

## 2025-05-07 PROCEDURE — 99222 1ST HOSP IP/OBS MODERATE 55: CPT | Performed by: INTERNAL MEDICINE

## 2025-05-07 PROCEDURE — 99024 POSTOP FOLLOW-UP VISIT: CPT | Performed by: SURGERY

## 2025-05-07 PROCEDURE — 85027 COMPLETE CBC AUTOMATED: CPT

## 2025-05-07 PROCEDURE — G0545 PR INHERENT VISIT TO INPT: HCPCS | Performed by: INTERNAL MEDICINE

## 2025-05-07 PROCEDURE — 85007 BL SMEAR W/DIFF WBC COUNT: CPT

## 2025-05-07 PROCEDURE — 76705 ECHO EXAM OF ABDOMEN: CPT | Performed by: STUDENT IN AN ORGANIZED HEALTH CARE EDUCATION/TRAINING PROGRAM

## 2025-05-07 RX ORDER — POTASSIUM CHLORIDE 1500 MG/1
40 TABLET, EXTENDED RELEASE ORAL ONCE
Status: COMPLETED | OUTPATIENT
Start: 2025-05-07 | End: 2025-05-07

## 2025-05-07 RX ADMIN — ACETAMINOPHEN 975 MG: 325 TABLET, FILM COATED ORAL at 06:07

## 2025-05-07 RX ADMIN — PANTOPRAZOLE SODIUM 40 MG: 40 INJECTION, POWDER, LYOPHILIZED, FOR SOLUTION INTRAVENOUS at 08:03

## 2025-05-07 RX ADMIN — CHLORHEXIDINE GLUCONATE 15 ML: 1.2 SOLUTION ORAL at 08:03

## 2025-05-07 RX ADMIN — HEPARIN SODIUM 5000 UNITS: 5000 INJECTION INTRAVENOUS; SUBCUTANEOUS at 06:07

## 2025-05-07 RX ADMIN — CHLORHEXIDINE GLUCONATE 15 ML: 1.2 SOLUTION ORAL at 20:48

## 2025-05-07 RX ADMIN — ATORVASTATIN CALCIUM 10 MG: 10 TABLET, FILM COATED ORAL at 08:03

## 2025-05-07 RX ADMIN — Medication 2 TABLET: at 08:03

## 2025-05-07 RX ADMIN — HEPARIN SODIUM 5000 UNITS: 5000 INJECTION INTRAVENOUS; SUBCUTANEOUS at 20:49

## 2025-05-07 RX ADMIN — ACETAMINOPHEN 975 MG: 325 TABLET, FILM COATED ORAL at 20:49

## 2025-05-07 RX ADMIN — AMLODIPINE BESYLATE 2.5 MG: 2.5 TABLET ORAL at 08:04

## 2025-05-07 RX ADMIN — POTASSIUM CHLORIDE 40 MEQ: 1500 TABLET, EXTENDED RELEASE ORAL at 08:03

## 2025-05-07 RX ADMIN — LEVOTHYROXINE SODIUM 75 MCG: 75 TABLET ORAL at 06:07

## 2025-05-07 RX ADMIN — PANTOPRAZOLE SODIUM 40 MG: 40 INJECTION, POWDER, LYOPHILIZED, FOR SOLUTION INTRAVENOUS at 20:49

## 2025-05-07 RX ADMIN — HEPARIN SODIUM 5000 UNITS: 5000 INJECTION INTRAVENOUS; SUBCUTANEOUS at 14:55

## 2025-05-07 RX ADMIN — HYDROCHLOROTHIAZIDE 25 MG: 25 TABLET ORAL at 08:03

## 2025-05-07 NOTE — BRIEF OP NOTE (RAD/CATH)
INTERVENTIONAL RADIOLOGY PROCEDURE NOTE    Date: 5/7/2025    Procedure:   Procedure Summary       Date:  Room / Location:     Anesthesia Start:  Anesthesia Stop:     Procedure:  Diagnosis:     Scheduled Providers:  Responsible Provider:     Anesthesia Type: Not recorded ASA Status: Not recorded            Preoperative diagnosis:   1. SBO (small bowel obstruction) (HCC)    2. Right inguinal hernia    3. Incarcerated inguinal hernia    4. Hyponatremia    5. Hypokalemia         Postoperative diagnosis: Same.    Surgeon: Darvin Smith MD     Assistant: None. No qualified resident was available.    Blood loss: None    Specimens: None     Findings:   Trace perihepatic free fluid.  No other free fluid visualized on US evaluation of the abdomen.    Markedly distended urinary bladder with no free fluid noted posterior to it in the deep pelvis.  Bladder distention appears similar to that on CT yesterday; estimated volume based on that CT is 750 mL.  Recommended to patient that she urinate on return from IR, and discussed with primary team that bladder scan/catheterization should be considered.    No aspiration/drainage attempted given lack of fluid.    Complications: None immediate.    Anesthesia: none

## 2025-05-07 NOTE — ASSESSMENT & PLAN NOTE
IR evaluation noted with concern for markedly distended urinary bladder.  Recent UA was negative.   Urinary retention protocol

## 2025-05-07 NOTE — PLAN OF CARE
Problem: PAIN - ADULT  Goal: Verbalizes/displays adequate comfort level or baseline comfort level  Description: Interventions:- Encourage patient to monitor pain and request assistance- Assess pain using appropriate pain scale- Administer analgesics based on type and severity of pain and evaluate response- Implement non-pharmacological measures as appropriate and evaluate response- Consider cultural and social influences on pain and pain management- Notify physician/advanced practitioner if interventions unsuccessful or patient reports new pain  Outcome: Progressing     Problem: INFECTION - ADULT  Goal: Absence or prevention of progression during hospitalization  Description: INTERVENTIONS:- Assess and monitor for signs and symptoms of infection- Monitor lab/diagnostic results- Monitor all insertion sites, i.e. indwelling lines, tubes, and drains- Monitor endotracheal if appropriate and nasal secretions for changes in amount and color- [place] appropriate cooling/warming therapies per order- Administer medications as ordered- Instruct and encourage patient and family to use good hand hygiene technique- Identify and instruct in appropriate isolation precautions for identified infection/condition  Outcome: Progressing

## 2025-05-07 NOTE — PLAN OF CARE
Problem: PAIN - ADULT  Goal: Verbalizes/displays adequate comfort level or baseline comfort level  Description: Interventions:- Encourage patient to monitor pain and request assistance- Assess pain using appropriate pain scale- Administer analgesics based on type and severity of pain and evaluate response- Implement non-pharmacological measures as appropriate and evaluate response- Consider cultural and social influences on pain and pain management- Notify physician/advanced practitioner if interventions unsuccessful or patient reports new pain  Outcome: Progressing     Problem: INFECTION - ADULT  Goal: Absence or prevention of progression during hospitalization  Description: INTERVENTIONS:- Assess and monitor for signs and symptoms of infection- Monitor lab/diagnostic results- Monitor all insertion sites, i.e. indwelling lines, tubes, and drains- Monitor endotracheal if appropriate and nasal secretions for changes in amount and color- [place] appropriate cooling/warming therapies per order- Administer medications as ordered- Instruct and encourage patient and family to use good hand hygiene technique- Identify and instruct in appropriate isolation precautions for identified infection/condition  Outcome: Progressing  Goal: Absence of fever/infection during neutropenic period  Description: INTERVENTIONS:- Monitor WBC  Outcome: Progressing     Problem: SAFETY ADULT  Goal: Patient will remain free of falls  Description: INTERVENTIONS:- Educate patient/family on patient safety including physical limitations- Instruct patient to call for assistance with activity - Consult OT/PT to assist with strengthening/mobility - Keep Call bell within reach- Keep bed low and locked with side rails adjusted as appropriate- Keep care items and personal belongings within reach- Initiate and maintain comfort rounds- Make Fall Risk Sign visible to staff- Offer Toileting every  Hours, in advance of need- Initiate/Maintain alarm- Obtain  necessary fall risk management equipment: - Apply yellow socks and bracelet for high fall risk patients- Consider moving patient to room near nurses station  Outcome: Progressing  Goal: Maintain or return to baseline ADL function  Description: INTERVENTIONS:-  Assess patient's ability to carry out ADLs; assess patient's baseline for ADL function and identify physical deficits which impact ability to perform ADLs (bathing, care of mouth/teeth, toileting, grooming, dressing, etc.)- Assess/evaluate cause of self-care deficits - Assess range of motion- Assess patient's mobility; develop plan if impaired- Assess patient's need for assistive devices and provide as appropriate- Encourage maximum independence but intervene and supervise when necessary- Involve family in performance of ADLs- Assess for home care needs following discharge - Consider OT consult to assist with ADL evaluation and planning for discharge- Provide patient education as appropriate  Outcome: Progressing  Goal: Maintains/Returns to pre admission functional level  Description: INTERVENTIONS:- Perform AM-PAC 6 Click Basic Mobility/ Daily Activity assessment daily.- Set and communicate daily mobility goal to care team and patient/family/caregiver. - Collaborate with rehabilitation services on mobility goals if consulted- Perform Range of Motion  times a day.- Reposition patient every  hours.- Dangle patient  times a day- Stand patient  times a day- Ambulate patient  times a day- Out of bed to chair  times a day - Out of bed for meals  times a day- Out of bed for toileting- Record patient progress and toleration of activity level   Outcome: Progressing     Problem: DISCHARGE PLANNING  Goal: Discharge to home or other facility with appropriate resources  Description: INTERVENTIONS:- Identify barriers to discharge w/patient and caregiver- Arrange for needed discharge resources and transportation as appropriate- Identify discharge learning needs (meds, wound  care, etc.)- Arrange for interpretive services to assist at discharge as needed- Refer to Case Management Department for coordinating discharge planning if the patient needs post-hospital services based on physician/advanced practitioner order or complex needs related to functional status, cognitive ability, or social support system  Outcome: Progressing     Problem: Knowledge Deficit  Goal: Patient/family/caregiver demonstrates understanding of disease process, treatment plan, medications, and discharge instructions  Description: Complete learning assessment and assess knowledge base.Interventions:- Provide teaching at level of understanding- Provide teaching via preferred learning methods  Outcome: Progressing     Problem: Prexisting or High Potential for Compromised Skin Integrity  Goal: Skin integrity is maintained or improved  Description: INTERVENTIONS:- Identify patients at risk for skin breakdown- Assess and monitor skin integrity- Assess and monitor nutrition and hydration status- Monitor labs - Assess for incontinence - Turn and reposition patient- Assist with mobility/ambulation- Relieve pressure over bony prominences- Avoid friction and shearing- Provide appropriate hygiene as needed including keeping skin clean and dry- Evaluate need for skin moisturizer/barrier cream- Collaborate with interdisciplinary team - Patient/family teaching- Consider wound care consult   Outcome: Progressing

## 2025-05-07 NOTE — ASSESSMENT & PLAN NOTE
Postoperative leukocytosis has been persistent and now up to 33.  No associated fevers.  Follow-up CT A/P with nonspecific findings which may be postoperative in etiology, without visible drainable collection.  IR input noted with original plan for paracentesis, however insufficient fluid present.  Patient has no peritoneal signs.  No evidence of surgical site infection.  No clinical or radiographic evidence to suggest pneumonia.  Negative UA. Patient otherwise remains afebrile and clinically stable after completion of appropriate postoperative antibiotic course.  More likely leukemoid reaction in the setting of recent surgery, significant postoperative anemia, possible intermittent urinary retention.  Patient did have a large bowel movement today but does not appear to have active diarrhea.  Overall low suspicion for C. difficile but will continue to monitor closely.   Continue close observation off any more antibiotics as patient has received sufficient postoperative course.   Continue to follow stool output closely   Serial abdominal exams   Urinary retention protocol   Follow hemoglobin closely   Surgery follow-up ongoing   Follow temperatures and hemodynamics   Recheck CBC in AM

## 2025-05-07 NOTE — ASSESSMENT & PLAN NOTE
-with incarcerated femoral hernia containing bowel, status post open hernia repair with small bowel resection 5/2  -rapid response called 5/2 for syncopal episode while having bowel movement, upgrade to ICU for close monitoring  -5/2 1 unit PRBCs transfused    Afebrile, vital signs stable within normal limits on room air  WBC 33 from 33    Plan  - NPO  - monitoring off abx  - Multimodal pain regimen  - Encourage ambulation/out of bed, 3 times daily  - Encourage incentive spirometer use, 10 times per hour  - Strict I's and O's  - Aggressive pulmonary toilet, incentive spirometry  - DVT prophylaxis

## 2025-05-07 NOTE — CONSULTS
e-Consult ([facility])  - Interventional Radiology  Monica Avitia 80 y.o. female MRN: [id number]  Unit/Bed#: W -01 Encounter: [id number]          Interventional Radiology has been consulted to evaluate Monica Avitia    We were consulted by General Surgery concerning this 80-year-old woman who presented with an incarcerated/strangulated right femoral hernia, now status post open hernia repair with small bowel resection on 5/2/25.  She is clinically doing well but has ongoing leukocytosis to 33 prompting repeat imaging.      There is concern for extraluminal gas in the region of the anastomosis, and a few sites of ascites with adjacent peritoneal enhancement (right paracolic gutter, deep pelvis) were noted on the CT.    We were consulted for consideration of aspiration/drainage of the fluid.    Inpatient Consult to IR  Consult performed by: Darvin Smith MD  Consult ordered by: Shawn Garvin MD        05/07/25    Assessment/Recommendation:   The right paracolic gutter fluid is likely accessible for percutaneous aspiration/drainage.  The deep pelvic fluid is inaccessible.    Per discussion with surgery team, will plan for paracentesis of the paracolic gutter fluid.  If purulent appearing, a drain may be considered.    Timing dependent on IR availability.     11-20 minutes, >50% of the total time devoted to medical consultative verbal/EMR discussion between providers. Written report will be generated in the EMR.     Thank you for allowing Interventional Radiology to participate in the care of Monica Avitia. Please don't hesitate to call or TigerText us with any questions.     Darvin Smith MD

## 2025-05-07 NOTE — CONSULTS
Consultation - Infectious Disease   Name: Monica Avitia 80 y.o. female I MRN: [id number]  Unit/Bed#: W -01 I Date of Admission: 5/1/2025   Date of Service: 5/7/2025 I Hospital Day: 5   Inpatient consult to Infectious Diseases  Consult performed by: Lashonda Dyer DO  Consult ordered by: Levi Verdugo MD        Physician Requesting Evaluation: Aniceto Coleman DO   Reason for Evaluation / Principal Problem: Postoperative leukocytosis    Assessment & Plan  Leukocytosis  Postoperative leukocytosis has been persistent and now up to 33.  No associated fevers.  Follow-up CT A/P with nonspecific findings which may be postoperative in etiology, without visible drainable collection.  IR input noted with original plan for paracentesis, however insufficient fluid present.  Patient has no peritoneal signs.  No evidence of surgical site infection.  No clinical or radiographic evidence to suggest pneumonia.  Negative UA. Patient otherwise remains afebrile and clinically stable after completion of appropriate postoperative antibiotic course.  More likely leukemoid reaction in the setting of recent surgery, significant postoperative anemia, possible intermittent urinary retention.  Patient did have a large bowel movement today but does not appear to have active diarrhea.  Overall low suspicion for C. difficile but will continue to monitor closely.   Continue close observation off any more antibiotics as patient has received sufficient postoperative course.   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  CT showed SBO secondary to strangulated right groin hernia.  Status post ex lap with repair of strangulated femoral  hernia and SBR on 5/2.  Small area of necrotic small bowel was noted intraoperatively.  Patient received 5-day course of ceftriaxone/Flagyl.   Management per surgery service  Bladder distension  IR evaluation noted with concern for markedly distended urinary bladder.  Recent UA was negative.   Urinary retention protocol      Antibiotics:  Off antibiotic D1    I personally reviewed medical records.  Thank you for the consultation.  Will continue to follow the patient.    History of Present Illness   Monica Avitia is a 80 y.o. year old female with prior colon cancer post right hemicolectomy who presented on 5/2 with complaint of abdominal pain, nausea and vomiting.  CT was concerning for small bowel obstruction secondary to entrapment of bowel loop within a right groin hernia.  Patient were not emergently to OR for small bowel resection and repair of strangulated femoral hernia.  Small area of necrotic small bowel was noted intraoperatively.  Patient has been receiving ceftriaxone and Flagyl postoperatively.  Patient was initially managed in ICU postoperatively but has since been transferred out.  She is feeling well with no worsening abdominal pain.  She has been tolerating oral intake.  Postoperatively, patient noted to have rising WBC count up to 33 for the past 2 days prompting ID consultation.  She went for CT yesterday which noted air at anastomosis and low volume ascites with no visible drainable collection.  IR evaluated patient today with not enough free fluid to perform paracentesis.  Urinary bladder was noted to be markedly distended.  Patient states she did urinate significantly after procedure.  No active urinary symptoms.  She also just had a big bowel movement which was black mixed with red, loose but not watery.    A complete review of systems is negative other than that noted in the HPI.    Historical Information   Past Medical History:   Diagnosis Date    Anemia 06/12/2014    Transfused  3   units  of  blood    Anxiety     resolved: Oct 23, 2017    Cancer (HCC)     Colon cancer (HCC)     Disease of thyroid gland     hypothyroidism    GERD (gastroesophageal reflux disease)     History of Helicobacter infection     onset: 6/14    Hypertension     Obesity      Past Surgical History:   Procedure Laterality Date    BREAST SURGERY      COLECTOMY      Partial     COLON SURGERY      right hemicolectomy 2014    COLON SURGERY      COLONOSCOPY W/ POLYPECTOMY      ESOPHAGOGASTRODUODENOSCOPY      Diagnostic - 6/14 in \[facility] [place] [facility]\""     HERNIA REPAIR      right inguinal hernia repair 1989    HERNIA REPAIR Right 5/2/2025    Procedure: REPAIR STRANGULATED FEMORAL HERNIA, SMALL BOWEL RESECTION;  Surgeon: Levi Verdugo MD;  Location: [facility];  Service: General    CT COLONOSCOPY FLX DX W/COLLJ SPEC WHEN PFRMD N/A 01/11/2016    Procedure: COLONOSCOPY;  Surgeon: Flakito Molina MD;  Location: [facility];  Service: Gastroenterology    CT COLONOSCOPY FLX DX W/COLLJ SPEC WHEN PFRMD N/A 03/11/2019    Procedure: COLONOSCOPY;  Surgeon: Flakito Molina MD;  Location: [facility];  Service: Gastroenterology    TONSILLECTOMY      as a child     Social History     Tobacco Use    Smoking status: Never    Smokeless tobacco: Never   Vaping Use    Vaping status: Never Used   Substance and Sexual Activity    Alcohol use: Never    Drug use: No    Sexual activity: Not Currently     Birth control/protection: Abstinence     E-Cigarette/Vaping    E-Cigarette Use Never User      E-Cigarette/Vaping Substances    Nicotine No     THC No     CBD No     Flavoring No     Other No     Unknown No        Social History     Tobacco Use    Smoking status: Never    Smokeless tobacco: Never   Vaping Use    Vaping status: Never Used   Substance and Sexual Activity    Alcohol use: Never    Drug use: No    Sexual activity: Not Currently     Birth control/protection: Abstinence       Current Facility-Administered Medications:     acetaminophen (TYLENOL) tablet 975  mg, Q8H ALEXANDRA    amLODIPine (NORVASC) tablet 2.5 mg, QAM    atorvastatin (LIPITOR) tablet 10 mg, Daily    calcium carbonate-vitamin D 500 mg-5 mcg tablet 2 tablet, Daily    chlorhexidine (PERIDEX) 0.12 % oral rinse 15 mL, Q12H ALEXANDRA    heparin (porcine) subcutaneous injection 5,000 Units, Q8H ALEXANDRA    hydroCHLOROthiazide tablet 25 mg, QAM    HYDROmorphone (DILAUDID) injection 0.5 mg, Q4H PRN    labetalol (NORMODYNE) injection 10 mg, Q6H PRN    levothyroxine tablet 75 mcg, Early Morning    ondansetron (ZOFRAN) injection 4 mg, Q6H PRN    oxyCODONE (ROXICODONE) IR tablet 5 mg, Q4H PRN    oxyCODONE (ROXICODONE) split tablet 2.5 mg, Q4H PRN    pantoprazole (PROTONIX) injection 40 mg, Q12H ALEXANDRA    [Transfer Hold] senna-docusate sodium (SENOKOT S) 8.6-50 mg per tablet 2 tablet, HS  Prior to Admission Medications   Prescriptions Last Dose Informant Patient Reported? Taking?   Calcium Carb-Cholecalciferol 1000-800 MG-UNIT TABS Past Week Self Yes Yes   Sig: Take 1 tablet by mouth daily   Garlic 1000 MG CAPS Past Week Self Yes Yes   Sig: Take 1,000 mg by mouth daily.   Loratadine 10 MG CAPS 4/30/2025 Morning Self No Yes   Sig: Take 1 capsule (10 mg total) by mouth in the morning   Multiple Vitamins-Minerals (MULTIVITAMIN WITH MINERALS) tablet Past Week Self Yes Yes   Sig: Take 1 tablet by mouth daily.   amLODIPine (NORVASC) 2.5 mg tablet 5/1/2025 at  7:00 AM  No Yes   Sig: Take 1 tablet (2.5 mg total) by mouth every morning   atorvastatin (LIPITOR) 10 mg tablet 4/30/2025 Evening  No Yes   Sig: Take 1 tablet (10 mg total) by mouth daily   hydroCHLOROthiazide 25 mg tablet 4/30/2025 Morning  No Yes   Sig: Take 1 tablet (25 mg total) by mouth every morning   levothyroxine 75 mcg tablet 5/1/2025 Morning Self No Yes   Sig: Take 1 tablet (75 mcg total) by mouth every morning      Facility-Administered Medications: None     Patient has no known allergies.    Objective :  Temp:  [97.9 °F (36.6 °C)-98.2 °F (36.8 °C)] 98.2 °F (36.8 °C)  HR:   [] 105  BP: ()/(53-66) 119/56  Resp:  [17] 17  SpO2:  [90 %-98 %] 97 %  O2 Device: None (Room air)  FiO2 (%):  [66] 66    General:  Well-nourished, well-developed, in no acute distress  Eyes:  Conjunctive clear with no hemorrhages or effusions  Oropharynx:  No ulcers, no lesions  Neck:  Supple, no lymphadenopathy  Lungs:  Clear to auscultation bilaterally, no accessory muscle use  Cardiac:  Regular rate and rhythm, no murmurs  Abdomen:  Soft, non-tender, non-distented  Extremities:  No peripheral cyanosis, clubbing, or edema  Skin:  No rashes, no ulcers  Neurological:  Moves all four extremities spontaneously, sensation grossly intact      Lab Results: I have reviewed the following results:  Results from last 7 days   Lab Units 05/07/25  0604 05/06/25  0406 05/05/25  1140 05/05/25  0503   WBC Thousand/uL 33.62* 33.60*  --  22.81*   HEMOGLOBIN g/dL 7.1* 7.3* 8.1* 8.1*   PLATELETS Thousands/uL 340 296  --  263     Results from last 7 days   Lab Units 05/07/25  0604 05/06/25  0406 05/05/25  0503 05/04/25  1420 05/04/25  0515 05/03/25  0400 05/02/25  1951 05/02/25  0514 05/01/25  2142   SODIUM mmol/L 133* 134* 130*   < > 136   < > 132*   < > 129*   POTASSIUM mmol/L 3.5 4.0 4.1   < > 4.3   < > 3.8   < > 3.2*   CHLORIDE mmol/L 103 105 104   < > 108   < > 98   < > 89*   CO2 mmol/L 24 23 22   < > 20*   < > 25   < > 29   BUN mg/dL 29* 37* 44*   < > 42*   < > 26*   < > 12   CREATININE mg/dL 0.53* 0.62 0.84   < > 0.64   < > 0.89   < > 0.68   EGFR ml/min/1.73sq m 89 85 65   < > 84   < > 61   < > 82   CALCIUM mg/dL 7.7* 7.9* 7.2*   < > 7.7*   < > 7.8*   < > 9.6   AST U/L  --   --   --   --  14  --  15  --  23   ALT U/L  --   --   --   --  7  --  10  --  12   ALK PHOS U/L  --   --   --   --  30*  --  33*  --  51   ALBUMIN g/dL  --   --   --   --  2.9*  --  3.1*  --  4.5    < > = values in this interval not displayed.     Results from last 7 days   Lab Units 05/01/25  1735 05/01/25  8513   BLOOD CULTURE  No Growth  After 5 Days. No Growth After 5 Days.     Results from last 7 days   Lab Units 05/01/25  2142   PROCALCITONIN ng/ml <0.05                   Imaging Results Review: I personally reviewed the following image studies in PACS and associated radiology reports: CT abdomen/pelvis. My interpretation of the radiology images/reports is: Few foci of mesenteric gas adjacent to anastomotic site 1 elongated appearing contiguous with lumen concerning for possible forming sinus tract.  No drainable gas and fluid collection.  Partially loculated mild ascites with peritoneal enhancement.  Over distended gallbladder with sludge and stones..

## 2025-05-07 NOTE — ASSESSMENT & PLAN NOTE
Patient presented with acute onset abdominal pain, nausea and vomiting.  CT showed SBO secondary to strangulated right groin hernia.  Status post ex lap with repair of strangulated femoral hernia and SBR on 5/2.  Small area of necrotic small bowel was noted intraoperatively.  Patient received 5-day course of ceftriaxone/Flagyl.   Management per surgery service

## 2025-05-07 NOTE — SEDATION DOCUMENTATION
IR paracentesis not performed. Insufficient fluid seen on abdominal ultrasound by Dr. Smith. Patient educated and returned to room in stable condition.

## 2025-05-07 NOTE — PROGRESS NOTES
Progress Note - Surgery-General   Name: Monica Avitia 80 y.o. 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  -with incarcerated femoral hernia containing bowel, status post open hernia repair with small bowel resection 5/2  -rapid response called 5/2 for syncopal episode while having bowel movement, upgrade to ICU for close monitoring  -5/2 1 unit PRBCs transfused    Afebrile, vital signs stable within normal limits on room air  WBC 33 from 33    Plan  - NPO  - monitoring off abx  - Multimodal pain regimen  - Encourage ambulation/out of bed, 3 times daily  - Encourage incentive spirometer use, 10 times per hour  - Strict I's and O's  - Aggressive pulmonary toilet, incentive spirometry  - DVT prophylaxis          Subjective   No acute events overnight.  Patient denies any nausea vomiting had previously been tolerating regular diet before n.p.o. at midnight.  Overnight she reportedly had a bloody/dark bowel movement but this was isolated without recurrent episodes.  This morning she feels great she would like to go home but we discussed that with an ongoing leukocytosis this merits a degree of investigation.    Objective :  Temp:  [97.7 °F (36.5 °C)-98.8 °F (37.1 °C)] 97.9 °F (36.6 °C)  HR:  [] 65  BP: ()/(58-67) 114/61  Resp:  [15-40] 17  SpO2:  [89 %-98 %] 90 %  O2 Device: None (Room air)    I/O         05/05 0701  05/06 0700 05/06 0701  05/07 0700    P.O. 360 480    I.V. (mL/kg) 271 10 (0.2)    IV Piggyback 260     Total Intake(mL/kg) 891 490 (7.6)    Urine (mL/kg/hr) 850     Total Output 850     Net +41 +490          Unmeasured Urine Occurrence  3 x    Unmeasured Stool Occurrence  1 x            Physical Exam  Constitutional:       General: She is not in acute distress.     Appearance: Normal appearance. She is not toxic-appearing.    HENT:      Head: Normocephalic.      Mouth/Throat:      Mouth: Mucous membranes are moist.   Eyes:      Extraocular Movements: Extraocular movements intact.      Pupils: Pupils are equal, round, and reactive to light.   Cardiovascular:      Rate and Rhythm: Normal rate.   Pulmonary:      Effort: Pulmonary effort is normal.   Abdominal:      General: There is no distension.      Palpations: Abdomen is soft. There is no mass.      Tenderness: There is abdominal tenderness. There is no guarding or rebound.      Comments: Post surgical tenderness, incisions c/d/i   Skin:     General: Skin is warm.   Neurological:      General: No focal deficit present.      Mental Status: She is alert and oriented to person, place, and time.           Lab Results: I have reviewed the following results:  Recent Labs     05/04/25  0022 05/04/25  0214 05/04/25  0434 05/04/25  0515 05/04/25  1420 05/05/25  0503 05/05/25  1140 05/06/25  0406   WBC  --   --    < >  --   --  22.81*  --  33.60*   HGB  --   --    < >  --    < > 8.1*   < > 7.3*   HCT  --   --    < >  --    < > 25.6*   < > 23.0*   PLT  --   --    < >  --   --  263  --  296   BANDSPCT  --   --    < >  --   --  12*  --   --    SODIUM  --   --   --  136   < > 130*  --  134*   K  --   --   --  4.3   < > 4.1  --  4.0   CL  --   --   --  108   < > 104  --  105   CO2  --   --   --  20*   < > 22  --  23   BUN  --   --   --  42*   < > 44*  --  37*   CREATININE  --   --   --  0.64   < > 0.84  --  0.62   GLUC  --   --   --  128   < > 93  --  85   CAIONIZED  --   --   --   --   --  1.04*  --   --    MG  --   --    < > 2.1  --  2.1  --  2.4   PHOS  --   --    < > 3.3  --  2.6  --  2.5   AST  --   --   --  14  --   --   --   --    ALT  --   --   --  7  --   --   --   --    ALB  --   --   --  2.9*  --   --   --   --    TBILI  --   --   --  0.65  --   --   --   --    ALKPHOS  --   --   --  30*  --   --   --   --    HSTNI0 5  --   --   --   --   --   --   --    HSTNI2  --  4  --   --   --   --    --   --    LACTICACID 1.1  --   --   --   --   --   --   --     < > = values in this interval not displayed.             VTE Pharmacologic Prophylaxis: VTE covered by:  heparin (porcine), Subcutaneous, 5,000 Units at 05/06/25 2117     VTE Mechanical Prophylaxis: sequential compression device

## 2025-05-08 LAB
ABO GROUP BLD: NORMAL
ANION GAP SERPL CALCULATED.3IONS-SCNC: 6 MMOL/L (ref 4–13)
BLD GP AB SCN SERPL QL: NEGATIVE
BUN SERPL-MCNC: 20 MG/DL (ref 5–25)
C DIFF TOX GENS STL QL NAA+PROBE: NEGATIVE
CALCIUM SERPL-MCNC: 7.4 MG/DL (ref 8.4–10.2)
CHLORIDE SERPL-SCNC: 103 MMOL/L (ref 96–108)
CO2 SERPL-SCNC: 26 MMOL/L (ref 21–32)
CREAT SERPL-MCNC: 0.45 MG/DL (ref 0.6–1.3)
ERYTHROCYTE [DISTWIDTH] IN BLOOD BY AUTOMATED COUNT: 15.1 % (ref 11.6–15.1)
GFR SERPL CREATININE-BSD FRML MDRD: 94 ML/MIN/1.73SQ M
GLUCOSE SERPL-MCNC: 87 MG/DL (ref 65–140)
HCT VFR BLD AUTO: 21 % (ref 34.8–46.1)
HCT VFR BLD AUTO: 28.1 % (ref 34.8–46.1)
HGB BLD-MCNC: 6.7 G/DL (ref 11.5–15.4)
HGB BLD-MCNC: 7 G/DL (ref 11.5–15.4)
HGB BLD-MCNC: 9.4 G/DL (ref 11.5–15.4)
MAGNESIUM SERPL-MCNC: 1.9 MG/DL (ref 1.9–2.7)
MCH RBC QN AUTO: 28.3 PG (ref 26.8–34.3)
MCHC RBC AUTO-ENTMCNC: 31.9 G/DL (ref 31.4–37.4)
MCV RBC AUTO: 89 FL (ref 82–98)
PHOSPHATE SERPL-MCNC: 2.6 MG/DL (ref 2.3–4.1)
PLATELET # BLD AUTO: 393 THOUSANDS/UL (ref 149–390)
PMV BLD AUTO: 8.9 FL (ref 8.9–12.7)
POTASSIUM SERPL-SCNC: 3.8 MMOL/L (ref 3.5–5.3)
RBC # BLD AUTO: 2.37 MILLION/UL (ref 3.81–5.12)
RH BLD: NEGATIVE
SODIUM SERPL-SCNC: 135 MMOL/L (ref 135–147)
SPECIMEN EXPIRATION DATE: NORMAL
WBC # BLD AUTO: 26.46 THOUSAND/UL (ref 4.31–10.16)

## 2025-05-08 PROCEDURE — 86901 BLOOD TYPING SEROLOGIC RH(D): CPT

## 2025-05-08 PROCEDURE — 80048 BASIC METABOLIC PNL TOTAL CA: CPT | Performed by: PHYSICIAN ASSISTANT

## 2025-05-08 PROCEDURE — 86850 RBC ANTIBODY SCREEN: CPT

## 2025-05-08 PROCEDURE — 85014 HEMATOCRIT: CPT

## 2025-05-08 PROCEDURE — 30233N1 TRANSFUSION OF NONAUTOLOGOUS RED BLOOD CELLS INTO PERIPHERAL VEIN, PERCUTANEOUS APPROACH: ICD-10-PCS | Performed by: SURGERY

## 2025-05-08 PROCEDURE — 85018 HEMOGLOBIN: CPT

## 2025-05-08 PROCEDURE — 84100 ASSAY OF PHOSPHORUS: CPT | Performed by: PHYSICIAN ASSISTANT

## 2025-05-08 PROCEDURE — 85027 COMPLETE CBC AUTOMATED: CPT | Performed by: PHYSICIAN ASSISTANT

## 2025-05-08 PROCEDURE — 83735 ASSAY OF MAGNESIUM: CPT | Performed by: PHYSICIAN ASSISTANT

## 2025-05-08 PROCEDURE — 86923 COMPATIBILITY TEST ELECTRIC: CPT

## 2025-05-08 PROCEDURE — G0545 PR INHERENT VISIT TO INPT: HCPCS | Performed by: INTERNAL MEDICINE

## 2025-05-08 PROCEDURE — 86900 BLOOD TYPING SEROLOGIC ABO: CPT

## 2025-05-08 PROCEDURE — 99232 SBSQ HOSP IP/OBS MODERATE 35: CPT | Performed by: INTERNAL MEDICINE

## 2025-05-08 PROCEDURE — P9016 RBC LEUKOCYTES REDUCED: HCPCS

## 2025-05-08 PROCEDURE — 99024 POSTOP FOLLOW-UP VISIT: CPT | Performed by: SURGERY

## 2025-05-08 RX ORDER — LANOLIN ALCOHOL/MO/W.PET/CERES
400 CREAM (GRAM) TOPICAL 2 TIMES DAILY
Status: COMPLETED | OUTPATIENT
Start: 2025-05-08 | End: 2025-05-08

## 2025-05-08 RX ORDER — PANTOPRAZOLE SODIUM 40 MG/1
40 TABLET, DELAYED RELEASE ORAL
Status: DISCONTINUED | OUTPATIENT
Start: 2025-05-08 | End: 2025-05-13 | Stop reason: HOSPADM

## 2025-05-08 RX ADMIN — ACETAMINOPHEN 975 MG: 325 TABLET, FILM COATED ORAL at 21:18

## 2025-05-08 RX ADMIN — ATORVASTATIN CALCIUM 10 MG: 10 TABLET, FILM COATED ORAL at 10:00

## 2025-05-08 RX ADMIN — Medication 400 MG: at 17:52

## 2025-05-08 RX ADMIN — HEPARIN SODIUM 5000 UNITS: 5000 INJECTION INTRAVENOUS; SUBCUTANEOUS at 13:20

## 2025-05-08 RX ADMIN — Medication 2 TABLET: at 10:00

## 2025-05-08 RX ADMIN — HYDROCHLOROTHIAZIDE 25 MG: 25 TABLET ORAL at 10:00

## 2025-05-08 RX ADMIN — Medication 400 MG: at 10:00

## 2025-05-08 RX ADMIN — LEVOTHYROXINE SODIUM 75 MCG: 75 TABLET ORAL at 05:01

## 2025-05-08 RX ADMIN — ACETAMINOPHEN 975 MG: 325 TABLET, FILM COATED ORAL at 05:01

## 2025-05-08 RX ADMIN — HEPARIN SODIUM 5000 UNITS: 5000 INJECTION INTRAVENOUS; SUBCUTANEOUS at 05:01

## 2025-05-08 RX ADMIN — POTASSIUM & SODIUM PHOSPHATES POWDER PACK 280-160-250 MG 1 PACKET: 280-160-250 PACK at 16:51

## 2025-05-08 RX ADMIN — CHLORHEXIDINE GLUCONATE 15 ML: 1.2 SOLUTION ORAL at 20:36

## 2025-05-08 RX ADMIN — PANTOPRAZOLE SODIUM 40 MG: 40 TABLET, DELAYED RELEASE ORAL at 16:51

## 2025-05-08 RX ADMIN — HEPARIN SODIUM 5000 UNITS: 5000 INJECTION INTRAVENOUS; SUBCUTANEOUS at 21:18

## 2025-05-08 RX ADMIN — CHLORHEXIDINE GLUCONATE 15 ML: 1.2 SOLUTION ORAL at 10:00

## 2025-05-08 RX ADMIN — POTASSIUM & SODIUM PHOSPHATES POWDER PACK 280-160-250 MG 1 PACKET: 280-160-250 PACK at 10:00

## 2025-05-08 NOTE — ASSESSMENT & PLAN NOTE
-with incarcerated femoral hernia containing bowel, status post open hernia repair with small bowel resection 5/2  -rapid response called 5/2 for syncopal episode while having bowel movement, upgrade to ICU for close monitoring  5/2 1 unit PRBCs transfused  5/7 attempted IR aspiration for intrabdominal fluid  5/8 1 uPRBC given    Afebrile, vital signs stable within normal limits on room air    Plan  - Regular diet  - ID consulted: f/u stool studies: monitor off abx  - Multimodal pain regimen  - Encourage ambulation/out of bed, 3 times daily  - Encourage incentive spirometer use, 10 times per hour  - Strict I's and O's  - Aggressive pulmonary toilet, incentive spirometry  - DVT prophylaxis

## 2025-05-08 NOTE — PROGRESS NOTES
Progress Note - Surgery-General   Name: Monica Avitia 80 y.o. 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  -with incarcerated femoral hernia containing bowel, status post open hernia repair with small bowel resection 5/2  -rapid response called 5/2 for syncopal episode while having bowel movement, upgrade to ICU for close monitoring  -5/2 1 unit PRBCs transfused    Afebrile, vital signs stable within normal limits on room air  WBC 26 from 33  Hgb 6.7 from 7.1    Plan  - Regular diet  - F/u repeat hgb  - ID consulted, appreciate recommendations   - monitoring off abx  - f/u stool studies  - Multimodal pain regimen  - Encourage ambulation/out of bed, 3 times daily  - Encourage incentive spirometer use, 10 times per hour  - Strict I's and O's  - Aggressive pulmonary toilet, incentive spirometry  - DVT prophylaxis      Please contact the SecureChat role for the Surgery-General service with any questions/concerns.    Subjective   No acute events overnight. Patient reports some pain after eating. They are having dark BM. They are voiding. They are ambulating. They deny nausea, vomiting, chest pain, shortness of breath, fevers, chills.      Objective :  Temp:  [98.2 °F (36.8 °C)-99 °F (37.2 °C)] 99 °F (37.2 °C)  HR:  [] 100  BP: (110-121)/(53-66) 121/66  Resp:  [16-17] 16  SpO2:  [94 %-98 %] 95 %  O2 Device: None (Room air)  FiO2 (%):  [21-66] 21    I/O         05/06 0701  05/07 0700 05/07 0701  05/08 0700    P.O. 480 1200    I.V. (mL/kg) 10 (0.1) 10 (0.1)    Total Intake(mL/kg) 490 (6.7) 1210 (16.5)    Urine (mL/kg/hr)  350 (0.2)    Stool  0    Total Output  350    Net +490 +860          Unmeasured Urine Occurrence 4 x 2 x    Unmeasured Stool Occurrence 1 x 3 x            Physical Exam  Constitutional:       General: She is not in acute  distress.  HENT:      Head: Normocephalic and atraumatic.      Right Ear: External ear normal.      Left Ear: External ear normal.      Nose: Nose normal.      Mouth/Throat:      Pharynx: Oropharynx is clear.   Eyes:      Extraocular Movements: Extraocular movements intact.   Cardiovascular:      Rate and Rhythm: Normal rate.   Pulmonary:      Effort: Pulmonary effort is normal.   Abdominal:      General: There is no distension.      Palpations: Abdomen is soft.      Tenderness: There is abdominal tenderness.   Musculoskeletal:      Cervical back: Normal range of motion.   Skin:     General: Skin is warm and dry.      Comments: Incision clean, dry, intact   Neurological:      Mental Status: She is alert. Mental status is at baseline.   Psychiatric:         Mood and Affect: Mood normal.           Lab Results: I have reviewed the following results:  Recent Labs     05/05/25  0503 05/05/25  1140 05/06/25  0406 05/07/25  0604   WBC 22.81*  --  33.60* 33.62*   HGB 8.1*   < > 7.3* 7.1*   HCT 25.6*   < > 23.0* 22.2*     --  296 340   BANDSPCT 12*  --   --   --    SODIUM 130*  --  134* 133*   K 4.1  --  4.0 3.5     --  105 103   CO2 22  --  23 24   BUN 44*  --  37* 29*   CREATININE 0.84  --  0.62 0.53*   GLUC 93  --  85 87   CAIONIZED 1.04*  --   --   --    MG 2.1  --  2.4  --    PHOS 2.6  --  2.5  --     < > = values in this interval not displayed.       Imaging Results Review: I reviewed radiology reports from this admission including: CT abdomen/pelvis.  Other Study Results Review: No additional pertinent studies reviewed.    VTE Pharmacologic Prophylaxis: VTE covered by:  heparin (porcine), Subcutaneous, 5,000 Units at 05/07/25 2049     VTE Mechanical Prophylaxis: sequential compression device

## 2025-05-08 NOTE — ASSESSMENT & PLAN NOTE
-with incarcerated femoral hernia containing bowel, status post open hernia repair with small bowel resection 5/2  -rapid response called 5/2 for syncopal episode while having bowel movement, upgrade to ICU for close monitoring  -5/2 1 unit PRBCs transfused    Afebrile, vital signs stable within normal limits on room air  WBC 26 from 33  Hgb 6.7 from 7.1    Plan  - Regular diet  - F/u repeat hgb  - ID consulted, appreciate recommendations   - monitoring off abx  - f/u stool studies  - Multimodal pain regimen  - Encourage ambulation/out of bed, 3 times daily  - Encourage incentive spirometer use, 10 times per hour  - Strict I's and O's  - Aggressive pulmonary toilet, incentive spirometry  - DVT prophylaxis

## 2025-05-08 NOTE — ASSESSMENT & PLAN NOTE
Postoperative leukocytosis has been persistent and now up to 33.  No associated fevers.  Follow-up CT A/P with nonspecific findings which may be postoperative in etiology, without visible drainable collection.  IR input noted with original plan for paracentesis, however insufficient fluid present.  Patient has no peritoneal signs.  No evidence of surgical site infection.  No clinical or radiographic evidence to suggest pneumonia.  Negative UA. Patient otherwise remains afebrile and clinically stable after completion of appropriate postoperative antibiotic course.  More likely leukemoid reaction in the setting of recent surgery, significant postoperative anemia, possible intermittent urinary retention.  Patient did have a large bowel movement yesterday with visible blood.  WBC count is trending down today.   Continue close observation off any more antibiotics as patient has received sufficient postoperative course.   Follow-up pending stool for C. difficile   Continue to follow stool output closely   Serial abdominal exams   Urinary retention protocol   Follow hemoglobin closely and transfuse as indicated   Surgery follow-up ongoing   Follow temperatures and hemodynamics   Recheck CBC in AM

## 2025-05-08 NOTE — PLAN OF CARE
Problem: PAIN - ADULT  Goal: Verbalizes/displays adequate comfort level or baseline comfort level  Description: Interventions:- Encourage patient to monitor pain and request assistance- Assess pain using appropriate pain scale- Administer analgesics based on type and severity of pain and evaluate response- Implement non-pharmacological measures as appropriate and evaluate response- Consider cultural and social influences on pain and pain management- Notify physician/advanced practitioner if interventions unsuccessful or patient reports new pain  Outcome: Progressing     Problem: INFECTION - ADULT  Goal: Absence or prevention of progression during hospitalization  Description: INTERVENTIONS:- Assess and monitor for signs and symptoms of infection- Monitor lab/diagnostic results- Monitor all insertion sites, i.e. indwelling lines, tubes, and drains- Monitor endotracheal if appropriate and nasal secretions for changes in amount and color- [place] appropriate cooling/warming therapies per order- Administer medications as ordered- Instruct and encourage patient and family to use good hand hygiene technique- Identify and instruct in appropriate isolation precautions for identified infection/condition  Outcome: Progressing  Goal: Absence of fever/infection during neutropenic period  Description: INTERVENTIONS:- Monitor WBC  Outcome: Progressing     Problem: SAFETY ADULT  Goal: Patient will remain free of falls  Description: INTERVENTIONS:- Educate patient/family on patient safety including physical limitations- Instruct patient to call for assistance with activity - Consult OT/PT to assist with strengthening/mobility - Keep Call bell within reach- Keep bed low and locked with side rails adjusted as appropriate- Keep care items and personal belongings within reach- Initiate and maintain comfort rounds- Make Fall Risk Sign visible to staff- Offer Toileting every  Hours, in advance of need- Initiate/Maintain alarm- Obtain  necessary fall risk management equipment: - Apply yellow socks and bracelet for high fall risk patients- Consider moving patient to room near nurses station  Outcome: Progressing  Goal: Maintain or return to baseline ADL function  Description: INTERVENTIONS:-  Assess patient's ability to carry out ADLs; assess patient's baseline for ADL function and identify physical deficits which impact ability to perform ADLs (bathing, care of mouth/teeth, toileting, grooming, dressing, etc.)- Assess/evaluate cause of self-care deficits - Assess range of motion- Assess patient's mobility; develop plan if impaired- Assess patient's need for assistive devices and provide as appropriate- Encourage maximum independence but intervene and supervise when necessary- Involve family in performance of ADLs- Assess for home care needs following discharge - Consider OT consult to assist with ADL evaluation and planning for discharge- Provide patient education as appropriate  Outcome: Progressing  Goal: Maintains/Returns to pre admission functional level  Description: INTERVENTIONS:- Perform AM-PAC 6 Click Basic Mobility/ Daily Activity assessment daily.- Set and communicate daily mobility goal to care team and patient/family/caregiver. - Collaborate with rehabilitation services on mobility goals if consulted- Perform Range of Motion  times a day.- Reposition patient every  hours.- Dangle patient  times a day- Stand patient  times a day- Ambulate patient times a day- Out of bed to chair  times a day - Out of bed for meals times a day- Out of bed for toileting- Record patient progress and toleration of activity level   Outcome: Progressing     Problem: DISCHARGE PLANNING  Goal: Discharge to home or other facility with appropriate resources  Description: INTERVENTIONS:- Identify barriers to discharge w/patient and caregiver- Arrange for needed discharge resources and transportation as appropriate- Identify discharge learning needs (meds, wound  care, etc.)- Arrange for interpretive services to assist at discharge as needed- Refer to Case Management Department for coordinating discharge planning if the patient needs post-hospital services based on physician/advanced practitioner order or complex needs related to functional status, cognitive ability, or social support system  Outcome: Progressing     Problem: Knowledge Deficit  Goal: Patient/family/caregiver demonstrates understanding of disease process, treatment plan, medications, and discharge instructions  Description: Complete learning assessment and assess knowledge base.Interventions:- Provide teaching at level of understanding- Provide teaching via preferred learning methods  Outcome: Progressing     Problem: Prexisting or High Potential for Compromised Skin Integrity  Goal: Skin integrity is maintained or improved  Description: INTERVENTIONS:- Identify patients at risk for skin breakdown- Assess and monitor skin integrity- Assess and monitor nutrition and hydration status- Monitor labs - Assess for incontinence - Turn and reposition patient- Assist with mobility/ambulation- Relieve pressure over bony prominences- Avoid friction and shearing- Provide appropriate hygiene as needed including keeping skin clean and dry- Evaluate need for skin moisturizer/barrier cream- Collaborate with interdisciplinary team - Patient/family teaching- Consider wound care consult   Outcome: Progressing

## 2025-05-08 NOTE — PLAN OF CARE
Problem: INFECTION - ADULT  Goal: Absence or prevention of progression during hospitalization  Description: INTERVENTIONS:- Assess and monitor for signs and symptoms of infection- Monitor lab/diagnostic results- Monitor all insertion sites, i.e. indwelling lines, tubes, and drains- Monitor endotracheal if appropriate and nasal secretions for changes in amount and color- [place] appropriate cooling/warming therapies per order- Administer medications as ordered- Instruct and encourage patient and family to use good hand hygiene technique- Identify and instruct in appropriate isolation precautions for identified infection/condition  Outcome: Progressing     Problem: PAIN - ADULT  Goal: Verbalizes/displays adequate comfort level or baseline comfort level  Description: Interventions:- Encourage patient to monitor pain and request assistance- Assess pain using appropriate pain scale- Administer analgesics based on type and severity of pain and evaluate response- Implement non-pharmacological measures as appropriate and evaluate response- Consider cultural and social influences on pain and pain management- Notify physician/advanced practitioner if interventions unsuccessful or patient reports new pain  Outcome: Progressing

## 2025-05-08 NOTE — PROGRESS NOTES
Progress Note - Infectious Disease   Name: Monica Avitia 80 y.o. female I MRN: [id number]  Unit/Bed#: W -01 I Date of Admission: 5/1/2025   Date of Service: 5/8/2025 I Hospital Day: 6    Assessment & Plan  Leukocytosis  Postoperative leukocytosis has been persistent and now up to 33.  No associated fevers.  Follow-up CT A/P with nonspecific findings which may be postoperative in etiology, without visible drainable collection.  IR input noted with original plan for paracentesis, however insufficient fluid present.  Patient has no peritoneal signs.  No evidence of surgical site infection.  No clinical or radiographic evidence to suggest pneumonia.  Negative UA. Patient otherwise remains afebrile and clinically stable after completion of appropriate postoperative antibiotic course.  More likely leukemoid reaction in the setting of recent surgery, significant postoperative anemia, possible intermittent urinary retention.  Patient did have a large bowel movement yesterday with visible blood.  WBC count is trending down today.   Continue close observation off any more antibiotics as patient has received sufficient postoperative course.   Follow-up pending stool for C. 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  CT showed SBO secondary to strangulated right groin hernia.  Status post ex lap with repair of strangulated femoral hernia and SBR on 5/2.  Small area of necrotic small bowel was noted intraoperatively.  Patient received 5-day course of ceftriaxone/Flagyl.   Management per surgery service  Bladder distension  IR evaluation noted with concern for markedly distended  urinary bladder.  Recent UA was negative.   Urinary retention protocol        Antibiotics:  Off antibiotic D2        Subjective   Patient denies new focal complaints today.  No fevers.  She has not had a bowel movement today yet but is passing gas.  She is currently receiving blood transfusion.    Objective :  Temp:  [98.1 °F (36.7 °C)-99 °F (37.2 °C)] 98.1 °F (36.7 °C)  HR:  [] 99  BP: (107-121)/(54-66) 107/59  Resp:  [16-20] 20  SpO2:  [94 %-96 %] 94 %  O2 Device: None (Room air)  FiO2 (%):  [21] 21    General:  No acute distress, nontoxic, resting comfortably in bed  HEENT atraumatic normocephalic  Neck trachea midline  Psychiatric:  Awake and alert  Pulmonary:  Normal respiratory excursion without accessory muscle use  Abdomen: Nondistended with no rigidity or guarding  Extremities:  No edema  Skin:  No rashes  Neuro moves all extremities spontaneously      Lab Results: I have reviewed the following results:  Results from last 7 days   Lab Units 05/08/25  0748 05/08/25  0433 05/07/25  0604 05/06/25  0406   WBC Thousand/uL  --  26.46* 33.62* 33.60*   HEMOGLOBIN g/dL 7.0* 6.7* 7.1* 7.3*   PLATELETS Thousands/uL  --  393* 340 296     Results from last 7 days   Lab Units 05/08/25  0433 05/07/25  0604 05/06/25  0406 05/04/25  1420 05/04/25  0515 05/03/25  0400 05/02/25  1951 05/02/25  0514 05/01/25  2142   SODIUM mmol/L 135 133* 134*   < > 136   < > 132*   < > 129*   POTASSIUM mmol/L 3.8 3.5 4.0   < > 4.3   < > 3.8   < > 3.2*   CHLORIDE mmol/L 103 103 105   < > 108   < > 98   < > 89*   CO2 mmol/L 26 24 23   < > 20*   < > 25   < > 29   BUN mg/dL 20 29* 37*   < > 42*   < > 26*   < > 12   CREATININE mg/dL 0.45* 0.53* 0.62   < > 0.64   < > 0.89   < > 0.68   EGFR ml/min/1.73sq m 94 89 85   < > 84   < > 61   < > 82   CALCIUM mg/dL 7.4* 7.7* 7.9*   < > 7.7*   < > 7.8*   < > 9.6   AST U/L  --   --   --   --  14  --  15  --  23   ALT U/L  --   --   --   --  7  --  10  --  12   ALK PHOS U/L  --   --   --   --  30*  --   33*  --  51   ALBUMIN g/dL  --   --   --   --  2.9*  --  3.1*  --  4.5    < > = values in this interval not displayed.     Results from last 7 days   Lab Units 05/01/25  2346 05/01/25  2307   BLOOD CULTURE  No Growth After 5 Days. No Growth After 5 Days.     Results from last 7 days   Lab Units 05/01/25  2142   PROCALCITONIN ng/ml <0.05

## 2025-05-08 NOTE — PROGRESS NOTES
Progress Note - Surgery-General   Name: Monica Avitia 80 y.o. 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  -with incarcerated femoral hernia containing bowel, status post open hernia repair with small bowel resection 5/2  -rapid response called 5/2 for syncopal episode while having bowel movement, upgrade to ICU for close monitoring  5/2 1 unit PRBCs transfused  5/7 attempted IR aspiration for intrabdominal fluid  5/8 1 uPRBC given    Afebrile, vital signs stable within normal limits on room air    Plan  - Regular diet  - ID consulted: f/u stool studies: monitor off abx  - Multimodal pain regimen  - Encourage ambulation/out of bed, 3 times daily  - Encourage incentive spirometer use, 10 times per hour  - Strict I's and O's  - Aggressive pulmonary toilet, incentive spirometry  - DVT prophylaxis          Subjective   No n/v, odilia PO, passing flatus, still with darker BM.     Objective :  Temp:  [98.1 °F (36.7 °C)-99 °F (37.2 °C)] 98.1 °F (36.7 °C)  HR:  [] 99  BP: (107-121)/(54-66) 107/59  Resp:  [16-20] 20  SpO2:  [94 %-96 %] 94 %  O2 Device: None (Room air)    I/O         05/06 0701 05/07 0700 05/07 0701  05/08 0700 05/08 0701  05/09 0700    P.O. 480 1320 480    I.V. (mL/kg) 10 (0.1) 10 (0.1) 20 (0.3)    Blood   350    IV Piggyback       Total Intake(mL/kg) 490 (6.7) 1330 (18.7) 850 (12)    Urine (mL/kg/hr)  350 (0.2) 400 (0.5)    Stool  0 0    Total Output  350 400    Net +490 +980 +450           Unmeasured Urine Occurrence 4 x 2 x     Unmeasured Stool Occurrence 1 x 4 x 1 x            Physical Exam  Constitutional:       General: She is not in acute distress.     Appearance: Normal appearance. She is not toxic-appearing.   HENT:      Head: Normocephalic.      Mouth/Throat:      Mouth: Mucous membranes are moist.   Eyes:      Extraocular  Movements: Extraocular movements intact.      Pupils: Pupils are equal, round, and reactive to light.   Cardiovascular:      Rate and Rhythm: Normal rate.   Pulmonary:      Effort: Pulmonary effort is normal.   Abdominal:      General: There is no distension.      Palpations: Abdomen is soft. There is no mass.      Tenderness: There is abdominal tenderness. There is no guarding or rebound.      Comments: Post surgical tenderness, incisions c/d/i   Skin:     General: Skin is warm.   Neurological:      General: No focal deficit present.      Mental Status: She is alert and oriented to person, place, and time.           Lab Results: I have reviewed the following results:  Recent Labs     05/08/25  0433 05/08/25  0748   WBC 26.46*  --    HGB 6.7* 7.0*   HCT 21.0*  --    *  --    SODIUM 135  --    K 3.8  --      --    CO2 26  --    BUN 20  --    CREATININE 0.45*  --    GLUC 87  --    MG 1.9  --    PHOS 2.6  --              VTE Pharmacologic Prophylaxis: VTE covered by:  heparin (porcine), Subcutaneous, 5,000 Units at 05/08/25 1320     VTE Mechanical Prophylaxis: sequential compression device

## 2025-05-08 NOTE — PROGRESS NOTES
The pantoprazole has / have been converted to Oral per [facility] IV-to-PO Auto-Conversion Protocol for Adults as approved by the Pharmacy and Therapeutics Committee. The patient met all eligible criteria:  1) Age = 18 years old   2) Received at least one dose of the IV form   3) Receiving at least one other scheduled oral/enteral medication   4) Tolerating an oral/enteral diet   and did not have any exclusions:   1) Critical care patient   2) Active GI bleed (IF assessing H2RAs or PPIs)   3) Continuous tube feeding (IF assessing cipro, doxycycline, levofloxacin, minocycline, rifampin, or voriconazole)   4) Receiving PO vancomycin (IF assessing metronidazole)   5) Persistent nausea and/or vomiting   6) Ileus or gastrointestinal obstruction   7) Dany/nasogastric tube set for continuous suction   8) Specific order not to automatically convert to PO (in the order's comments or if discussed in the most recent Infectious Disease or primary team's progress notes).

## 2025-05-09 LAB
ABO GROUP BLD BPU: NORMAL
ANION GAP SERPL CALCULATED.3IONS-SCNC: 8 MMOL/L (ref 4–13)
ANISOCYTOSIS BLD QL SMEAR: PRESENT
BASOPHILS # BLD MANUAL: 0 THOUSAND/UL (ref 0–0.1)
BASOPHILS NFR MAR MANUAL: 0 % (ref 0–1)
BPU ID: NORMAL
BUN SERPL-MCNC: 13 MG/DL (ref 5–25)
CALCIUM SERPL-MCNC: 7.7 MG/DL (ref 8.4–10.2)
CHLORIDE SERPL-SCNC: 100 MMOL/L (ref 96–108)
CO2 SERPL-SCNC: 28 MMOL/L (ref 21–32)
CREAT SERPL-MCNC: 0.37 MG/DL (ref 0.6–1.3)
CROSSMATCH: NORMAL
EOSINOPHIL # BLD MANUAL: 0 THOUSAND/UL (ref 0–0.4)
EOSINOPHIL NFR BLD MANUAL: 0 % (ref 0–6)
ERYTHROCYTE [DISTWIDTH] IN BLOOD BY AUTOMATED COUNT: 15.7 % (ref 11.6–15.1)
GFR SERPL CREATININE-BSD FRML MDRD: 101 ML/MIN/1.73SQ M
GLUCOSE SERPL-MCNC: 88 MG/DL (ref 65–140)
HCT VFR BLD AUTO: 27.6 % (ref 34.8–46.1)
HGB BLD-MCNC: 8.9 G/DL (ref 11.5–15.4)
HYPERCHROMIA BLD QL SMEAR: PRESENT
LG PLATELETS BLD QL SMEAR: PRESENT
LYMPHOCYTES # BLD AUTO: 2.04 THOUSAND/UL (ref 0.6–4.47)
LYMPHOCYTES # BLD AUTO: 8 % (ref 14–44)
MAGNESIUM SERPL-MCNC: 1.8 MG/DL (ref 1.9–2.7)
MCH RBC QN AUTO: 27.5 PG (ref 26.8–34.3)
MCHC RBC AUTO-ENTMCNC: 32.2 G/DL (ref 31.4–37.4)
MCV RBC AUTO: 85 FL (ref 82–98)
METAMYELOCYTE ABSOLUTE CT: 0.25 THOUSAND/UL (ref 0–0.1)
METAMYELOCYTES NFR BLD MANUAL: 1 % (ref 0–1)
MONOCYTES # BLD AUTO: 2.29 THOUSAND/UL (ref 0–1.22)
MONOCYTES NFR BLD: 9 % (ref 4–12)
NEUTROPHILS # BLD MANUAL: 20.9 THOUSAND/UL (ref 1.85–7.62)
NEUTS BAND NFR BLD MANUAL: 2 % (ref 0–8)
NEUTS SEG NFR BLD AUTO: 80 % (ref 43–75)
NRBC BLD AUTO-RTO: 2 /100 WBC (ref 0–2)
PHOSPHATE SERPL-MCNC: 3.1 MG/DL (ref 2.3–4.1)
PLATELET # BLD AUTO: 442 THOUSANDS/UL (ref 149–390)
PLATELET BLD QL SMEAR: ABNORMAL
PLATELET CLUMP BLD QL SMEAR: PRESENT
PMV BLD AUTO: 8.5 FL (ref 8.9–12.7)
POIKILOCYTOSIS BLD QL SMEAR: PRESENT
POLYCHROMASIA BLD QL SMEAR: PRESENT
POTASSIUM SERPL-SCNC: 3.4 MMOL/L (ref 3.5–5.3)
RBC # BLD AUTO: 3.24 MILLION/UL (ref 3.81–5.12)
RBC MORPH BLD: PRESENT
SODIUM SERPL-SCNC: 136 MMOL/L (ref 135–147)
UNIT DISPENSE STATUS: NORMAL
UNIT PRODUCT CODE: NORMAL
UNIT PRODUCT VOLUME: 350 ML
UNIT RH: NORMAL
WBC # BLD AUTO: 25.49 THOUSAND/UL (ref 4.31–10.16)

## 2025-05-09 PROCEDURE — 97530 THERAPEUTIC ACTIVITIES: CPT

## 2025-05-09 PROCEDURE — 99024 POSTOP FOLLOW-UP VISIT: CPT | Performed by: SURGERY

## 2025-05-09 PROCEDURE — 85027 COMPLETE CBC AUTOMATED: CPT | Performed by: PHYSICIAN ASSISTANT

## 2025-05-09 PROCEDURE — 80048 BASIC METABOLIC PNL TOTAL CA: CPT | Performed by: PHYSICIAN ASSISTANT

## 2025-05-09 PROCEDURE — 97116 GAIT TRAINING THERAPY: CPT

## 2025-05-09 PROCEDURE — 85007 BL SMEAR W/DIFF WBC COUNT: CPT | Performed by: PHYSICIAN ASSISTANT

## 2025-05-09 PROCEDURE — 83735 ASSAY OF MAGNESIUM: CPT | Performed by: PHYSICIAN ASSISTANT

## 2025-05-09 PROCEDURE — G0545 PR INHERENT VISIT TO INPT: HCPCS | Performed by: INTERNAL MEDICINE

## 2025-05-09 PROCEDURE — 84100 ASSAY OF PHOSPHORUS: CPT | Performed by: PHYSICIAN ASSISTANT

## 2025-05-09 PROCEDURE — 99232 SBSQ HOSP IP/OBS MODERATE 35: CPT | Performed by: INTERNAL MEDICINE

## 2025-05-09 RX ORDER — LANOLIN ALCOHOL/MO/W.PET/CERES
400 CREAM (GRAM) TOPICAL 2 TIMES DAILY
Status: COMPLETED | OUTPATIENT
Start: 2025-05-09 | End: 2025-05-09

## 2025-05-09 RX ORDER — POTASSIUM CHLORIDE 1500 MG/1
40 TABLET, EXTENDED RELEASE ORAL ONCE
Status: COMPLETED | OUTPATIENT
Start: 2025-05-09 | End: 2025-05-09

## 2025-05-09 RX ADMIN — ACETAMINOPHEN 975 MG: 325 TABLET, FILM COATED ORAL at 06:11

## 2025-05-09 RX ADMIN — LEVOTHYROXINE SODIUM 75 MCG: 75 TABLET ORAL at 06:11

## 2025-05-09 RX ADMIN — ATORVASTATIN CALCIUM 10 MG: 10 TABLET, FILM COATED ORAL at 08:04

## 2025-05-09 RX ADMIN — ACETAMINOPHEN 975 MG: 325 TABLET, FILM COATED ORAL at 21:12

## 2025-05-09 RX ADMIN — CHLORHEXIDINE GLUCONATE 15 ML: 1.2 SOLUTION ORAL at 21:12

## 2025-05-09 RX ADMIN — PANTOPRAZOLE SODIUM 40 MG: 40 TABLET, DELAYED RELEASE ORAL at 17:24

## 2025-05-09 RX ADMIN — HYDROCHLOROTHIAZIDE 25 MG: 25 TABLET ORAL at 08:04

## 2025-05-09 RX ADMIN — POTASSIUM CHLORIDE 40 MEQ: 1500 TABLET, EXTENDED RELEASE ORAL at 08:07

## 2025-05-09 RX ADMIN — PANTOPRAZOLE SODIUM 40 MG: 40 TABLET, DELAYED RELEASE ORAL at 06:11

## 2025-05-09 RX ADMIN — Medication 400 MG: at 08:04

## 2025-05-09 RX ADMIN — HEPARIN SODIUM 5000 UNITS: 5000 INJECTION INTRAVENOUS; SUBCUTANEOUS at 21:12

## 2025-05-09 RX ADMIN — Medication 2 TABLET: at 08:04

## 2025-05-09 RX ADMIN — AMLODIPINE BESYLATE 2.5 MG: 2.5 TABLET ORAL at 08:04

## 2025-05-09 RX ADMIN — IRON SUCROSE 300 MG: 20 INJECTION, SOLUTION INTRAVENOUS at 10:27

## 2025-05-09 RX ADMIN — HEPARIN SODIUM 5000 UNITS: 5000 INJECTION INTRAVENOUS; SUBCUTANEOUS at 13:23

## 2025-05-09 RX ADMIN — Medication 400 MG: at 17:24

## 2025-05-09 RX ADMIN — HEPARIN SODIUM 5000 UNITS: 5000 INJECTION INTRAVENOUS; SUBCUTANEOUS at 06:11

## 2025-05-09 NOTE — PROGRESS NOTES
Progress Note - Infectious Disease   Name: Monica Avitia 80 y.o. female I MRN: [id number]  Unit/Bed#: W -01 I Date of Admission: 5/1/2025   Date of Service: 5/9/2025 I Hospital Day: 7    Assessment & Plan  Leukocytosis  Postoperative leukocytosis has been persistent and now up to 33.  No associated fevers.  Follow-up CT A/P with nonspecific findings which may be postoperative in etiology, without visible drainable collection.  IR input noted with original plan for paracentesis, however insufficient fluid present.  Patient has no peritoneal signs.  No evidence of surgical site infection.  No clinical or radiographic evidence to suggest pneumonia.  Negative UA. Patient otherwise remains afebrile and clinically stable after completion of appropriate postoperative antibiotic course.  More likely leukemoid reaction in the setting of recent surgery, significant postoperative anemia, possible intermittent urinary retention.  Negative C. difficile.  WBC count continues to slowly trend down off antibiotics.   Continue close observation off any more antibiotics as patient has received sufficient postoperative course.   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  CT showed SBO secondary to strangulated right groin hernia.  Status post ex lap with repair of strangulated femoral hernia and SBR on 5/2.  Small area of necrotic small bowel was noted intraoperatively.  Patient received 5-day course of ceftriaxone/Flagyl.   Management per surgery service  Bladder distension  IR evaluation noted with concern for markedly distended urinary bladder.  Recent UA was negative.   Urinary retention  protocol        Antibiotics:  Off antibiotic D3    As no ongoing acute ID issues, ID service will sign off.  Please contact us with any new questions.      Subjective   No reported acute overnight events.  No fevers.  Tolerating oral intake.  Received blood transfusion yesterday.    Objective :  Temp:  [98.2 °F (36.8 °C)-98.6 °F (37 °C)] 98.3 °F (36.8 °C)  HR:  [] 93  BP: (110-130)/(53-68) 123/68  Resp:  [16-18] 17  SpO2:  [77 %-100 %] 95 %  O2 Device: None (Room air)    General:  No acute distress, nontoxic  HEENT atraumatic normocephalic  Neck trachea midline  Psychiatric:  Awake and alert  Pulmonary:  Normal respiratory excursion without accessory muscle use  Abdomen: No rigidity or guarding  Extremities:  No edema  Skin:  No rashes  Neuro moves all extremities spontaneously      Lab Results: I have reviewed the following results:  Results from last 7 days   Lab Units 05/09/25  0612 05/08/25  1830 05/08/25  0748 05/08/25  0433 05/07/25  0604   WBC Thousand/uL 25.49*  --   --  26.46* 33.62*   HEMOGLOBIN g/dL 8.9* 9.4* 7.0* 6.7* 7.1*   PLATELETS Thousands/uL 442*  --   --  393* 340     Results from last 7 days   Lab Units 05/09/25  0612 05/08/25  0433 05/07/25  0604 05/04/25  1420 05/04/25  0515 05/03/25  0400 05/02/25  1951   SODIUM mmol/L 136 135 133*   < > 136   < > 132*   POTASSIUM mmol/L 3.4* 3.8 3.5   < > 4.3   < > 3.8   CHLORIDE mmol/L 100 103 103   < > 108   < > 98   CO2 mmol/L 28 26 24   < > 20*   < > 25   BUN mg/dL 13 20 29*   < > 42*   < > 26*   CREATININE mg/dL 0.37* 0.45* 0.53*   < > 0.64   < > 0.89   EGFR ml/min/1.73sq m 101 94 89   < > 84   < > 61   CALCIUM mg/dL 7.7* 7.4* 7.7*   < > 7.7*   < > 7.8*   AST U/L  --   --   --   --  14  --  15   ALT U/L  --   --   --   --  7  --  10   ALK PHOS U/L  --   --   --   --  30*  --  33*   ALBUMIN g/dL  --   --   --   --  2.9*  --  3.1*    < > = values in this interval not displayed.     Results from last 7 days   Lab Units 05/07/25  1501   C DIFF TOXIN B  BY PCR  Negative

## 2025-05-09 NOTE — PLAN OF CARE
Problem: PAIN - ADULT  Goal: Verbalizes/displays adequate comfort level or baseline comfort level  Description: Interventions:- Encourage patient to monitor pain and request assistance- Assess pain using appropriate pain scale- Administer analgesics based on type and severity of pain and evaluate response- Implement non-pharmacological measures as appropriate and evaluate response- Consider cultural and social influences on pain and pain management- Notify physician/advanced practitioner if interventions unsuccessful or patient reports new pain  Outcome: Progressing     Problem: INFECTION - ADULT  Goal: Absence or prevention of progression during hospitalization  Description: INTERVENTIONS:- Assess and monitor for signs and symptoms of infection- Monitor lab/diagnostic results- Monitor all insertion sites, i.e. indwelling lines, tubes, and drains- Monitor endotracheal if appropriate and nasal secretions for changes in amount and color- [place] appropriate cooling/warming therapies per order- Administer medications as ordered- Instruct and encourage patient and family to use good hand hygiene technique- Identify and instruct in appropriate isolation precautions for identified infection/condition  Outcome: Progressing  Goal: Absence of fever/infection during neutropenic period  Description: INTERVENTIONS:- Monitor WBC  Outcome: Progressing     Problem: SAFETY ADULT  Goal: Patient will remain free of falls  Description: INTERVENTIONS:- Educate patient/family on patient safety including physical limitations- Instruct patient to call for assistance with activity - Consult OT/PT to assist with strengthening/mobility - Keep Call bell within reach- Keep bed low and locked with side rails adjusted as appropriate- Keep care items and personal belongings within reach- Initiate and maintain comfort rounds- Make Fall Risk Sign visible to staff- Offer Toileting every  Hours, in advance of need- Initiate/Maintain alarm- Obtain  necessary fall risk management equipment: - Apply yellow socks and bracelet for high fall risk patients- Consider moving patient to room near nurses station  Outcome: Progressing  Goal: Maintain or return to baseline ADL function  Description: INTERVENTIONS:-  Assess patient's ability to carry out ADLs; assess patient's baseline for ADL function and identify physical deficits which impact ability to perform ADLs (bathing, care of mouth/teeth, toileting, grooming, dressing, etc.)- Assess/evaluate cause of self-care deficits - Assess range of motion- Assess patient's mobility; develop plan if impaired- Assess patient's need for assistive devices and provide as appropriate- Encourage maximum independence but intervene and supervise when necessary- Involve family in performance of ADLs- Assess for home care needs following discharge - Consider OT consult to assist with ADL evaluation and planning for discharge- Provide patient education as appropriate  Outcome: Progressing  Goal: Maintains/Returns to pre admission functional level  Description: INTERVENTIONS:- Perform AM-PAC 6 Click Basic Mobility/ Daily Activity assessment daily.- Set and communicate daily mobility goal to care team and patient/family/caregiver. - Collaborate with rehabilitation services on mobility goals if consulted- Perform Range of Motion  times a day.- Reposition patient every  hours.- Dangle patient  times a day- Stand patient  times a day- Ambulate patient  times a day- Out of bed to chair  times a day - Out of bed for meals  times a day- Out of bed for toileting- Record patient progress and toleration of activity level   Outcome: Progressing     Problem: DISCHARGE PLANNING  Goal: Discharge to home or other facility with appropriate resources  Description: INTERVENTIONS:- Identify barriers to discharge w/patient and caregiver- Arrange for needed discharge resources and transportation as appropriate- Identify discharge learning needs (meds, wound  care, etc.)- Arrange for interpretive services to assist at discharge as needed- Refer to Case Management Department for coordinating discharge planning if the patient needs post-hospital services based on physician/advanced practitioner order or complex needs related to functional status, cognitive ability, or social support system  Outcome: Progressing

## 2025-05-09 NOTE — PLAN OF CARE
Problem: PHYSICAL THERAPY ADULT  Goal: Performs mobility at highest level of function for planned discharge setting.  See evaluation for individualized goals.  Description: Treatment/Interventions: Functional transfer training, LE strengthening/ROM, Elevations, Therapeutic exercise, Endurance training, Equipment eval/education, Patient/family training, Bed mobility, Gait training, Compensatory technique education          See flowsheet documentation for full assessment, interventions and recommendations.  Outcome: Progressing  Note: Prognosis: Good  Problem List: Decreased endurance, Impaired balance, Decreased mobility, Decreased skin integrity  Assessment: pt began tx session seated OOB in the recliner as pt was agreeable to participate in PT tx sessions. In comparison to previous tx sessions pt continues to complete multiple functional transfers with RW with /s and no LOB. pt was able to increase her activity tolerance and ambulation distance as pt ambulated 150'x1 RW with /s, no LOB. pt did require a seated therapeutic rest break post ambulation  due to fatigue. pt was able to participate in TE activities while seated in the recliner with AROM, no increases in pain and good form throughout exercises. pt was educated with verbal/visual demonstration on all safe stair trial strategies prior to initiating steps. pt completed 5 steps with R sided hand rail, close /s and no LOB.        Rehab Resource Intensity Level, PT: III (Minimum Resource Intensity)    See flowsheet documentation for full assessment.

## 2025-05-09 NOTE — PHYSICAL THERAPY NOTE
PHYSICAL THERAPY NOTE          Patient Name: Monica Avitia  Today's Date: 25 1505   PT Last Visit   PT Visit Date 25   Note Type   Note Type Treatment   Pain Assessment   Pain Assessment Tool 0-10   Pain Score No Pain   Patient's Stated Pain Goal No pain   Hospital Pain Intervention(s) Repositioned;Ambulation/increased activity;Rest   Pain Rating: FLACC (Rest) - Face 0   Pain Rating: FLACC (Rest) - Legs 0   Pain Rating: FLACC (Rest) - Activity 0   Pain Rating: FLACC (Rest) - Cry 0   Pain Rating: FLACC (Rest) - Consolability 0   Score: FLACC (Rest) 0   Pain Rating: FLACC (Activity) - Face 0   Pain Rating: FLACC (Activity) - Legs 0   Pain Rating: FLACC (Activity) - Activity 0   Pain Rating: FLACC (Activity) - Cry 0   Pain Rating: FLACC (Activity) - Consolability 0   Score: FLACC (Activity) 0   Restrictions/Precautions   Weight Bearing Precautions Per Order No   Other Precautions Bed Alarm;Chair Alarm;Fall Risk;Telemetry   General   Chart Reviewed Yes   Response to Previous Treatment Patient with no complaints from previous session.   Family/Caregiver Present No   Cognition   Overall Cognitive Status WFL   Arousal/Participation Alert;Responsive;Cooperative   Attention Within functional limits   Orientation Level Oriented X4   Memory Within functional limits   Following Commands Follows all commands and directions without difficulty   Comments pt ID by name and    Subjective   Subjective pt was agreeable to participate in PT tx session, pt reports no pain pre/post tx sesison, pt reports feeling fatigue post ambulation   Bed Mobility   Additional Comments pt seated OOB in the recliner pre/post tx session with call bell, chair alarm activated, all pt needs met and PCA present   Transfers   Sit to Stand 5  Supervision   Additional items Assist x 1;Armrests;Increased time required;Verbal cues   Stand to  Sit 5  Supervision   Additional items Assist x 1;Armrests;Increased time required;Verbal cues   Stand pivot Unable to assess   Additional Comments pt completed multiple functional transfers with RW with close /s, no LOB   Ambulation/Elevation   Gait pattern Decreased foot clearance;Short stride;Excessively slow;Decreased heel strike;Decreased hip extension   Gait Assistance 5  Supervision   Additional items Assist x 1;Verbal cues   Assistive Device Rolling walker   Distance 150'x1 RW   Stair Management Assistance 5  Supervision   Additional items Assist x 1;Verbal cues;Increased time required   Stair Management Technique One rail R;Step to pattern;Foreward;Backward   Number of Stairs 5   Balance   Static Sitting Good   Dynamic Sitting Fair +   Static Standing Fair +   Dynamic Standing Fair   Ambulatory Fair   Endurance Deficit   Endurance Deficit Yes   Endurance Deficit Description limited ambulation distance   Activity Tolerance   Activity Tolerance Patient tolerated treatment well   Nurse Made Aware Spoke to RN Nicole Estrada   Knee AROM Long Arc Quad Sitting;15 reps;AROM;Bilateral   Ankle Pumps Sitting;20 reps;AROM;Bilateral   Marching Sitting;20 reps;AROM;Bilateral   Assessment   Prognosis Good   Problem List Decreased endurance;Impaired balance;Decreased mobility;Decreased skin integrity   Assessment pt began tx session seated OOB in the recliner as pt was agreeable to participate in PT tx sessions. In comparison to previous tx sessions pt continues to complete multiple functional transfers with RW with /s and no LOB. pt was able to increase her activity tolerance and ambulation distance as pt ambulated 150'x1 RW with /s, no LOB. pt did require a seated therapeutic rest break post ambulation  due to fatigue. pt was able to participate in TE activities while seated in the recliner with AROM, no increases in pain and good form throughout exercises. pt was educated with verbal/visual demonstration on all safe  "stair trial strategies prior to initiating steps. pt completed 5 steps with R sided hand rail, close /s and no LOB.   Goals   Patient Goals none stated   STG Expiration Date 05/12/25   PT Treatment Day 3   Plan   Treatment/Interventions Functional transfer training;LE strengthening/ROM;Elevations;Therapeutic exercise;Endurance training;Patient/family training;Equipment eval/education;Bed mobility;Gait training;Spoke to nursing   Progress Progressing toward goals   PT Frequency 3-5x/wk   Discharge Recommendation   Rehab Resource Intensity Level, PT III (Minimum Resource Intensity)   Equipment Recommended Walker   Walker Package Recommended Wheeled walker   Change/add to Walker Package? Yes, Change Size   Walker Size Michael (Ht <5'1\")   AM-PAC Basic Mobility Inpatient   Turning in Flat Bed Without Bedrails 4   Lying on Back to Sitting on Edge of Flat Bed Without Bedrails 4   Moving Bed to Chair 3   Standing Up From Chair Using Arms 3   Walk in Room 3   Climb 3-5 Stairs With Railing 3   Basic Mobility Inpatient Raw Score 20   Basic Mobility Standardized Score 43.99   [facility] Highest Level Of Mobility   -HLM Goal 6: Walk 10 steps or more   -HLM Achieved 7: Walk 25 feet or more   Education   Education Provided Mobility training;Assistive device;Other  (TE activities, stair trials)   Patient Demonstrates acceptance/verbal understanding   End of Consult   Patient Position at End of Consult Bedside chair;Bed/Chair alarm activated;All needs within reach   The patient's AM-PAC Basic Mobility Inpatient Short Form Raw Score is 20. A Raw score of greater than 16 suggests the patient may benefit from discharge to home. Please also refer to the recommendation of the Physical Therapist for safe discharge planning.    Baljinder Patel    "

## 2025-05-09 NOTE — PLAN OF CARE
Problem: PAIN - ADULT  Goal: Verbalizes/displays adequate comfort level or baseline comfort level  Description: Interventions:- Encourage patient to monitor pain and request assistance- Assess pain using appropriate pain scale- Administer analgesics based on type and severity of pain and evaluate response- Implement non-pharmacological measures as appropriate and evaluate response- Consider cultural and social influences on pain and pain management- Notify physician/advanced practitioner if interventions unsuccessful or patient reports new pain  Outcome: Progressing     Problem: INFECTION - ADULT  Goal: Absence or prevention of progression during hospitalization  Description: INTERVENTIONS:- Assess and monitor for signs and symptoms of infection- Monitor lab/diagnostic results- Monitor all insertion sites, i.e. indwelling lines, tubes, and drains- Monitor endotracheal if appropriate and nasal secretions for changes in amount and color- [place] appropriate cooling/warming therapies per order- Administer medications as ordered- Instruct and encourage patient and family to use good hand hygiene technique- Identify and instruct in appropriate isolation precautions for identified infection/condition  Outcome: Progressing  Goal: Absence of fever/infection during neutropenic period  Description: INTERVENTIONS:- Monitor WBC  Outcome: Progressing     Problem: SAFETY ADULT  Goal: Patient will remain free of falls  Description: INTERVENTIONS:- Educate patient/family on patient safety including physical limitations- Instruct patient to call for assistance with activity - Consult OT/PT to assist with strengthening/mobility - Keep Call bell within reach- Keep bed low and locked with side rails adjusted as appropriate- Keep care items and personal belongings within reach- Initiate and maintain comfort rounds- Make Fall Risk Sign visible to staff- Offer Toileting every  Hours, in advance of need- Initiate/Maintain alarm- Obtain  necessary fall risk management equipment: - Apply yellow socks and bracelet for high fall risk patients- Consider moving patient to room near nurses station  Outcome: Progressing  Goal: Maintain or return to baseline ADL function  Description: INTERVENTIONS:-  Assess patient's ability to carry out ADLs; assess patient's baseline for ADL function and identify physical deficits which impact ability to perform ADLs (bathing, care of mouth/teeth, toileting, grooming, dressing, etc.)- Assess/evaluate cause of self-care deficits - Assess range of motion- Assess patient's mobility; develop plan if impaired- Assess patient's need for assistive devices and provide as appropriate- Encourage maximum independence but intervene and supervise when necessary- Involve family in performance of ADLs- Assess for home care needs following discharge - Consider OT consult to assist with ADL evaluation and planning for discharge- Provide patient education as appropriate  Outcome: Progressing  Goal: Maintains/Returns to pre admission functional level  Description: INTERVENTIONS:- Perform AM-PAC 6 Click Basic Mobility/ Daily Activity assessment daily.- Set and communicate daily mobility goal to care team and patient/family/caregiver. - Collaborate with rehabilitation services on mobility goals if consulted- Perform Range of Motion times a day.- Reposition patient every  hours.- Dangle patient  times a day- Stand patient  times a day- Ambulate patient  times a day- Out of bed to chair times a day - Out of bed for meals  times a day- Out of bed for toileting- Record patient progress and toleration of activity level   Outcome: Progressing     Problem: DISCHARGE PLANNING  Goal: Discharge to home or other facility with appropriate resources  Description: INTERVENTIONS:- Identify barriers to discharge w/patient and caregiver- Arrange for needed discharge resources and transportation as appropriate- Identify discharge learning needs (meds, wound  care, etc.)- Arrange for interpretive services to assist at discharge as needed- Refer to Case Management Department for coordinating discharge planning if the patient needs post-hospital services based on physician/advanced practitioner order or complex needs related to functional status, cognitive ability, or social support system  Outcome: Progressing     Problem: Knowledge Deficit  Goal: Patient/family/caregiver demonstrates understanding of disease process, treatment plan, medications, and discharge instructions  Description: Complete learning assessment and assess knowledge base.Interventions:- Provide teaching at level of understanding- Provide teaching via preferred learning methods  Outcome: Progressing     Problem: Prexisting or High Potential for Compromised Skin Integrity  Goal: Skin integrity is maintained or improved  Description: INTERVENTIONS:- Identify patients at risk for skin breakdown- Assess and monitor skin integrity- Assess and monitor nutrition and hydration status- Monitor labs - Assess for incontinence - Turn and reposition patient- Assist with mobility/ambulation- Relieve pressure over bony prominences- Avoid friction and shearing- Provide appropriate hygiene as needed including keeping skin clean and dry- Evaluate need for skin moisturizer/barrier cream- Collaborate with interdisciplinary team - Patient/family teaching- Consider wound care consult   Outcome: Progressing     Problem: Nutrition/Hydration-ADULT  Goal: Nutrient/Hydration intake appropriate for improving, restoring or maintaining nutritional needs  Description: Monitor and assess patient's nutrition/hydration status for malnutrition. Collaborate with interdisciplinary team and initiate plan and interventions as ordered.  Monitor patient's weight and dietary intake as ordered or per policy. Utilize nutrition screening tool and intervene as necessary. Determine patient's food preferences and provide high-protein, high-caloric  foods as appropriate. INTERVENTIONS:- Monitor oral intake, urinary output, labs, and treatment plans- Assess nutrition and hydration status and recommend course of action- Evaluate amount of meals eaten- Assist patient with eating if necessary - Allow adequate time for meals- Recommend/ encourage appropriate diets, oral nutritional supplements, and vitamin/mineral supplements- Order, calculate, and assess calorie counts as needed- Recommend, monitor, and adjust tube feedings and TPN/PPN based on assessed needs- Assess need for intravenous fluids- Provide specific nutrition/hydration education as appropriate- Include patient/family/caregiver in decisions related to nutrition  Outcome: Progressing

## 2025-05-09 NOTE — ASSESSMENT & PLAN NOTE
Postoperative leukocytosis has been persistent and now up to 33.  No associated fevers.  Follow-up CT A/P with nonspecific findings which may be postoperative in etiology, without visible drainable collection.  IR input noted with original plan for paracentesis, however insufficient fluid present.  Patient has no peritoneal signs.  No evidence of surgical site infection.  No clinical or radiographic evidence to suggest pneumonia.  Negative UA. Patient otherwise remains afebrile and clinically stable after completion of appropriate postoperative antibiotic course.  More likely leukemoid reaction in the setting of recent surgery, significant postoperative anemia, possible intermittent urinary retention.  Negative C. difficile.  WBC count continues to slowly trend down off antibiotics.   Continue close observation off any more antibiotics as patient has received sufficient postoperative course.   Continue to follow stool output closely   Serial abdominal exams   Urinary retention protocol   Follow hemoglobin closely and transfuse as indicated   Surgery follow-up ongoing   Follow temperatures and hemodynamics   Recheck CBC in AM

## 2025-05-10 ENCOUNTER — APPOINTMENT (INPATIENT)
Dept: CT IMAGING | Facility: HOSPITAL | Age: 80
DRG: 329 | End: 2025-05-10
Payer: COMMERCIAL

## 2025-05-10 LAB
ANION GAP SERPL CALCULATED.3IONS-SCNC: 8 MMOL/L (ref 4–13)
BASOPHILS # BLD AUTO: 0.1 THOUSANDS/ÂΜL (ref 0–0.1)
BASOPHILS NFR BLD AUTO: 0 % (ref 0–1)
BUN SERPL-MCNC: 10 MG/DL (ref 5–25)
CALCIUM SERPL-MCNC: 7.4 MG/DL (ref 8.4–10.2)
CHLORIDE SERPL-SCNC: 99 MMOL/L (ref 96–108)
CO2 SERPL-SCNC: 27 MMOL/L (ref 21–32)
CREAT SERPL-MCNC: 0.36 MG/DL (ref 0.6–1.3)
EOSINOPHIL # BLD AUTO: 0.09 THOUSAND/ÂΜL (ref 0–0.61)
EOSINOPHIL NFR BLD AUTO: 0 % (ref 0–6)
ERYTHROCYTE [DISTWIDTH] IN BLOOD BY AUTOMATED COUNT: 15.4 % (ref 11.6–15.1)
GFR SERPL CREATININE-BSD FRML MDRD: 102 ML/MIN/1.73SQ M
GLUCOSE SERPL-MCNC: 78 MG/DL (ref 65–140)
HCT VFR BLD AUTO: 25.6 % (ref 34.8–46.1)
HGB BLD-MCNC: 8.3 G/DL (ref 11.5–15.4)
IMM GRANULOCYTES # BLD AUTO: >0.5 THOUSAND/UL (ref 0–0.2)
IMM GRANULOCYTES NFR BLD AUTO: 6 % (ref 0–2)
LYMPHOCYTES # BLD AUTO: 1.81 THOUSANDS/ÂΜL (ref 0.6–4.47)
LYMPHOCYTES NFR BLD AUTO: 7 % (ref 14–44)
MAGNESIUM SERPL-MCNC: 1.8 MG/DL (ref 1.9–2.7)
MCH RBC QN AUTO: 27.8 PG (ref 26.8–34.3)
MCHC RBC AUTO-ENTMCNC: 32.4 G/DL (ref 31.4–37.4)
MCV RBC AUTO: 86 FL (ref 82–98)
MONOCYTES # BLD AUTO: 1.04 THOUSAND/ÂΜL (ref 0.17–1.22)
MONOCYTES NFR BLD AUTO: 4 % (ref 4–12)
NEUTROPHILS # BLD AUTO: 21.04 THOUSANDS/ÂΜL (ref 1.85–7.62)
NEUTS SEG NFR BLD AUTO: 83 % (ref 43–75)
NRBC BLD AUTO-RTO: 0 /100 WBCS
PLATELET # BLD AUTO: 451 THOUSANDS/UL (ref 149–390)
PMV BLD AUTO: 8.6 FL (ref 8.9–12.7)
POTASSIUM SERPL-SCNC: 3.7 MMOL/L (ref 3.5–5.3)
RBC # BLD AUTO: 2.99 MILLION/UL (ref 3.81–5.12)
SODIUM SERPL-SCNC: 134 MMOL/L (ref 135–147)
WBC # BLD AUTO: 25.65 THOUSAND/UL (ref 4.31–10.16)

## 2025-05-10 PROCEDURE — 83735 ASSAY OF MAGNESIUM: CPT | Performed by: PHYSICIAN ASSISTANT

## 2025-05-10 PROCEDURE — 85027 COMPLETE CBC AUTOMATED: CPT | Performed by: PHYSICIAN ASSISTANT

## 2025-05-10 PROCEDURE — 99024 POSTOP FOLLOW-UP VISIT: CPT | Performed by: SURGERY

## 2025-05-10 PROCEDURE — 80048 BASIC METABOLIC PNL TOTAL CA: CPT | Performed by: PHYSICIAN ASSISTANT

## 2025-05-10 PROCEDURE — 71260 CT THORAX DX C+: CPT

## 2025-05-10 PROCEDURE — 74177 CT ABD & PELVIS W/CONTRAST: CPT

## 2025-05-10 RX ORDER — POTASSIUM CHLORIDE 1500 MG/1
40 TABLET, EXTENDED RELEASE ORAL ONCE
Status: COMPLETED | OUTPATIENT
Start: 2025-05-10 | End: 2025-05-10

## 2025-05-10 RX ORDER — MAGNESIUM SULFATE HEPTAHYDRATE 40 MG/ML
2 INJECTION, SOLUTION INTRAVENOUS ONCE
Status: COMPLETED | OUTPATIENT
Start: 2025-05-10 | End: 2025-05-10

## 2025-05-10 RX ADMIN — ACETAMINOPHEN 975 MG: 325 TABLET, FILM COATED ORAL at 05:09

## 2025-05-10 RX ADMIN — CHLORHEXIDINE GLUCONATE 15 ML: 1.2 SOLUTION ORAL at 08:34

## 2025-05-10 RX ADMIN — IOHEXOL 70 ML: 350 INJECTION, SOLUTION INTRAVENOUS at 18:14

## 2025-05-10 RX ADMIN — POTASSIUM CHLORIDE 40 MEQ: 1500 TABLET, EXTENDED RELEASE ORAL at 08:34

## 2025-05-10 RX ADMIN — PANTOPRAZOLE SODIUM 40 MG: 40 TABLET, DELAYED RELEASE ORAL at 05:09

## 2025-05-10 RX ADMIN — AMLODIPINE BESYLATE 2.5 MG: 2.5 TABLET ORAL at 08:34

## 2025-05-10 RX ADMIN — HEPARIN SODIUM 5000 UNITS: 5000 INJECTION INTRAVENOUS; SUBCUTANEOUS at 14:09

## 2025-05-10 RX ADMIN — MAGNESIUM SULFATE HEPTAHYDRATE 2 G: 40 INJECTION, SOLUTION INTRAVENOUS at 08:32

## 2025-05-10 RX ADMIN — ACETAMINOPHEN 975 MG: 325 TABLET, FILM COATED ORAL at 14:09

## 2025-05-10 RX ADMIN — ATORVASTATIN CALCIUM 10 MG: 10 TABLET, FILM COATED ORAL at 08:34

## 2025-05-10 RX ADMIN — ACETAMINOPHEN 975 MG: 325 TABLET, FILM COATED ORAL at 21:12

## 2025-05-10 RX ADMIN — PANTOPRAZOLE SODIUM 40 MG: 40 TABLET, DELAYED RELEASE ORAL at 15:06

## 2025-05-10 RX ADMIN — Medication 2 TABLET: at 08:34

## 2025-05-10 RX ADMIN — HEPARIN SODIUM 5000 UNITS: 5000 INJECTION INTRAVENOUS; SUBCUTANEOUS at 05:09

## 2025-05-10 RX ADMIN — IOHEXOL 50 ML: 240 INJECTION, SOLUTION INTRATHECAL; INTRAVASCULAR; INTRAVENOUS; ORAL at 11:00

## 2025-05-10 RX ADMIN — HYDROCHLOROTHIAZIDE 25 MG: 25 TABLET ORAL at 08:34

## 2025-05-10 RX ADMIN — HEPARIN SODIUM 5000 UNITS: 5000 INJECTION INTRAVENOUS; SUBCUTANEOUS at 21:12

## 2025-05-10 RX ADMIN — CHLORHEXIDINE GLUCONATE 15 ML: 1.2 SOLUTION ORAL at 21:12

## 2025-05-10 RX ADMIN — LEVOTHYROXINE SODIUM 75 MCG: 75 TABLET ORAL at 05:09

## 2025-05-10 NOTE — ASSESSMENT & PLAN NOTE
-with incarcerated femoral hernia containing bowel, status post open hernia repair with small bowel resection 5/2  -rapid response called 5/2 for syncopal episode while having bowel movement, upgrade to ICU for close monitoring  5/2 1 unit PRBCs transfused  5/7 attempted IR aspiration for intrabdominal fluid  5/8 1 uPRBC given    Afebrile, vital signs stable within normal limits on room air    Plan  - NPO in preparation for possible procedure  - IR consult for consideration of drainage of collections   - ID consulted, appreciate recommendations   - Multimodal pain regimen  - Encourage ambulation/out of bed, 3 times daily  - Encourage incentive spirometer use, 10 times per hour  - Strict I's and O's  - Aggressive pulmonary toilet, incentive spirometry  - DVT prophylaxis

## 2025-05-10 NOTE — PROGRESS NOTES
Progress Note - Surgery-General   Name: Monica Avitia 80 y.o. 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 SQH held at midnight.  - ID consulted: monitor off abx, f/u IR drainage of collections for culture data. C diff negative. Appreciate recommendations.  - Multimodal pain regimen  - Encourage ambulation/out of bed, 3 times daily  - Encourage incentive spirometer use, 10 times per hour  - Strict I's and O's  - Aggressive pulmonary toilet, incentive spirometry  - DVT prophylaxis      Please contact the SecureChat role for the Surgery-General service with any questions/concerns.    Subjective   No acute events overnight. Patient reports pain is improved. They are tolerating their diet. They are having flatus and less dark BM. They are voiding. They are ambulating. They are using their IS to 1000. They deny nausea, vomiting, chest pain, shortness of breath, fevers, chills.      Objective :  Temp:  [97.9 °F (36.6 °C)-98.8 °F (37.1 °C)] 97.9 °F (36.6 °C)  HR:  [] 94  BP: (123-141)/(63-77) 141/77  Resp:  [14-17] 16  SpO2:  [91 %-94 %] 94 %  O2 Device: None (Room air)    I/O         05/08 0701  05/09 0700 05/09 0701  05/10 0700 05/10 0701  05/11 0700    P.O. 1080 660     I.V. (mL/kg) 20 (0.3)      Blood 350      Total  Intake(mL/kg) 1450 (20.1) 660 (4.3)     Urine (mL/kg/hr) 700 (0.4) 0 (0)     Stool 0 0     Total Output 700 0     Net +750 +660            Unmeasured Urine Occurrence  5 x 1 x    Unmeasured Stool Occurrence 3 x 1 x 1 x            Physical Exam  Constitutional:       General: She is not in acute distress.  HENT:      Head: Normocephalic and atraumatic.      Right Ear: External ear normal.      Left Ear: External ear normal.      Nose: Nose normal.      Mouth/Throat:      Pharynx: Oropharynx is clear.   Eyes:      Extraocular Movements: Extraocular movements intact.   Cardiovascular:      Rate and Rhythm: Normal rate.   Pulmonary:      Effort: Pulmonary effort is normal.   Abdominal:      General: There is distension (mild).      Palpations: Abdomen is soft.      Tenderness: There is abdominal tenderness (mild).   Musculoskeletal:      Cervical back: Normal range of motion.   Skin:     General: Skin is warm and dry.   Neurological:      Mental Status: She is alert. Mental status is at baseline.   Psychiatric:         Mood and Affect: Mood normal.           Lab Results: I have reviewed the following results:  Recent Labs     05/09/25  0612 05/10/25  0425   WBC 25.49* 25.65*   HGB 8.9* 8.3*   HCT 27.6* 25.6*   * 451*   BANDSPCT 2  --    SODIUM 136 134*   K 3.4* 3.7    99   CO2 28 27   BUN 13 10   CREATININE 0.37* 0.36*   GLUC 88 78   MG 1.8* 1.8*   PHOS 3.1  --        Imaging Results Review: I reviewed radiology reports from this admission including: CT abdomen/pelvis.  Other Study Results Review: No additional pertinent studies reviewed.    VTE Pharmacologic Prophylaxis: VTE covered by:  heparin (porcine), Subcutaneous, 5,000 Units at 05/10/25 0500     VTE Mechanical Prophylaxis: sequential compression device

## 2025-05-10 NOTE — PLAN OF CARE
Problem: PAIN - ADULT  Goal: Verbalizes/displays adequate comfort level or baseline comfort level  Description: Interventions:- Encourage patient to monitor pain and request assistance- Assess pain using appropriate pain scale- Administer analgesics based on type and severity of pain and evaluate response- Implement non-pharmacological measures as appropriate and evaluate response- Consider cultural and social influences on pain and pain management- Notify physician/advanced practitioner if interventions unsuccessful or patient reports new pain  Outcome: Progressing     Problem: INFECTION - ADULT  Goal: Absence or prevention of progression during hospitalization  Description: INTERVENTIONS:- Assess and monitor for signs and symptoms of infection- Monitor lab/diagnostic results- Monitor all insertion sites, i.e. indwelling lines, tubes, and drains- Monitor endotracheal if appropriate and nasal secretions for changes in amount and color- [place] appropriate cooling/warming therapies per order- Administer medications as ordered- Instruct and encourage patient and family to use good hand hygiene technique- Identify and instruct in appropriate isolation precautions for identified infection/condition  Outcome: Progressing  Goal: Absence of fever/infection during neutropenic period  Description: INTERVENTIONS:- Monitor WBC  Outcome: Progressing     Problem: SAFETY ADULT  Goal: Patient will remain free of falls  Description: INTERVENTIONS:- Educate patient/family on patient safety including physical limitations- Instruct patient to call for assistance with activity - Consult OT/PT to assist with strengthening/mobility - Keep Call bell within reach- Keep bed low and locked with side rails adjusted as appropriate- Keep care items and personal belongings within reach- Initiate and maintain comfort rounds- Make Fall Risk Sign visible to staff- Offer Toileting every  Hours, in advance of need- Initiate/Maintain alarm- Obtain  necessary fall risk management equipment: - Apply yellow socks and bracelet for high fall risk patients- Consider moving patient to room near nurses station  Outcome: Progressing  Goal: Maintain or return to baseline ADL function  Description: INTERVENTIONS:-  Assess patient's ability to carry out ADLs; assess patient's baseline for ADL function and identify physical deficits which impact ability to perform ADLs (bathing, care of mouth/teeth, toileting, grooming, dressing, etc.)- Assess/evaluate cause of self-care deficits - Assess range of motion- Assess patient's mobility; develop plan if impaired- Assess patient's need for assistive devices and provide as appropriate- Encourage maximum independence but intervene and supervise when necessary- Involve family in performance of ADLs- Assess for home care needs following discharge - Consider OT consult to assist with ADL evaluation and planning for discharge- Provide patient education as appropriate  Outcome: Progressing  Goal: Maintains/Returns to pre admission functional level  Description: INTERVENTIONS:- Perform AM-PAC 6 Click Basic Mobility/ Daily Activity assessment daily.- Set and communicate daily mobility goal to care team and patient/family/caregiver. - Collaborate with rehabilitation services on mobility goals if consulted- Perform Range of Motion  times a day.- Reposition patient every hours.- Dangle patient  times a day- Stand patient  times a day- Ambulate patient  times a day- Out of bed to chair  times a day - Out of bed for meals  times a day- Out of bed for toileting- Record patient progress and toleration of activity level   Outcome: Progressing     Problem: DISCHARGE PLANNING  Goal: Discharge to home or other facility with appropriate resources  Description: INTERVENTIONS:- Identify barriers to discharge w/patient and caregiver- Arrange for needed discharge resources and transportation as appropriate- Identify discharge learning needs (meds, wound  care, etc.)- Arrange for interpretive services to assist at discharge as needed- Refer to Case Management Department for coordinating discharge planning if the patient needs post-hospital services based on physician/advanced practitioner order or complex needs related to functional status, cognitive ability, or social support system  Outcome: Progressing     Problem: Knowledge Deficit  Goal: Patient/family/caregiver demonstrates understanding of disease process, treatment plan, medications, and discharge instructions  Description: Complete learning assessment and assess knowledge base.Interventions:- Provide teaching at level of understanding- Provide teaching via preferred learning methods  Outcome: Progressing     Problem: Prexisting or High Potential for Compromised Skin Integrity  Goal: Skin integrity is maintained or improved  Description: INTERVENTIONS:- Identify patients at risk for skin breakdown- Assess and monitor skin integrity- Assess and monitor nutrition and hydration status- Monitor labs - Assess for incontinence - Turn and reposition patient- Assist with mobility/ambulation- Relieve pressure over bony prominences- Avoid friction and shearing- Provide appropriate hygiene as needed including keeping skin clean and dry- Evaluate need for skin moisturizer/barrier cream- Collaborate with interdisciplinary team - Patient/family teaching- Consider wound care consult   Outcome: Progressing     Problem: Nutrition/Hydration-ADULT  Goal: Nutrient/Hydration intake appropriate for improving, restoring or maintaining nutritional needs  Description: Monitor and assess patient's nutrition/hydration status for malnutrition. Collaborate with interdisciplinary team and initiate plan and interventions as ordered.  Monitor patient's weight and dietary intake as ordered or per policy. Utilize nutrition screening tool and intervene as necessary. Determine patient's food preferences and provide high-protein, high-caloric  foods as appropriate. INTERVENTIONS:- Monitor oral intake, urinary output, labs, and treatment plans- Assess nutrition and hydration status and recommend course of action- Evaluate amount of meals eaten- Assist patient with eating if necessary - Allow adequate time for meals- Recommend/ encourage appropriate diets, oral nutritional supplements, and vitamin/mineral supplements- Order, calculate, and assess calorie counts as needed- Recommend, monitor, and adjust tube feedings and TPN/PPN based on assessed needs- Assess need for intravenous fluids- Provide specific nutrition/hydration education as appropriate- Include patient/family/caregiver in decisions related to nutrition  Outcome: Progressing

## 2025-05-10 NOTE — ASSESSMENT & PLAN NOTE
-with incarcerated femoral hernia containing bowel, status post open hernia repair with small bowel resection 5/2  -rapid response called 5/2 for syncopal episode while having bowel movement, upgrade to ICU for close monitoring  5/2 1 unit PRBCs transfused  5/7 attempted IR aspiration for intrabdominal fluid  5/8 1 uPRBC given    Afebrile, vital signs stable within normal limits on room air  WBC 25.65 from 25.49    Plan  - Regular diet  - ID consulted: monitor off abx. C diff negative. Appreciate recommendations.  - Consider repeat CT  CAP with PO and IV contrast  - Multimodal pain regimen  - Encourage ambulation/out of bed, 3 times daily  - Encourage incentive spirometer use, 10 times per hour  - Strict I's and O's  - Aggressive pulmonary toilet, incentive spirometry  - DVT prophylaxis

## 2025-05-10 NOTE — PROGRESS NOTES
Progress Note - Surgery-General   Name: Monica Avitia 80 y.o. 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 C diff negative. Appreciate recommendations.  - Consider repeat CT  CAP with PO and IV contrast  - Multimodal pain regimen  - Encourage ambulation/out of bed, 3 times daily  - Encourage incentive spirometer use, 10 times per hour  - Strict I's and O's  - Aggressive pulmonary toilet, incentive spirometry  - DVT prophylaxis      Please contact the SecureChat role for the Surgery-General service with any questions/concerns.    Subjective   No acute events overnight. Patient reports pain is improved. They endorse some pain after eating. They are having flatus and BM, reports they are becoming lighter in color. They are voiding. They are ambulating and OOB to chair. They are using their IS to 1000. They deny nausea, vomiting, chest pain, shortness of breath, fevers, chills.      Objective :  Temp:  [98.1 °F (36.7 °C)-98.8 °F (37.1 °C)] 98.2 °F (36.8 °C)  HR:  [] 94  BP: (114-135)/(53-73) 135/63  Resp:  [14-17] 14  SpO2:  [86 %-95 %] 94 %  O2 Device: None (Room air)    I/O         05/08 0701  05/09 0700 05/09 0701  05/10 0700    P.O. 1080 660    I.V. (mL/kg) 20 (0.3)     Blood 350     Total Intake(mL/kg) 1450 (20.1) 660 (9.2)    Urine (mL/kg/hr) 700 (0.4)     Stool 0     Total  Output 700     Net +750 +660          Unmeasured Urine Occurrence  4 x    Unmeasured Stool Occurrence 3 x 1 x            Physical Exam  Constitutional:       General: She is not in acute distress.  HENT:      Head: Normocephalic and atraumatic.      Right Ear: External ear normal.      Left Ear: External ear normal.      Nose: Nose normal.      Mouth/Throat:      Pharynx: Oropharynx is clear.   Eyes:      Extraocular Movements: Extraocular movements intact.   Cardiovascular:      Rate and Rhythm: Normal rate.   Pulmonary:      Effort: Pulmonary effort is normal.   Abdominal:      General: There is no distension.      Palpations: Abdomen is soft.      Tenderness: There is abdominal tenderness.   Musculoskeletal:      Cervical back: Normal range of motion.   Skin:     General: Skin is warm and dry.   Neurological:      Mental Status: She is alert. Mental status is at baseline.   Psychiatric:         Mood and Affect: Mood normal.           Lab Results: I have reviewed the following results:  Recent Labs     05/09/25  0612   WBC 25.49*   HGB 8.9*   HCT 27.6*   *   BANDSPCT 2   SODIUM 136   K 3.4*      CO2 28   BUN 13   CREATININE 0.37*   GLUC 88   MG 1.8*   PHOS 3.1       Imaging Results Review: I reviewed radiology reports from this admission including: CT abdomen/pelvis.  Other Study Results Review: No additional pertinent studies reviewed.    VTE Pharmacologic Prophylaxis: VTE covered by:  heparin (porcine), Subcutaneous, 5,000 Units at 05/09/25 2112     VTE Mechanical Prophylaxis: sequential compression device

## 2025-05-11 LAB
ANION GAP SERPL CALCULATED.3IONS-SCNC: 5 MMOL/L (ref 4–13)
ANION GAP SERPL CALCULATED.3IONS-SCNC: 9 MMOL/L (ref 4–13)
BUN SERPL-MCNC: 8 MG/DL (ref 5–25)
BUN SERPL-MCNC: 9 MG/DL (ref 5–25)
CALCIUM SERPL-MCNC: 7.5 MG/DL (ref 8.4–10.2)
CALCIUM SERPL-MCNC: 7.5 MG/DL (ref 8.4–10.2)
CHLORIDE SERPL-SCNC: 96 MMOL/L (ref 96–108)
CHLORIDE SERPL-SCNC: 97 MMOL/L (ref 96–108)
CO2 SERPL-SCNC: 26 MMOL/L (ref 21–32)
CO2 SERPL-SCNC: 27 MMOL/L (ref 21–32)
CREAT SERPL-MCNC: 0.33 MG/DL (ref 0.6–1.3)
CREAT SERPL-MCNC: 0.33 MG/DL (ref 0.6–1.3)
ERYTHROCYTE [DISTWIDTH] IN BLOOD BY AUTOMATED COUNT: 15.1 % (ref 11.6–15.1)
GFR SERPL CREATININE-BSD FRML MDRD: 105 ML/MIN/1.73SQ M
GFR SERPL CREATININE-BSD FRML MDRD: 105 ML/MIN/1.73SQ M
GLUCOSE SERPL-MCNC: 73 MG/DL (ref 65–140)
GLUCOSE SERPL-MCNC: 75 MG/DL (ref 65–140)
HCT VFR BLD AUTO: 27.3 % (ref 34.8–46.1)
HGB BLD-MCNC: 8.9 G/DL (ref 11.5–15.4)
MAGNESIUM SERPL-MCNC: 2 MG/DL (ref 1.9–2.7)
MCH RBC QN AUTO: 28.1 PG (ref 26.8–34.3)
MCHC RBC AUTO-ENTMCNC: 32.6 G/DL (ref 31.4–37.4)
MCV RBC AUTO: 86 FL (ref 82–98)
NRBC BLD AUTO-RTO: 0 /100 WBCS
PLATELET # BLD AUTO: 534 THOUSANDS/UL (ref 149–390)
PMV BLD AUTO: 8.3 FL (ref 8.9–12.7)
POTASSIUM SERPL-SCNC: 4.2 MMOL/L (ref 3.5–5.3)
POTASSIUM SERPL-SCNC: 6.2 MMOL/L (ref 3.5–5.3)
RBC # BLD AUTO: 3.17 MILLION/UL (ref 3.81–5.12)
SODIUM SERPL-SCNC: 129 MMOL/L (ref 135–147)
SODIUM SERPL-SCNC: 131 MMOL/L (ref 135–147)
WBC # BLD AUTO: 28.5 THOUSAND/UL (ref 4.31–10.16)

## 2025-05-11 PROCEDURE — 85027 COMPLETE CBC AUTOMATED: CPT

## 2025-05-11 PROCEDURE — 80048 BASIC METABOLIC PNL TOTAL CA: CPT

## 2025-05-11 PROCEDURE — 99024 POSTOP FOLLOW-UP VISIT: CPT | Performed by: SURGERY

## 2025-05-11 PROCEDURE — 80048 BASIC METABOLIC PNL TOTAL CA: CPT | Performed by: SURGERY

## 2025-05-11 PROCEDURE — 83735 ASSAY OF MAGNESIUM: CPT

## 2025-05-11 RX ADMIN — PANTOPRAZOLE SODIUM 40 MG: 40 TABLET, DELAYED RELEASE ORAL at 15:29

## 2025-05-11 RX ADMIN — HYDROCHLOROTHIAZIDE 25 MG: 25 TABLET ORAL at 09:21

## 2025-05-11 RX ADMIN — AMLODIPINE BESYLATE 2.5 MG: 2.5 TABLET ORAL at 09:21

## 2025-05-11 RX ADMIN — CHLORHEXIDINE GLUCONATE 15 ML: 1.2 SOLUTION ORAL at 09:21

## 2025-05-11 RX ADMIN — HEPARIN SODIUM 5000 UNITS: 5000 INJECTION INTRAVENOUS; SUBCUTANEOUS at 05:20

## 2025-05-11 RX ADMIN — ATORVASTATIN CALCIUM 10 MG: 10 TABLET, FILM COATED ORAL at 09:21

## 2025-05-11 RX ADMIN — HEPARIN SODIUM 5000 UNITS: 5000 INJECTION INTRAVENOUS; SUBCUTANEOUS at 21:52

## 2025-05-11 RX ADMIN — ONDANSETRON 4 MG: 2 INJECTION INTRAMUSCULAR; INTRAVENOUS at 17:47

## 2025-05-11 RX ADMIN — ACETAMINOPHEN 975 MG: 325 TABLET, FILM COATED ORAL at 05:20

## 2025-05-11 RX ADMIN — CHLORHEXIDINE GLUCONATE 15 ML: 1.2 SOLUTION ORAL at 21:52

## 2025-05-11 RX ADMIN — ACETAMINOPHEN 975 MG: 325 TABLET, FILM COATED ORAL at 21:52

## 2025-05-11 RX ADMIN — LEVOTHYROXINE SODIUM 75 MCG: 75 TABLET ORAL at 05:20

## 2025-05-11 RX ADMIN — HEPARIN SODIUM 5000 UNITS: 5000 INJECTION INTRAVENOUS; SUBCUTANEOUS at 13:13

## 2025-05-11 RX ADMIN — PANTOPRAZOLE SODIUM 40 MG: 40 TABLET, DELAYED RELEASE ORAL at 05:20

## 2025-05-11 NOTE — PLAN OF CARE
Problem: PAIN - ADULT  Goal: Verbalizes/displays adequate comfort level or baseline comfort level  Description: Interventions:- Encourage patient to monitor pain and request assistance- Assess pain using appropriate pain scale- Administer analgesics based on type and severity of pain and evaluate response- Implement non-pharmacological measures as appropriate and evaluate response- Consider cultural and social influences on pain and pain management- Notify physician/advanced practitioner if interventions unsuccessful or patient reports new pain  Outcome: Progressing     Problem: INFECTION - ADULT  Goal: Absence or prevention of progression during hospitalization  Description: INTERVENTIONS:- Assess and monitor for signs and symptoms of infection- Monitor lab/diagnostic results- Monitor all insertion sites, i.e. indwelling lines, tubes, and drains- Monitor endotracheal if appropriate and nasal secretions for changes in amount and color- [place] appropriate cooling/warming therapies per order- Administer medications as ordered- Instruct and encourage patient and family to use good hand hygiene technique- Identify and instruct in appropriate isolation precautions for identified infection/condition  Outcome: Progressing  Goal: Absence of fever/infection during neutropenic period  Description: INTERVENTIONS:- Monitor WBC  Outcome: Progressing     Problem: SAFETY ADULT  Goal: Patient will remain free of falls  Description: INTERVENTIONS:- Educate patient/family on patient safety including physical limitations- Instruct patient to call for assistance with activity - Consult OT/PT to assist with strengthening/mobility - Keep Call bell within reach- Keep bed low and locked with side rails adjusted as appropriate- Keep care items and personal belongings within reach- Initiate and maintain comfort rounds- Make Fall Risk Sign visible to staff- Offer Toileting every  Hours, in advance of need- Initiate/Maintain alarm- Obtain  necessary fall risk management equipment: - Apply yellow socks and bracelet for high fall risk patients- Consider moving patient to room near nurses station  Outcome: Progressing  Goal: Maintain or return to baseline ADL function  Description: INTERVENTIONS:-  Assess patient's ability to carry out ADLs; assess patient's baseline for ADL function and identify physical deficits which impact ability to perform ADLs (bathing, care of mouth/teeth, toileting, grooming, dressing, etc.)- Assess/evaluate cause of self-care deficits - Assess range of motion- Assess patient's mobility; develop plan if impaired- Assess patient's need for assistive devices and provide as appropriate- Encourage maximum independence but intervene and supervise when necessary- Involve family in performance of ADLs- Assess for home care needs following discharge - Consider OT consult to assist with ADL evaluation and planning for discharge- Provide patient education as appropriate  Outcome: Progressing  Goal: Maintains/Returns to pre admission functional level  Description: INTERVENTIONS:- Perform AM-PAC 6 Click Basic Mobility/ Daily Activity assessment daily.- Set and communicate daily mobility goal to care team and patient/family/caregiver. - Collaborate with rehabilitation services on mobility goals if consulted- Perform Range of Motion  times a day.- Reposition patient every  hours.- Dangle patient  times a day- Stand patient  times a day- Ambulate patient  times a day- Out of bed to chair  times a day - Out of bed for meals times a day- Out of bed for toileting- Record patient progress and toleration of activity level   Outcome: Progressing     Problem: DISCHARGE PLANNING  Goal: Discharge to home or other facility with appropriate resources  Description: INTERVENTIONS:- Identify barriers to discharge w/patient and caregiver- Arrange for needed discharge resources and transportation as appropriate- Identify discharge learning needs (meds, wound  care, etc.)- Arrange for interpretive services to assist at discharge as needed- Refer to Case Management Department for coordinating discharge planning if the patient needs post-hospital services based on physician/advanced practitioner order or complex needs related to functional status, cognitive ability, or social support system  Outcome: Progressing     Problem: Knowledge Deficit  Goal: Patient/family/caregiver demonstrates understanding of disease process, treatment plan, medications, and discharge instructions  Description: Complete learning assessment and assess knowledge base.Interventions:- Provide teaching at level of understanding- Provide teaching via preferred learning methods  Outcome: Progressing     Problem: Prexisting or High Potential for Compromised Skin Integrity  Goal: Skin integrity is maintained or improved  Description: INTERVENTIONS:- Identify patients at risk for skin breakdown- Assess and monitor skin integrity- Assess and monitor nutrition and hydration status- Monitor labs - Assess for incontinence - Turn and reposition patient- Assist with mobility/ambulation- Relieve pressure over bony prominences- Avoid friction and shearing- Provide appropriate hygiene as needed including keeping skin clean and dry- Evaluate need for skin moisturizer/barrier cream- Collaborate with interdisciplinary team - Patient/family teaching- Consider wound care consult   Outcome: Progressing     Problem: Nutrition/Hydration-ADULT  Goal: Nutrient/Hydration intake appropriate for improving, restoring or maintaining nutritional needs  Description: Monitor and assess patient's nutrition/hydration status for malnutrition. Collaborate with interdisciplinary team and initiate plan and interventions as ordered.  Monitor patient's weight and dietary intake as ordered or per policy. Utilize nutrition screening tool and intervene as necessary. Determine patient's food preferences and provide high-protein, high-caloric  foods as appropriate. INTERVENTIONS:- Monitor oral intake, urinary output, labs, and treatment plans- Assess nutrition and hydration status and recommend course of action- Evaluate amount of meals eaten- Assist patient with eating if necessary - Allow adequate time for meals- Recommend/ encourage appropriate diets, oral nutritional supplements, and vitamin/mineral supplements- Order, calculate, and assess calorie counts as needed- Recommend, monitor, and adjust tube feedings and TPN/PPN based on assessed needs- Assess need for intravenous fluids- Provide specific nutrition/hydration education as appropriate- Include patient/family/caregiver in decisions related to nutrition  Outcome: Progressing

## 2025-05-11 NOTE — PROGRESS NOTES
Progress Note - Surgery-General   Name: Monica Avitia 80 y.o. 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  -with incarcerated femoral hernia containing bowel, status post open hernia repair with small bowel resection 5/2  -rapid response called 5/2 for syncopal episode while having bowel movement, upgrade to ICU for close monitoring  5/2 1 unit PRBCs transfused  5/7 attempted IR aspiration for intrabdominal fluid  5/8 1 uPRBC given    Afebrile, vital signs stable within normal limits on room air    Plan  - NPO in preparation for possible procedure  - IR consult for consideration of drainage of collections   - ID consulted, appreciate recommendations   - Multimodal pain regimen  - Encourage ambulation/out of bed, 3 times daily  - Encourage incentive spirometer use, 10 times per hour  - Strict I's and O's  - Aggressive pulmonary toilet, incentive spirometry  - DVT prophylaxis      Please contact the SecureChat role for the Surgery-General service with any questions/concerns.    Subjective   No acute events overnight. Patient reports pain is overall improved. They were tolerating their diet. They are having flatus and BM. They are voiding. They are ambulating, OOB to chair. They are using their IS to 1000. They deny nausea, vomiting, chest pain, shortness of breath, fevers, chills.      Objective :  Temp:  [97.9 °F (36.6 °C)-99.6 °F (37.6 °C)] 98.3 °F (36.8 °C)  HR:  [87-99] 89  BP: (127-146)/(58-71) 135/68  Resp:  [16-20] 16  SpO2:  [94 %-96 %] 96 %  O2 Device: None (Room air)  FiO2 (%):  [21] 21    I/O         05/09 0701  05/10 0700 05/10 0701 05/11 0700 05/11 0701 05/12 0700    P.O. 660 720     I.V. (mL/kg)       Blood       Total Intake(mL/kg) 660 (4.3) 720 (4.8)     Urine (mL/kg/hr) 0 (0) 0 (0)     Emesis/NG output  0     Stool 0 0     Total  Output 0 0     Net +660 +720            Unmeasured Urine Occurrence 5 x 4 x     Unmeasured Stool Occurrence 1 x 4 x             Physical Exam  Constitutional:       General: She is not in acute distress.  HENT:      Head: Normocephalic and atraumatic.      Right Ear: External ear normal.      Left Ear: External ear normal.      Nose: Nose normal.      Mouth/Throat:      Pharynx: Oropharynx is clear.   Eyes:      Extraocular Movements: Extraocular movements intact.   Cardiovascular:      Rate and Rhythm: Normal rate.   Pulmonary:      Effort: Pulmonary effort is normal.   Abdominal:      General: There is no distension.      Palpations: Abdomen is soft.      Tenderness: There is no abdominal tenderness.   Musculoskeletal:      Cervical back: Normal range of motion.   Skin:     General: Skin is warm and dry.   Neurological:      Mental Status: She is alert. Mental status is at baseline.   Psychiatric:         Mood and Affect: Mood normal.           Lab Results: I have reviewed the following results:  Recent Labs     05/09/25  0612 05/10/25  0425 05/11/25  0501 05/11/25  0732   WBC 25.49*   < > 28.50*  --    HGB 8.9*   < > 8.9*  --    HCT 27.6*   < > 27.3*  --    *   < > 534*  --    BANDSPCT 2  --   --   --    SODIUM 136   < > 129* 131*   K 3.4*   < > 6.2* 4.2      < > 97 96   CO2 28   < > 27 26   BUN 13   < > 8 9   CREATININE 0.37*   < > 0.33* 0.33*   GLUC 88   < > 73 75   MG 1.8*   < > 2.0  --    PHOS 3.1  --   --   --     < > = values in this interval not displayed.       Imaging Results Review: I reviewed radiology reports from this admission including: CT abdomen/pelvis.  Other Study Results Review: No additional pertinent studies reviewed.    VTE Pharmacologic Prophylaxis: VTE covered by:  heparin (porcine), Subcutaneous, 5,000 Units at 05/11/25 0520     VTE Mechanical Prophylaxis: sequential compression device

## 2025-05-11 NOTE — PLAN OF CARE
Problem: PAIN - ADULT  Goal: Verbalizes/displays adequate comfort level or baseline comfort level  Description: Interventions:- Encourage patient to monitor pain and request assistance- Assess pain using appropriate pain scale- Administer analgesics based on type and severity of pain and evaluate response- Implement non-pharmacological measures as appropriate and evaluate response- Consider cultural and social influences on pain and pain management- Notify physician/advanced practitioner if interventions unsuccessful or patient reports new pain  Outcome: Progressing     Problem: INFECTION - ADULT  Goal: Absence or prevention of progression during hospitalization  Description: INTERVENTIONS:- Assess and monitor for signs and symptoms of infection- Monitor lab/diagnostic results- Monitor all insertion sites, i.e. indwelling lines, tubes, and drains- Monitor endotracheal if appropriate and nasal secretions for changes in amount and color- [place] appropriate cooling/warming therapies per order- Administer medications as ordered- Instruct and encourage patient and family to use good hand hygiene technique- Identify and instruct in appropriate isolation precautions for identified infection/condition  Outcome: Progressing  Goal: Absence of fever/infection during neutropenic period  Description: INTERVENTIONS:- Monitor WBC  Outcome: Progressing     Problem: SAFETY ADULT  Goal: Patient will remain free of falls  Description: INTERVENTIONS:- Educate patient/family on patient safety including physical limitations- Instruct patient to call for assistance with activity - Consult OT/PT to assist with strengthening/mobility - Keep Call bell within reach- Keep bed low and locked with side rails adjusted as appropriate- Keep care items and personal belongings within reach- Initiate and maintain comfort rounds- Make Fall Risk Sign visible to staff- Offer Toileting every  Hours, in advance of need- Initiate/Maintain alarm- Obtain  necessary fall risk management equipment: - Apply yellow socks and bracelet for high fall risk patients- Consider moving patient to room near nurses station  Outcome: Progressing  Goal: Maintain or return to baseline ADL function  Description: INTERVENTIONS:-  Assess patient's ability to carry out ADLs; assess patient's baseline for ADL function and identify physical deficits which impact ability to perform ADLs (bathing, care of mouth/teeth, toileting, grooming, dressing, etc.)- Assess/evaluate cause of self-care deficits - Assess range of motion- Assess patient's mobility; develop plan if impaired- Assess patient's need for assistive devices and provide as appropriate- Encourage maximum independence but intervene and supervise when necessary- Involve family in performance of ADLs- Assess for home care needs following discharge - Consider OT consult to assist with ADL evaluation and planning for discharge- Provide patient education as appropriate  Outcome: Progressing  Goal: Maintains/Returns to pre admission functional level  Description: INTERVENTIONS:- Perform AM-PAC 6 Click Basic Mobility/ Daily Activity assessment daily.- Set and communicate daily mobility goal to care team and patient/family/caregiver. - Collaborate with rehabilitation services on mobility goals if consulted- Perform Range of Motion  times a day.- Reposition patient every  hours.- Dangle patient  times a day- Stand patient  times a day- Ambulate patient  times a day- Out of bed to chair  times a day - Out of bed for meals  times a day- Out of bed for toileting- Record patient progress and toleration of activity level   Outcome: Progressing     Problem: DISCHARGE PLANNING  Goal: Discharge to home or other facility with appropriate resources  Description: INTERVENTIONS:- Identify barriers to discharge w/patient and caregiver- Arrange for needed discharge resources and transportation as appropriate- Identify discharge learning needs (meds, wound  care, etc.)- Arrange for interpretive services to assist at discharge as needed- Refer to Case Management Department for coordinating discharge planning if the patient needs post-hospital services based on physician/advanced practitioner order or complex needs related to functional status, cognitive ability, or social support system  Outcome: Progressing     Problem: Knowledge Deficit  Goal: Patient/family/caregiver demonstrates understanding of disease process, treatment plan, medications, and discharge instructions  Description: Complete learning assessment and assess knowledge base.Interventions:- Provide teaching at level of understanding- Provide teaching via preferred learning methods  Outcome: Progressing     Problem: Prexisting or High Potential for Compromised Skin Integrity  Goal: Skin integrity is maintained or improved  Description: INTERVENTIONS:- Identify patients at risk for skin breakdown- Assess and monitor skin integrity- Assess and monitor nutrition and hydration status- Monitor labs - Assess for incontinence - Turn and reposition patient- Assist with mobility/ambulation- Relieve pressure over bony prominences- Avoid friction and shearing- Provide appropriate hygiene as needed including keeping skin clean and dry- Evaluate need for skin moisturizer/barrier cream- Collaborate with interdisciplinary team - Patient/family teaching- Consider wound care consult   Outcome: Progressing     Problem: Nutrition/Hydration-ADULT  Goal: Nutrient/Hydration intake appropriate for improving, restoring or maintaining nutritional needs  Description: Monitor and assess patient's nutrition/hydration status for malnutrition. Collaborate with interdisciplinary team and initiate plan and interventions as ordered.  Monitor patient's weight and dietary intake as ordered or per policy. Utilize nutrition screening tool and intervene as necessary. Determine patient's food preferences and provide high-protein, high-caloric  foods as appropriate. INTERVENTIONS:- Monitor oral intake, urinary output, labs, and treatment plans- Assess nutrition and hydration status and recommend course of action- Evaluate amount of meals eaten- Assist patient with eating if necessary - Allow adequate time for meals- Recommend/ encourage appropriate diets, oral nutritional supplements, and vitamin/mineral supplements- Order, calculate, and assess calorie counts as needed- Recommend, monitor, and adjust tube feedings and TPN/PPN based on assessed needs- Assess need for intravenous fluids- Provide specific nutrition/hydration education as appropriate- Include patient/family/caregiver in decisions related to nutrition  Outcome: Progressing

## 2025-05-11 NOTE — ASSESSMENT & PLAN NOTE
-with incarcerated femoral hernia containing bowel, status post open hernia repair with small bowel resection 5/2  -rapid response called 5/2 for syncopal episode while having bowel movement, upgrade to ICU for close monitoring  5/2 1 unit PRBCs transfused  5/7 attempted IR aspiration for intrabdominal fluid, no drainage  5/8 1 uPRBC given  5/10 CT AP: intraabdominal collections increased in size    Afebrile, vital signs stable within normal limits on room air    Plan  - NPO for IR drainage of intra-abdominal collections today. SQH held at midnight.  - ID consulted: monitor off abx, f/u IR drainage of collections for culture data. C diff negative. Appreciate recommendations.  - Multimodal pain regimen  - Encourage ambulation/out of bed, 3 times daily  - Encourage incentive spirometer use, 10 times per hour  - Strict I's and O's  - Aggressive pulmonary toilet, incentive spirometry  - DVT prophylaxis

## 2025-05-12 ENCOUNTER — APPOINTMENT (INPATIENT)
Dept: CT IMAGING | Facility: HOSPITAL | Age: 80
DRG: 329 | End: 2025-05-12
Attending: RADIOLOGY
Payer: COMMERCIAL

## 2025-05-12 PROBLEM — K65.1 INTRA-ABDOMINAL ABSCESS (HCC): Status: ACTIVE | Noted: 2025-05-12

## 2025-05-12 LAB
ANION GAP SERPL CALCULATED.3IONS-SCNC: 10 MMOL/L (ref 4–13)
ANISOCYTOSIS BLD QL SMEAR: PRESENT
BASOPHILS # BLD MANUAL: 0 THOUSAND/UL (ref 0–0.1)
BASOPHILS NFR MAR MANUAL: 0 % (ref 0–1)
BUN SERPL-MCNC: 10 MG/DL (ref 5–25)
CALCIUM SERPL-MCNC: 7.4 MG/DL (ref 8.4–10.2)
CHLORIDE SERPL-SCNC: 96 MMOL/L (ref 96–108)
CO2 SERPL-SCNC: 25 MMOL/L (ref 21–32)
CREAT SERPL-MCNC: 0.39 MG/DL (ref 0.6–1.3)
EOSINOPHIL # BLD MANUAL: 0.21 THOUSAND/UL (ref 0–0.4)
EOSINOPHIL NFR BLD MANUAL: 1 % (ref 0–6)
ERYTHROCYTE [DISTWIDTH] IN BLOOD BY AUTOMATED COUNT: 15.2 % (ref 11.6–15.1)
GFR SERPL CREATININE-BSD FRML MDRD: 99 ML/MIN/1.73SQ M
GLUCOSE SERPL-MCNC: 70 MG/DL (ref 65–140)
HCT VFR BLD AUTO: 25.6 % (ref 34.8–46.1)
HGB BLD-MCNC: 8.5 G/DL (ref 11.5–15.4)
LYMPHOCYTES # BLD AUTO: 1.03 THOUSAND/UL (ref 0.6–4.47)
LYMPHOCYTES # BLD AUTO: 5 % (ref 14–44)
MCH RBC QN AUTO: 29.8 PG (ref 26.8–34.3)
MCHC RBC AUTO-ENTMCNC: 33.2 G/DL (ref 31.4–37.4)
MCV RBC AUTO: 90 FL (ref 82–98)
MONOCYTES # BLD AUTO: 0.82 THOUSAND/UL (ref 0–1.22)
MONOCYTES NFR BLD: 4 % (ref 4–12)
MYELOCYTE ABSOLUTE CT: 0.41 THOUSAND/UL (ref 0–0.1)
MYELOCYTES NFR BLD MANUAL: 2 % (ref 0–1)
NEUTROPHILS # BLD MANUAL: 18.05 THOUSAND/UL (ref 1.85–7.62)
NEUTS BAND NFR BLD MANUAL: 1 % (ref 0–8)
NEUTS SEG NFR BLD AUTO: 87 % (ref 43–75)
PLATELET # BLD AUTO: 508 THOUSANDS/UL (ref 149–390)
PLATELET BLD QL SMEAR: ABNORMAL
PMV BLD AUTO: 8.6 FL (ref 8.9–12.7)
POIKILOCYTOSIS BLD QL SMEAR: PRESENT
POLYCHROMASIA BLD QL SMEAR: PRESENT
POTASSIUM SERPL-SCNC: 4.4 MMOL/L (ref 3.5–5.3)
RBC # BLD AUTO: 2.85 MILLION/UL (ref 3.81–5.12)
RBC MORPH BLD: PRESENT
SODIUM SERPL-SCNC: 131 MMOL/L (ref 135–147)
WBC # BLD AUTO: 20.51 THOUSAND/UL (ref 4.31–10.16)

## 2025-05-12 PROCEDURE — C1729 CATH, DRAINAGE: HCPCS

## 2025-05-12 PROCEDURE — 85007 BL SMEAR W/DIFF WBC COUNT: CPT

## 2025-05-12 PROCEDURE — 49406 IMAGE CATH FLUID PERI/RETRO: CPT | Performed by: STUDENT IN AN ORGANIZED HEALTH CARE EDUCATION/TRAINING PROGRAM

## 2025-05-12 PROCEDURE — 87070 CULTURE OTHR SPECIMN AEROBIC: CPT | Performed by: SURGERY

## 2025-05-12 PROCEDURE — 99232 SBSQ HOSP IP/OBS MODERATE 35: CPT | Performed by: INTERNAL MEDICINE

## 2025-05-12 PROCEDURE — 99152 MOD SED SAME PHYS/QHP 5/>YRS: CPT | Performed by: STUDENT IN AN ORGANIZED HEALTH CARE EDUCATION/TRAINING PROGRAM

## 2025-05-12 PROCEDURE — 99024 POSTOP FOLLOW-UP VISIT: CPT | Performed by: SURGERY

## 2025-05-12 PROCEDURE — 99152 MOD SED SAME PHYS/QHP 5/>YRS: CPT

## 2025-05-12 PROCEDURE — G0545 PR INHERENT VISIT TO INPT: HCPCS | Performed by: INTERNAL MEDICINE

## 2025-05-12 PROCEDURE — 80048 BASIC METABOLIC PNL TOTAL CA: CPT

## 2025-05-12 PROCEDURE — 87205 SMEAR GRAM STAIN: CPT | Performed by: SURGERY

## 2025-05-12 PROCEDURE — 85027 COMPLETE CBC AUTOMATED: CPT

## 2025-05-12 PROCEDURE — 0W9J30Z DRAINAGE OF PELVIC CAVITY WITH DRAINAGE DEVICE, PERCUTANEOUS APPROACH: ICD-10-PCS | Performed by: STUDENT IN AN ORGANIZED HEALTH CARE EDUCATION/TRAINING PROGRAM

## 2025-05-12 PROCEDURE — 87075 CULTR BACTERIA EXCEPT BLOOD: CPT | Performed by: SURGERY

## 2025-05-12 PROCEDURE — 99153 MOD SED SAME PHYS/QHP EA: CPT

## 2025-05-12 PROCEDURE — C1769 GUIDE WIRE: HCPCS

## 2025-05-12 RX ORDER — LIDOCAINE WITH 8.4% SOD BICARB 0.9%(10ML)
SYRINGE (ML) INJECTION AS NEEDED
Status: COMPLETED | OUTPATIENT
Start: 2025-05-12 | End: 2025-05-12

## 2025-05-12 RX ORDER — MIDAZOLAM HYDROCHLORIDE 2 MG/2ML
INJECTION, SOLUTION INTRAMUSCULAR; INTRAVENOUS AS NEEDED
Status: COMPLETED | OUTPATIENT
Start: 2025-05-12 | End: 2025-05-12

## 2025-05-12 RX ORDER — FENTANYL CITRATE 50 UG/ML
INJECTION, SOLUTION INTRAMUSCULAR; INTRAVENOUS AS NEEDED
Status: COMPLETED | OUTPATIENT
Start: 2025-05-12 | End: 2025-05-12

## 2025-05-12 RX ORDER — SODIUM CHLORIDE 9 MG/ML
10 INJECTION, SOLUTION INTRAMUSCULAR; INTRAVENOUS; SUBCUTANEOUS DAILY
Qty: 300 ML | Refills: 3 | Status: SHIPPED | OUTPATIENT
Start: 2025-05-12 | End: 2025-05-13

## 2025-05-12 RX ADMIN — FENTANYL CITRATE 25 MCG: 50 INJECTION INTRAMUSCULAR; INTRAVENOUS at 14:05

## 2025-05-12 RX ADMIN — FENTANYL CITRATE 25 MCG: 50 INJECTION INTRAMUSCULAR; INTRAVENOUS at 14:15

## 2025-05-12 RX ADMIN — CHLORHEXIDINE GLUCONATE 15 ML: 1.2 SOLUTION ORAL at 20:27

## 2025-05-12 RX ADMIN — MIDAZOLAM 0.5 MG: 1 INJECTION INTRAMUSCULAR; INTRAVENOUS at 14:35

## 2025-05-12 RX ADMIN — LEVOTHYROXINE SODIUM 75 MCG: 75 TABLET ORAL at 05:16

## 2025-05-12 RX ADMIN — ATORVASTATIN CALCIUM 10 MG: 10 TABLET, FILM COATED ORAL at 08:43

## 2025-05-12 RX ADMIN — CHLORHEXIDINE GLUCONATE 15 ML: 1.2 SOLUTION ORAL at 08:43

## 2025-05-12 RX ADMIN — FENTANYL CITRATE 25 MCG: 50 INJECTION INTRAMUSCULAR; INTRAVENOUS at 14:35

## 2025-05-12 RX ADMIN — MIDAZOLAM 0.5 MG: 1 INJECTION INTRAMUSCULAR; INTRAVENOUS at 15:14

## 2025-05-12 RX ADMIN — PANTOPRAZOLE SODIUM 40 MG: 40 TABLET, DELAYED RELEASE ORAL at 17:04

## 2025-05-12 RX ADMIN — FENTANYL CITRATE 25 MCG: 50 INJECTION INTRAMUSCULAR; INTRAVENOUS at 14:23

## 2025-05-12 RX ADMIN — MIDAZOLAM 0.5 MG: 1 INJECTION INTRAMUSCULAR; INTRAVENOUS at 14:05

## 2025-05-12 RX ADMIN — MIDAZOLAM 0.5 MG: 1 INJECTION INTRAMUSCULAR; INTRAVENOUS at 14:15

## 2025-05-12 RX ADMIN — PIPERACILLIN AND TAZOBACTAM 4.5 G: 36; 4.5 INJECTION, POWDER, LYOPHILIZED, FOR SOLUTION INTRAVENOUS at 17:07

## 2025-05-12 RX ADMIN — AMLODIPINE BESYLATE 2.5 MG: 2.5 TABLET ORAL at 08:43

## 2025-05-12 RX ADMIN — Medication 10 ML: at 14:20

## 2025-05-12 RX ADMIN — PANTOPRAZOLE SODIUM 40 MG: 40 TABLET, DELAYED RELEASE ORAL at 05:16

## 2025-05-12 RX ADMIN — PIPERACILLIN AND TAZOBACTAM 4.5 G: 36; 4.5 INJECTION, POWDER, LYOPHILIZED, FOR SOLUTION INTRAVENOUS at 20:27

## 2025-05-12 RX ADMIN — HYDROCHLOROTHIAZIDE 25 MG: 25 TABLET ORAL at 08:43

## 2025-05-12 RX ADMIN — OXYCODONE HYDROCHLORIDE 5 MG: 5 TABLET ORAL at 22:18

## 2025-05-12 RX ADMIN — MIDAZOLAM 0.5 MG: 1 INJECTION INTRAMUSCULAR; INTRAVENOUS at 14:22

## 2025-05-12 RX ADMIN — FENTANYL CITRATE 25 MCG: 50 INJECTION INTRAMUSCULAR; INTRAVENOUS at 15:14

## 2025-05-12 NOTE — PROGRESS NOTES
Progress Note - Infectious Disease   Name: Monica Avitia 80 y.o. female I MRN: [id number]  Unit/Bed#: W -01 I Date of Admission: 5/1/2025   Date of Service: 5/12/2025 I Hospital Day: 10    Assessment & Plan  Leukocytosis  Postoperative leukocytosis has been persistent and now up to 33.  No associated fevers.  Follow-up CT A/P from 5/6 with nonspecific findings which may be postoperative in etiology, without visible drainable collection.  IR input noted with original plan for paracentesis, however insufficient fluid present.  Patient has no peritoneal signs.  No evidence of surgical site infection.  No clinical or radiographic evidence to suggest pneumonia.  Negative UA. Patient otherwise remains afebrile and clinically stable after completion of appropriate postoperative antibiotic course.  Consider leukemoid reaction in the setting of recent surgery, significant postoperative anemia, possible intermittent urinary retention.  Negative C. difficile.  WBC count increased over the weekend.  Most recent CT 5/10 showed possible enteroenteric sinus tract and numerous organizing collections, consider abscesses.  Agree with plan for IR evaluation.  WBC count is trending down today off any antibiotics.  As patient remains otherwise afebrile and clinically stable, continue close observation off antibiotics pending further workup.   Continue close observation off any more antibiotics pending IR evaluation.   Follow-up IR evaluation scheduled for today.   Serial abdominal exams   Follow hemoglobin closely and transfuse as indicated   Surgery follow-up ongoing   Follow temperatures and hemodynamics   Recheck CBC in AM  Femoral hernia of right side with gangrene and obstruction  Patient presented with acute onset abdominal pain, nausea and vomiting.  CT showed SBO secondary to strangulated right groin hernia.  Status post ex lap with repair of strangulated femoral hernia and SBR on 5/2.  Small area of necrotic small  bowel was noted intraoperatively.  Patient received 5-day course of ceftriaxone/Flagyl.   Management per surgery service  Bladder distension  IR evaluation noted with concern for markedly distended urinary bladder.  Recent UA was negative.   Urinary retention protocol        Antibiotics:  Off antibiotic D6    Above plan was discussed with surgery service who agrees with ongoing close observation off antibiotics pending further workup.      Subjective   Patient underwent repeat CT over the weekend due to rising WBC count which showed numerous organizing collections throughout the abdomen and possible enteroenteric sinus tract.  Patient states she feels fine today.  Ambulated down the peralta.  No fevers or chills.  No worsening pain or new focal complaints.  States she is hungry.    Objective :  Temp:  [97.8 °F (36.6 °C)-98.5 °F (36.9 °C)] 98.3 °F (36.8 °C)  HR:  [89-98] 89  BP: (118-137)/(59-73) 118/59  Resp:  [16-18] 18  SpO2:  [94 %-98 %] 94 %  O2 Device: None (Room air)    General:  No acute distress  HEENT atraumatic normocephalic  Neck trachea midline  Psychiatric:  Awake and alert  Pulmonary:  Normal respiratory excursion without accessory muscle use  Abdomen: Nondistended with no rigidity or guarding  Extremities:  No edema  Skin:  No rashes  Neuro moves all extremities spontaneously      Lab Results: I have reviewed the following results:  Results from last 7 days   Lab Units 05/12/25  0517 05/11/25  0501 05/10/25  0425   WBC Thousand/uL 20.51* 28.50* 25.65*   HEMOGLOBIN g/dL 8.5* 8.9* 8.3*   PLATELETS Thousands/uL 508* 534* 451*     Results from last 7 days   Lab Units 05/12/25  0517 05/11/25  0732 05/11/25  0501   SODIUM mmol/L 131* 131* 129*   POTASSIUM mmol/L 4.4 4.2 6.2*   CHLORIDE mmol/L 96 96 97   CO2 mmol/L 25 26 27   BUN mg/dL 10 9 8   CREATININE mg/dL 0.39* 0.33* 0.33*   EGFR ml/min/1.73sq m 99 105 105   CALCIUM mg/dL 7.4* 7.5* 7.5*     Results from last 7 days   Lab Units 05/07/25  1501   C DIFF  TOXIN B BY PCR  Negative

## 2025-05-12 NOTE — SEDATION DOCUMENTATION
Procedure completed by Dr Smith. Pt tolerated without issues, VSS. Education provided to pt prior to and throughout procedure, questions answered as offered. Tube sutured, dry dressing to sites. Transported back to [facility] by IR staff, bedside report given.

## 2025-05-12 NOTE — DISCHARGE INSTRUCTIONS
"     TUBE CARE INSTRUCTIONS    Care after your procedure:    Resume your normal diet. Small sips of flat soda will help with nausea.    1. The properly functioning catheter should be forward flushed once (1x) daily with 10ml of normal saline using clean technique. You will be given a prescription for flushes.   To flush the tube, clean both connections with alcohol swab.Twist off the drainage bag/ bulb  tubing and twist the saline syringe into the drainage tube and flush. Remove the syringe and twist the drainage bag / bulb tubing tubing back on.    2. The drainage bag/bulb may be emptied as necessary. Keep a record of the amount of fluid you drain from your tube. This should be done with clean technique as well.     3. A fresh dressing should be applied daily over the tube insertion site.     4. As the tube is secured to the skin with only a suture,try not to pull on your tube. Tub baths are not permitted. Showers are permitted if the patient's skin entry site is prevented from getting wet. Similarly, washcloth \"baths\" are acceptable.     Contact Interventional Radiology at [phone] ([place] PATIENTS: Contact Interventional Radiology at [phone]) ([place] PATIENTS: Contact Interventional Radiology at [phone]) if:    1. Leakage or large amounts of liquid around the catheter.    2. Fever of 101 degrees lasting several hours without other obvious cause (such as sore throat, flu, etc).    3. Persistent nausea or vomiting.    4. Diminished drainage, which may be associated with pressure or pain. Or when the     drainage from your tube is less than 10mls for 48 hours.    5. Catheter pulled back or falls out.      The following pharmacies carry the flush syringes.       [facility]                     [facility] [address]                     [address] [place] [address]                         [phone]  Priyanka Gonzalez " PA  Phone [phone]            Phone [phone]                                                                                                       Cassia Orozco   [facility]             [facility]                                [phone]  [address] [place] [address] LEONARDO PATTERSON  Phone [phone]            Phone [phone]                      [facility]                                                                                                          [phone]  [facility]  [address]  Priyanka PATTERSON                                                                               [facility]  Phone [phone]

## 2025-05-12 NOTE — PLAN OF CARE
Problem: PAIN - ADULT  Goal: Verbalizes/displays adequate comfort level or baseline comfort level  Description: Interventions:- Encourage patient to monitor pain and request assistance- Assess pain using appropriate pain scale- Administer analgesics based on type and severity of pain and evaluate response- Implement non-pharmacological measures as appropriate and evaluate response- Consider cultural and social influences on pain and pain management- Notify physician/advanced practitioner if interventions unsuccessful or patient reports new pain  Outcome: Progressing     Problem: INFECTION - ADULT  Goal: Absence or prevention of progression during hospitalization  Description: INTERVENTIONS:- Assess and monitor for signs and symptoms of infection- Monitor lab/diagnostic results- Monitor all insertion sites, i.e. indwelling lines, tubes, and drains- Monitor endotracheal if appropriate and nasal secretions for changes in amount and color- [place] appropriate cooling/warming therapies per order- Administer medications as ordered- Instruct and encourage patient and family to use good hand hygiene technique- Identify and instruct in appropriate isolation precautions for identified infection/condition  Outcome: Progressing  Goal: Absence of fever/infection during neutropenic period  Description: INTERVENTIONS:- Monitor WBC  Outcome: Progressing     Problem: SAFETY ADULT  Goal: Patient will remain free of falls  Description: INTERVENTIONS:- Educate patient/family on patient safety including physical limitations- Instruct patient to call for assistance with activity - Consult OT/PT to assist with strengthening/mobility - Keep Call bell within reach- Keep bed low and locked with side rails adjusted as appropriate- Keep care items and personal belongings within reach- Initiate and maintain comfort rounds- Make Fall Risk Sign visible to staff- Offer Toileting every  Hours, in advance of need- Initiate/Maintain alarm- Obtain  necessary fall risk management equipment: - Apply yellow socks and bracelet for high fall risk patients- Consider moving patient to room near nurses station  Outcome: Progressing  Goal: Maintain or return to baseline ADL function  Description: INTERVENTIONS:-  Assess patient's ability to carry out ADLs; assess patient's baseline for ADL function and identify physical deficits which impact ability to perform ADLs (bathing, care of mouth/teeth, toileting, grooming, dressing, etc.)- Assess/evaluate cause of self-care deficits - Assess range of motion- Assess patient's mobility; develop plan if impaired- Assess patient's need for assistive devices and provide as appropriate- Encourage maximum independence but intervene and supervise when necessary- Involve family in performance of ADLs- Assess for home care needs following discharge - Consider OT consult to assist with ADL evaluation and planning for discharge- Provide patient education as appropriate  Outcome: Progressing  Goal: Maintains/Returns to pre admission functional level  Description: INTERVENTIONS:- Perform AM-PAC 6 Click Basic Mobility/ Daily Activity assessment daily.- Set and communicate daily mobility goal to care team and patient/family/caregiver. - Collaborate with rehabilitation services on mobility goals if consulted- Perform Range of Motion  times a day.- Reposition patient every  hours.- Dangle patient  times a day- Stand patient  times a day- Ambulate patient  times a day- Out of bed to chair  times a day - Out of bed for meals  times a day- Out of bed for toileting- Record patient progress and toleration of activity level   Outcome: Progressing     Problem: DISCHARGE PLANNING  Goal: Discharge to home or other facility with appropriate resources  Description: INTERVENTIONS:- Identify barriers to discharge w/patient and caregiver- Arrange for needed discharge resources and transportation as appropriate- Identify discharge learning needs (meds, wound  care, etc.)- Arrange for interpretive services to assist at discharge as needed- Refer to Case Management Department for coordinating discharge planning if the patient needs post-hospital services based on physician/advanced practitioner order or complex needs related to functional status, cognitive ability, or social support system  Outcome: Progressing     Problem: Knowledge Deficit  Goal: Patient/family/caregiver demonstrates understanding of disease process, treatment plan, medications, and discharge instructions  Description: Complete learning assessment and assess knowledge base.Interventions:- Provide teaching at level of understanding- Provide teaching via preferred learning methods  Outcome: Progressing     Problem: Prexisting or High Potential for Compromised Skin Integrity  Goal: Skin integrity is maintained or improved  Description: INTERVENTIONS:- Identify patients at risk for skin breakdown- Assess and monitor skin integrity- Assess and monitor nutrition and hydration status- Monitor labs - Assess for incontinence - Turn and reposition patient- Assist with mobility/ambulation- Relieve pressure over bony prominences- Avoid friction and shearing- Provide appropriate hygiene as needed including keeping skin clean and dry- Evaluate need for skin moisturizer/barrier cream- Collaborate with interdisciplinary team - Patient/family teaching- Consider wound care consult   Outcome: Progressing     Problem: Nutrition/Hydration-ADULT  Goal: Nutrient/Hydration intake appropriate for improving, restoring or maintaining nutritional needs  Description: Monitor and assess patient's nutrition/hydration status for malnutrition. Collaborate with interdisciplinary team and initiate plan and interventions as ordered.  Monitor patient's weight and dietary intake as ordered or per policy. Utilize nutrition screening tool and intervene as necessary. Determine patient's food preferences and provide high-protein, high-caloric  foods as appropriate. INTERVENTIONS:- Monitor oral intake, urinary output, labs, and treatment plans- Assess nutrition and hydration status and recommend course of action- Evaluate amount of meals eaten- Assist patient with eating if necessary - Allow adequate time for meals- Recommend/ encourage appropriate diets, oral nutritional supplements, and vitamin/mineral supplements- Order, calculate, and assess calorie counts as needed- Recommend, monitor, and adjust tube feedings and TPN/PPN based on assessed needs- Assess need for intravenous fluids- Provide specific nutrition/hydration education as appropriate- Include patient/family/caregiver in decisions related to nutrition  Outcome: Progressing

## 2025-05-12 NOTE — PLAN OF CARE
Problem: PAIN - ADULT  Goal: Verbalizes/displays adequate comfort level or baseline comfort level  Description: Interventions:- Encourage patient to monitor pain and request assistance- Assess pain using appropriate pain scale- Administer analgesics based on type and severity of pain and evaluate response- Implement non-pharmacological measures as appropriate and evaluate response- Consider cultural and social influences on pain and pain management- Notify physician/advanced practitioner if interventions unsuccessful or patient reports new pain  Outcome: Progressing     Problem: INFECTION - ADULT  Goal: Absence or prevention of progression during hospitalization  Description: INTERVENTIONS:- Assess and monitor for signs and symptoms of infection- Monitor lab/diagnostic results- Monitor all insertion sites, i.e. indwelling lines, tubes, and drains- Monitor endotracheal if appropriate and nasal secretions for changes in amount and color- [place] appropriate cooling/warming therapies per order- Administer medications as ordered- Instruct and encourage patient and family to use good hand hygiene technique- Identify and instruct in appropriate isolation precautions for identified infection/condition  Outcome: Progressing  Goal: Absence of fever/infection during neutropenic period  Description: INTERVENTIONS:- Monitor WBC  Outcome: Progressing     Problem: SAFETY ADULT  Goal: Patient will remain free of falls  Description: INTERVENTIONS:- Educate patient/family on patient safety including physical limitations- Instruct patient to call for assistance with activity - Consult OT/PT to assist with strengthening/mobility - Keep Call bell within reach- Keep bed low and locked with side rails adjusted as appropriate- Keep care items and personal belongings within reach- Initiate and maintain comfort rounds- Make Fall Risk Sign visible to staff- Offer Toileting every  Hours, in advance of need- Initiate/Maintain alarm- Obtain  necessary fall risk management equipment:- Apply yellow socks and bracelet for high fall risk patients- Consider moving patient to room near nurses station  Outcome: Progressing  Goal: Maintain or return to baseline ADL function  Description: INTERVENTIONS:-  Assess patient's ability to carry out ADLs; assess patient's baseline for ADL function and identify physical deficits which impact ability to perform ADLs (bathing, care of mouth/teeth, toileting, grooming, dressing, etc.)- Assess/evaluate cause of self-care deficits - Assess range of motion- Assess patient's mobility; develop plan if impaired- Assess patient's need for assistive devices and provide as appropriate- Encourage maximum independence but intervene and supervise when necessary- Involve family in performance of ADLs- Assess for home care needs following discharge - Consider OT consult to assist with ADL evaluation and planning for discharge- Provide patient education as appropriate  Outcome: Progressing  Goal: Maintains/Returns to pre admission functional level  Description: INTERVENTIONS:- Perform AM-PAC 6 Click Basic Mobility/ Daily Activity assessment daily.- Set and communicate daily mobility goal to care team and patient/family/caregiver. - Collaborate with rehabilitation services on mobility goals if consulted- Perform Range of Motion  times a day.- Reposition patient every  hours.- Dangle patient  times a day- Stand patient  times a day- Ambulate patient times a day- Out of bed to chair  times a day - Out of bed for meals times a day- Out of bed for toileting- Record patient progress and toleration of activity level   Outcome: Progressing     Problem: DISCHARGE PLANNING  Goal: Discharge to home or other facility with appropriate resources  Description: INTERVENTIONS:- Identify barriers to discharge w/patient and caregiver- Arrange for needed discharge resources and transportation as appropriate- Identify discharge learning needs (meds, wound  care, etc.)- Arrange for interpretive services to assist at discharge as needed- Refer to Case Management Department for coordinating discharge planning if the patient needs post-hospital services based on physician/advanced practitioner order or complex needs related to functional status, cognitive ability, or social support system  Outcome: Progressing     Problem: Knowledge Deficit  Goal: Patient/family/caregiver demonstrates understanding of disease process, treatment plan, medications, and discharge instructions  Description: Complete learning assessment and assess knowledge base.Interventions:- Provide teaching at level of understanding- Provide teaching via preferred learning methods  Outcome: Progressing     Problem: Prexisting or High Potential for Compromised Skin Integrity  Goal: Skin integrity is maintained or improved  Description: INTERVENTIONS:- Identify patients at risk for skin breakdown- Assess and monitor skin integrity- Assess and monitor nutrition and hydration status- Monitor labs - Assess for incontinence - Turn and reposition patient- Assist with mobility/ambulation- Relieve pressure over bony prominences- Avoid friction and shearing- Provide appropriate hygiene as needed including keeping skin clean and dry- Evaluate need for skin moisturizer/barrier cream- Collaborate with interdisciplinary team - Patient/family teaching- Consider wound care consult   Outcome: Progressing     Problem: Nutrition/Hydration-ADULT  Goal: Nutrient/Hydration intake appropriate for improving, restoring or maintaining nutritional needs  Description: Monitor and assess patient's nutrition/hydration status for malnutrition. Collaborate with interdisciplinary team and initiate plan and interventions as ordered.  Monitor patient's weight and dietary intake as ordered or per policy. Utilize nutrition screening tool and intervene as necessary. Determine patient's food preferences and provide high-protein, high-caloric  foods as appropriate. INTERVENTIONS:- Monitor oral intake, urinary output, labs, and treatment plans- Assess nutrition and hydration status and recommend course of action- Evaluate amount of meals eaten- Assist patient with eating if necessary - Allow adequate time for meals- Recommend/ encourage appropriate diets, oral nutritional supplements, and vitamin/mineral supplements- Order, calculate, and assess calorie counts as needed- Recommend, monitor, and adjust tube feedings and TPN/PPN based on assessed needs- Assess need for intravenous fluids- Provide specific nutrition/hydration education as appropriate- Include patient/family/caregiver in decisions related to nutrition  Outcome: Progressing

## 2025-05-12 NOTE — ASSESSMENT & PLAN NOTE
Postoperative leukocytosis has been persistent and now up to 33.  No associated fevers.  Follow-up CT A/P from 5/6 with nonspecific findings which may be postoperative in etiology, without visible drainable collection.  IR input noted with original plan for paracentesis, however insufficient fluid present.  Patient has no peritoneal signs.  No evidence of surgical site infection.  No clinical or radiographic evidence to suggest pneumonia.  Negative UA. Patient otherwise remains afebrile and clinically stable after completion of appropriate postoperative antibiotic course.  Consider leukemoid reaction in the setting of recent surgery, significant postoperative anemia, possible intermittent urinary retention.  Negative C. difficile.  WBC count increased over the weekend.  Most recent CT 5/10 showed possible enteroenteric sinus tract and numerous organizing collections, consider abscesses.  Agree with plan for IR evaluation.  WBC count is trending down today off any antibiotics.  As patient remains otherwise afebrile and clinically stable, continue close observation off antibiotics pending further workup.   Continue close observation off any more antibiotics pending IR evaluation.   Follow-up IR evaluation scheduled for today.   Serial abdominal exams   Follow hemoglobin closely and transfuse as indicated   Surgery follow-up ongoing   Follow temperatures and hemodynamics   Recheck CBC in AM

## 2025-05-12 NOTE — BRIEF OP NOTE (RAD/CATH)
INTERVENTIONAL RADIOLOGY PROCEDURE NOTE    Date: 5/12/2025    Procedure:   Procedure Summary       Date:  Room / Location:     Anesthesia Start:  Anesthesia Stop:     Procedure:  Diagnosis:     Scheduled Providers:  Responsible Provider:     Anesthesia Type: Not recorded ASA Status: Not recorded            Preoperative diagnosis:   1. SBO (small bowel obstruction) (HCC)    2. Right inguinal hernia    3. Incarcerated inguinal hernia    4. Hyponatremia    5. Hypokalemia         Postoperative diagnosis: Same.    Surgeon: Darvin Smith MD     Assistant: Rivera Way DO, PGY-2    Blood loss: Minimal    Specimens: LLQ fluid, pelvic fluid     Findings:   Trace perihepatic fluid as before.    Small LLQ fluid collection as seen on CT.  Uncomplicated US and CT guided placement of a 10.2 Fr locking pigtail drain into the collection, with attempted mechanical deloculation.  Output was thin light red fluid initially, transitioning to some fibrinous appearing debris and brownish fluid after deloculation.  Fluid sent for culture.    Deep pelvic collection as seen on CT.  Successful placement of CT-guided transgluteal drain into this collection.  Output was tan/maroon creamy fluid, suggestive of infected hematoma.  Fluid separately sent for culture.     Complications: None immediate.    Anesthesia: conscious sedation

## 2025-05-13 VITALS
DIASTOLIC BLOOD PRESSURE: 57 MMHG | HEART RATE: 89 BPM | TEMPERATURE: 97.8 F | WEIGHT: 148.6 LBS | RESPIRATION RATE: 16 BRPM | SYSTOLIC BLOOD PRESSURE: 128 MMHG | BODY MASS INDEX: 29.96 KG/M2 | HEIGHT: 59 IN | OXYGEN SATURATION: 95 %

## 2025-05-13 LAB
ANION GAP SERPL CALCULATED.3IONS-SCNC: 8 MMOL/L (ref 4–13)
BASOPHILS # BLD AUTO: 0.05 THOUSANDS/ÂΜL (ref 0–0.1)
BASOPHILS NFR BLD AUTO: 0 % (ref 0–1)
BUN SERPL-MCNC: 12 MG/DL (ref 5–25)
CALCIUM SERPL-MCNC: 7.6 MG/DL (ref 8.4–10.2)
CHLORIDE SERPL-SCNC: 95 MMOL/L (ref 96–108)
CO2 SERPL-SCNC: 30 MMOL/L (ref 21–32)
CREAT SERPL-MCNC: 0.5 MG/DL (ref 0.6–1.3)
EOSINOPHIL # BLD AUTO: 0.04 THOUSAND/ÂΜL (ref 0–0.61)
EOSINOPHIL NFR BLD AUTO: 0 % (ref 0–6)
ERYTHROCYTE [DISTWIDTH] IN BLOOD BY AUTOMATED COUNT: 15.3 % (ref 11.6–15.1)
GFR SERPL CREATININE-BSD FRML MDRD: 91 ML/MIN/1.73SQ M
GLUCOSE SERPL-MCNC: 96 MG/DL (ref 65–140)
HCT VFR BLD AUTO: 26.9 % (ref 34.8–46.1)
HGB BLD-MCNC: 8.5 G/DL (ref 11.5–15.4)
IMM GRANULOCYTES # BLD AUTO: 0.36 THOUSAND/UL (ref 0–0.2)
IMM GRANULOCYTES NFR BLD AUTO: 2 % (ref 0–2)
LYMPHOCYTES # BLD AUTO: 1.58 THOUSANDS/ÂΜL (ref 0.6–4.47)
LYMPHOCYTES NFR BLD AUTO: 9 % (ref 14–44)
MCH RBC QN AUTO: 28.2 PG (ref 26.8–34.3)
MCHC RBC AUTO-ENTMCNC: 31.6 G/DL (ref 31.4–37.4)
MCV RBC AUTO: 89 FL (ref 82–98)
MONOCYTES # BLD AUTO: 0.67 THOUSAND/ÂΜL (ref 0.17–1.22)
MONOCYTES NFR BLD AUTO: 4 % (ref 4–12)
NEUTROPHILS # BLD AUTO: 14.81 THOUSANDS/ÂΜL (ref 1.85–7.62)
NEUTS SEG NFR BLD AUTO: 85 % (ref 43–75)
NRBC BLD AUTO-RTO: 0 /100 WBCS
PLATELET # BLD AUTO: 582 THOUSANDS/UL (ref 149–390)
PMV BLD AUTO: 8.4 FL (ref 8.9–12.7)
POTASSIUM SERPL-SCNC: 4 MMOL/L (ref 3.5–5.3)
RBC # BLD AUTO: 3.01 MILLION/UL (ref 3.81–5.12)
SODIUM SERPL-SCNC: 133 MMOL/L (ref 135–147)
WBC # BLD AUTO: 17.51 THOUSAND/UL (ref 4.31–10.16)

## 2025-05-13 PROCEDURE — 99024 POSTOP FOLLOW-UP VISIT: CPT | Performed by: SURGERY

## 2025-05-13 PROCEDURE — 99232 SBSQ HOSP IP/OBS MODERATE 35: CPT | Performed by: INTERNAL MEDICINE

## 2025-05-13 PROCEDURE — 85025 COMPLETE CBC W/AUTO DIFF WBC: CPT | Performed by: PHYSICIAN ASSISTANT

## 2025-05-13 PROCEDURE — 80048 BASIC METABOLIC PNL TOTAL CA: CPT | Performed by: PHYSICIAN ASSISTANT

## 2025-05-13 PROCEDURE — G0545 PR INHERENT VISIT TO INPT: HCPCS | Performed by: INTERNAL MEDICINE

## 2025-05-13 RX ORDER — SODIUM CHLORIDE 9 MG/ML
10 INJECTION, SOLUTION INTRAMUSCULAR; INTRAVENOUS; SUBCUTANEOUS DAILY
Qty: 300 ML | Refills: 0 | Status: SHIPPED | OUTPATIENT
Start: 2025-05-13 | End: 2025-09-10

## 2025-05-13 RX ADMIN — PIPERACILLIN AND TAZOBACTAM 4.5 G: 36; 4.5 INJECTION, POWDER, LYOPHILIZED, FOR SOLUTION INTRAVENOUS at 04:19

## 2025-05-13 RX ADMIN — CHLORHEXIDINE GLUCONATE 15 ML: 1.2 SOLUTION ORAL at 08:35

## 2025-05-13 RX ADMIN — LEVOTHYROXINE SODIUM 75 MCG: 75 TABLET ORAL at 05:54

## 2025-05-13 RX ADMIN — HEPARIN SODIUM 5000 UNITS: 5000 INJECTION INTRAVENOUS; SUBCUTANEOUS at 05:54

## 2025-05-13 RX ADMIN — PANTOPRAZOLE SODIUM 40 MG: 40 TABLET, DELAYED RELEASE ORAL at 06:39

## 2025-05-13 RX ADMIN — HYDROCHLOROTHIAZIDE 25 MG: 25 TABLET ORAL at 08:35

## 2025-05-13 RX ADMIN — AMPICILLIN AND SULBACTAM 3 G: 10; 5 INJECTION, POWDER, FOR SOLUTION INTRAVENOUS at 11:23

## 2025-05-13 RX ADMIN — AMLODIPINE BESYLATE 2.5 MG: 2.5 TABLET ORAL at 08:35

## 2025-05-13 RX ADMIN — ATORVASTATIN CALCIUM 10 MG: 10 TABLET, FILM COATED ORAL at 08:35

## 2025-05-13 NOTE — DISCHARGE INSTR - AVS FIRST PAGE
Acute Care Surgery Discharge Instructions    Please follow-up as instructed. If you do not already have a follow-up appointment, please call the office when you leave to schedule an appointment to be seen in 2-3 weeks for post-operative re-evaluation.    Activity:  - No lifting greater than 20 pounds or strenuous physical activity or exercise for 2 weeks.  - Walking and normal light activities are encouraged.  - Normal daily activities including climbing steps are okay.  - No driving until no longer using pain medications.    Return to work:    - You may return to work in 2 weeks or sooner if you are feeling well enough.    Diet:    - You may resume your normal diet.    Wound Care:  - May shower daily. No tub baths or swimming until cleared by your surgeon.  - Wash incision gently with soap and water and pat dry.  - Do not apply any creams or ointments unless instructed to do so by your surgeon.  - You may apply ice as needed (no longer than 20 minutes at a time) for the first 48 hours.  - Bruising is not unusual and will go away with a little time. You may apply a warm, moist compress that may help the bruising resolve quicker.    Medications:    - You may resume all of your regular medications after discharge unless otherwise instructed. Please refer to your discharge medication list for further details.  - Please take the pain medications as directed.  - You are encouraged to use non-narcotic pain medications first and whenever possible. Reserve the use of narcotic pain medication for moderate to severe pain not controlled by non-narcotic medications.  - No driving while taking narcotic pain medications.  - You may become constipated, especially if taking pain medications. You may take any over the counter stool softeners or laxatives as needed. Examples: Milk of Magnesia, Colace, Senna.    Additional Instructions:  - If you have any questions or concerns after discharge please call the office.  - Call office or  return to ER if fever greater than 101, chills, persistent nausea/vomiting, worsening/uncontrollable pain, and/or increasing redness or purulent/foul smelling drainage from incision(s).

## 2025-05-13 NOTE — ASSESSMENT & PLAN NOTE
Most recent CT 5/10 showed numerous organizing collections concerning for possible developing abscesses.  Status post IR drain placement x 2 on 5/12.  Clinical concern for infected hematoma.  Multiple cultures were sent, negative thus far.  Overall low suspicion for resistant infection.   Change Zosyn to IV Unasyn for now given ongoing clinical stability   Follow-up IR cultures from 5/12 until finalized to guide further antibiotic recommendations   Drain management   Surgery follow-up ongoing

## 2025-05-13 NOTE — ASSESSMENT & PLAN NOTE
Postoperative leukocytosis has been persistent and now up to 33.  No associated fevers.  Follow-up CT A/P from 5/6 with nonspecific findings which may be postoperative in etiology, without visible drainable collection.  IR input noted with original plan for paracentesis, however insufficient fluid present.  Patient has no peritoneal signs.  No evidence of surgical site infection.  No clinical or radiographic evidence to suggest pneumonia.  Negative UA. Patient otherwise remains afebrile and clinically stable after completion of appropriate postoperative antibiotic course.  Consider leukemoid reaction in the setting of recent surgery, significant postoperative anemia, possible intermittent urinary retention.  Negative C. difficile.  WBC count increased over the weekend.  Most recent CT 5/10 showed possible enteroenteric sinus tract and numerous organizing collections with consideration for developing abscesses, as below.  Patient otherwise remains afebrile.  WBC count is downtrending today.   Antibiotic plan as below   Serial abdominal exams   Follow hemoglobin closely and transfuse as indicated   Surgery follow-up ongoing   Follow temperatures and hemodynamics   Recheck CBC in AM

## 2025-05-13 NOTE — CASE MANAGEMENT
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 2025     SBO (small bowel obstruction) (HCC) 2025     Syncope 2025     Prediabetes 2025     Pulmonary fibrosis, unspecified ([facility]) 2020     Seasonal allergies 2019     Diverticulosis of intestine without bleeding 2019     Diverticulosis 2016     Internal hemorrhoids 2016     Osteopenia of multiple sites 2016     Neuropathy 2015     Malignant neoplasm of colon (HCC) 2015     Essential hypertension 10/02/2013     Vitamin D deficiency 10/02/2013     Other hyperlipidemia 2013     Acquired hypothyroidism 2013     Overweight 2013       LOS (days): 11  Geometric Mean LOS (GMLOS) (days): 9.6  Days to GMLOS:-1.8     OBJECTIVE:  Risk of Unplanned Readmission Score: 18.98         Current admission status: Inpatient   Preferred Pharmacy:   [facility] [address] [place] [address]  Phone: [phone] Fax: [phone]    Primary Care Provider: Jeimy Torres MD    Primary Insurance: [employer]  Secondary Insurance:     DISCHARGE DETAILS:       Requested Home Health Care         [facility]    DME Referral Provided  Referral made for DME?: No    Treatment Team Recommendation: Home with Home Health Care  Discharge Destination Plan:: Home with Home Health Care      Pt is in need of SN, PT, OT at home.  [facility] has accepted pt.   Pt is stable for DC today.  Contact information for [facility] is on AVS.

## 2025-05-13 NOTE — PROGRESS NOTES
Progress Note - Surgery-General   Name: Monica Avitia 80 y.o. 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 Cx to follow  Intra-abdominal abscess (HCC)          Subjective   Feels well. Tolerating diet. +bms    Objective :  Temp:  [98.3 °F (36.8 °C)-99.2 °F (37.3 °C)] 99.2 °F (37.3 °C)  HR:  [86-99] 90  BP: (107-156)/(53-73) 125/56  Resp:  [15-22] 16  SpO2:  [92 %-100 %] 92 %  O2 Device: None (Room air)    I/O         05/11 0701 05/12 0700 05/12 0701 05/13 0700    P.O. 300 0    Total Intake(mL/kg) 300 (4.5) 0 (0)    Urine (mL/kg/hr)  0 (0)    Emesis/NG output      Drains  42    Stool      Total Output  42    Net +300 -42          Unmeasured Urine Occurrence 3 x 2 x    Unmeasured Stool Occurrence 1 x     Unmeasured Emesis Occurrence 0 x           Lines/Drains/Airways       Active Status       Name Placement date Placement time Site Days    Abscess Drain Abdomen 05/12/25  1428  Abdomen  less than 1    Abscess Drain Buttock 05/12/25  1510  Buttock  less than 1                  Physical Exam  General: NAD  CV: nl rate  Lungs: nl wob. No resp distress.  Chest: no lesions  ABD: Soft, non tender, ND  Extrem: No CCE  Incisions: CDI      Lab Results: I have reviewed the following results:  Recent Labs     05/11/25  0501 05/11/25  0732 05/12/25  0517   WBC 28.50*  --  20.51*   HGB 8.9*  --  8.5*   HCT 27.3*  --  25.6*   *  --  508*   BANDSPCT  --   --  1   SODIUM 129*   < > 131*    K 6.2*   < > 4.4   CL 97   < > 96   CO2 27   < > 25   BUN 8   < > 10   CREATININE 0.33*   < > 0.39*   GLUC 73   < > 70   MG 2.0  --   --     < > = values in this interval not displayed.             VTE Pharmacologic Prophylaxis: VTE covered by:  heparin (porcine), Subcutaneous, 5,000 Units at 05/11/25 2976     VTE Mechanical Prophylaxis: sequential compression device

## 2025-05-13 NOTE — PROGRESS NOTES
Progress Note - Infectious Disease   Name: Monica Avitia 80 y.o. female I MRN: [id number]  Unit/Bed#: W -01 I Date of Admission: 5/1/2025   Date of Service: 5/13/2025 I Hospital Day: 11    Assessment & Plan  Leukocytosis  Postoperative leukocytosis has been persistent and now up to 33.  No associated fevers.  Follow-up CT A/P from 5/6 with nonspecific findings which may be postoperative in etiology, without visible drainable collection.  IR input noted with original plan for paracentesis, however insufficient fluid present.  Patient has no peritoneal signs.  No evidence of surgical site infection.  No clinical or radiographic evidence to suggest pneumonia.  Negative UA. Patient otherwise remains afebrile and clinically stable after completion of appropriate postoperative antibiotic course.  Consider leukemoid reaction in the setting of recent surgery, significant postoperative anemia, possible intermittent urinary retention.  Negative C. difficile.  WBC count increased over the weekend.  Most recent CT 5/10 showed possible enteroenteric sinus tract and numerous organizing collections with consideration for developing abscesses, as below.  Patient otherwise remains afebrile.  WBC count is downtrending today.   Antibiotic plan as below   Serial abdominal exams   Follow hemoglobin closely and transfuse as indicated   Surgery follow-up ongoing   Follow temperatures and hemodynamics   Recheck CBC in AM  Intra-abdominal abscess (HCC)  Most recent CT 5/10 showed numerous organizing collections concerning for possible developing abscesses.  Status post IR drain placement x 2 on 5/12.  Clinical concern for infected hematoma.  Multiple cultures were sent, negative thus far.  Overall low suspicion for resistant infection.   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  CT showed SBO secondary to strangulated right groin hernia.  Status post ex lap with repair of strangulated femoral hernia and SBR on 5/2.  Small area of necrotic small bowel was noted intraoperatively.  Patient received 5-day course of ceftriaxone/Flagyl.   Management per surgery service  Bladder distension  IR evaluation noted with concern for markedly distended urinary bladder.  Recent UA was negative.   Urinary retention protocol        Antibiotics:  Post drain placement D1  Zosyn    Above plan was discussed with surgery service who agrees with continuation of antibiotic pending cultures.      Subjective   Patient was seen by IR yesterday and underwent drain placement x 2.  States she feels well today.  No fevers, chills, nausea, vomiting.  Tolerating oral intake.  She is in good spirits.    Objective :  Temp:  [97.8 °F (36.6 °C)-99.2 °F (37.3 °C)] 97.8 °F (36.6 °C)  HR:  [86-99] 89  BP: (107-156)/(53-70) 128/57  Resp:  [15-22] 16  SpO2:  [92 %-100 %] 95 %    General:  No acute distress, sitting comfortably in chair  HEENT atraumatic normocephalic  Neck trachea midline  Psychiatric:  Awake and alert  Pulmonary:  Normal respiratory excursion without accessory muscle use  Abdomen:  Soft, nontender  Drains x 2 in place, 1 with serous output and another with bloody output  Extremities:  No edema  Skin:  No rashes  Neuro moves all extremity spontaneously      Lab Results: I have reviewed the following results:  Results from last 7 days   Lab Units 05/13/25  0435 05/12/25  0517 05/11/25  0501   WBC Thousand/uL 17.51* 20.51* 28.50*   HEMOGLOBIN g/dL 8.5* 8.5* 8.9*   PLATELETS Thousands/uL 582* 508* 534*     Results from last 7 days   Lab Units 05/13/25  0435 05/12/25  0517 05/11/25  0732   SODIUM mmol/L 133* 131* 131*   POTASSIUM mmol/L 4.0 4.4 4.2   CHLORIDE mmol/L 95*  96 96   CO2 mmol/L 30 25 26   BUN mg/dL 12 10 9   CREATININE mg/dL 0.50* 0.39* 0.33*   EGFR ml/min/1.73sq m 91 99 105   CALCIUM mg/dL 7.6* 7.4* 7.5*     Results from last 7 days   Lab Units 05/12/25  1516 05/12/25  1448 05/07/25  1501   GRAM STAIN RESULT  3+ Polys  No organisms seen  --   --    BODY FLUID CULTURE, STERILE  No growth No growth  --    C DIFF TOXIN B BY PCR   --   --  Negative

## 2025-05-13 NOTE — DISCHARGE SUMMARY
Discharge Summary - Surgery-General   Name: Monica Avitia 80 y.o. female I MRN: [id number]  Unit/Bed#: W -01 I Date of Admission: 5/1/2025   Date of Service: 5/13/2025 I Hospital Day: 11  { ?Quick Links I Problem List I PORCH I Billing Tip:72755}  Admission Date: 5/1/2025 2036  Discharge Date: 05/13/25  Admitting Diagnosis: Hypokalemia [E87.6]  Hyponatremia [E87.1]  SBO (small bowel obstruction) (HCC) [K56.609]  Right inguinal hernia [K40.90]  Incarcerated inguinal hernia [K40.30]  Discharge Diagnosis:   Medical Problems       Resolved Problems  Date Reviewed: 5/2/2025          Resolved    Hypotension 5/4/2025     Resolved by  Philomena Maradiaga PA-C          HPI per Brandan Almonte MD: Monica Avitia is a 80 y.o. female with a history of HTN, RIHR in 1989, right hemicolectomy for colon cancer, hypothyroidism who presents with one day of nausea, vomiting, abdominal pain. The pain started this morning while she was visiting her  who is currently admitted at this hospital. The pain continued to worsen prompting her to seek evaluation. She denies prior similar episodes. No fevers or chills. Last bowel movement yesterday. Not on AC/AP medications. Last colonoscopy in 2019 with healthy-appearing end-to-side ileocolonic anastomosis. Sigmoid diverticulosis and internal hemorrhoids. CT AP was obtained which showed a small bowel obstruction secondary to entrapment of the bowel loop within a right groin hernia. She is independent in her ADLs and physically active. Otherwise in good health prior to this event. Labs notable for a WBC of 15.6, Hgb 14, Cr 0.68. Attempted bedside reduction of groin hernia which was not successful.     Procedures Performed: No orders of the defined types were placed in this encounter.      Summary of Hospital Course:  Patient was admitted to the surgical service, and n.p.o. and NG tube was placed and taken to the OR for hernia repair. Intra-operative strangulated right  femoral hernia with small area of necrotic bowel was found, small bowel resection was performed.  Patient was admitted to the [facility] floor postoperatively.  The evening of postop day 0.  Patient was a rapid response for syncopal episode while having a bowel movement.  Patient was hypotensive.  Patient was upgraded the ICU for close monitoring.  Patient received 1 unit PRBC for drop in hemoglobin and bloody stools.  Patient had a second syncopal episode while in the ICU.  Postop day 1 patient reports passing flatus.  Incision clean dry intact.  Clamp trial was performed on NG tube, this was removed and patient was started on clear liquid diet.  Overnight patient had episode of nausea vomiting and abdominal pain, this quickly resolved in the morning postop day 2 patient was feeling better.  Patient was allowed clear liquid diet with toast and crackers.  Postop day 4 patient was increasing white blood cell count, CT CAP was performed; few foci of gas next of MS and anastomosis, no drainable gas/fluid collection with partially loculated mild ascites.  Small bilateral effusions and over distended gallbladder with sludge and stones.  Postop day 5 WBC remained elevated.  IR was consulted felt small area of paracolic gutter fluid could be aspirated, however deep pelvic fluid was inaccessible.  ID was also consulted for elevated WBC.  When patient presented to IR suite, trace perihepatic fluid seen however no other free fluid visualized on the ultrasound of the abdomen.  Markedly distended urinary bladder.  No aspiration/drainage was attempted given lack of fluid.  Infectious disease recommended close observation off antibiotics.  Postop day 6 WBC slightly improved, patient tolerating regular diet.  Drop in hemoglobin to 6.7, patient was transfused 1 unit of blood.  Postop day 7 patient passing large amount of flatus and looser stools, C. difficile has been negative.  Continue on a regular diet.  Postop day 8 patient with  decreased p.o. intake, repeat CAT scan was performed; the near tract containing gas and fluid extending from the anastomosis concerning for sinus tract however no extravasated oral contrast was seen.  Numerous organizing collections throughout the abdomen pelvis.  IR was reconsulted, postop day 9, 2 intra-abdominal drains were placed for pelvic fluid drainage, both fluid samples sent for culture.  Clinically suspected infected hematoma?  Postop day 10 patient was started on Zosyn for concern for infected hematoma, which was then switched to IV Unasyn per ID.  However patient clinically well-appearing, tolerating a diet, and decreasing WBC patient was deemed appropriate for discharge home, on 4 days of oral Augmentin with outpatient follow-up with IR and general surgery.  Patient was educated on drain care, VNA was established with the assistance of case management.     IR drain cultures 5/12      Significant Findings, Care, Treatment and Services Provided: IR drain placement for intra-abdominal collections.    Complications: Syncope; treated with fluid resuscitation, blood transfusion, ICU monitoring  Acute blood loss anemia; treated with blood transfusion    Condition at Discharge: stable       Discharge instructions/Information to patient and family:   See After Visit Summary (AVS) for information provided to patient and family.      Provisions for Follow-Up Care:  See after visit summary for information related to follow-up care and any pertinent home health orders.      PCP: Jeimy Torres MD    Disposition: Home with VNA    Planned Readmission: No     Discharge Medications:  See after visit summary for reconciled discharge medications provided to patient and family.      Discharge Statement:  I have spent a total time of 30 minutes in caring for this patient on the day of the visit/encounter. {>30 minutes of time was spent on:72943}.

## 2025-05-13 NOTE — ASSESSMENT & PLAN NOTE
5/2 ex-lap, reduction, SBR for strangulated femoral hernia  5/7 attempted IR aspiration for intrabdominal fluid, no drainage  5/12 IR drain x2  -  pelvic transgluteal drain: infected hematoma  - LLQ drain: trace perihepatic fluid  Clinically looking well    Plan  - Reg diet  - ID: appreciate updated recs  - On zosyn since yesterday after infected hematoma seen on IR drain. Cx to follow   Patient Call Alvarez she is calling back;          Call back needed? Yes    Return Call Needed  Same as documented in contacts section  When to return call?: Same day: Route High Priority     Returned Orin's call and she is feeling better since she started treatment for a UTI. She would like to re-schedule her missed appointments from 3/18 and 3/22. Message sent to scheduling.   Returned call to Orin but got her voice mail. Left my number and message.   Show Aperture Variable?: Yes Duration Of Freeze Thaw-Cycle (Seconds): 3 Render Note In Bullet Format When Appropriate: No Detail Level: Simple Post-Care Instructions: I reviewed with the patient in detail post-care instructions. Patient is to wear sunprotection, and avoid picking at any of the treated lesions. Pt may apply Vaseline to crusted or scabbing areas. Consent: The patient's consent was obtained including but not limited to risks of crusting, scabbing, blistering, scarring, darker or lighter pigmentary change, recurrence, incomplete removal and infection.

## 2025-05-13 NOTE — PLAN OF CARE
Problem: INFECTION - ADULT  Goal: Absence or prevention of progression during hospitalization  Description: INTERVENTIONS:- Assess and monitor for signs and symptoms of infection- Monitor lab/diagnostic results- Monitor all insertion sites, i.e. indwelling lines, tubes, and drains- Monitor endotracheal if appropriate and nasal secretions for changes in amount and color- [place] appropriate cooling/warming therapies per order- Administer medications as ordered- Instruct and encourage patient and family to use good hand hygiene technique- Identify and instruct in appropriate isolation precautions for identified infection/condition  Outcome: Progressing  Goal: Absence of fever/infection during neutropenic period  Description: INTERVENTIONS:- Monitor WBC  Outcome: Progressing     Problem: SAFETY ADULT  Goal: Patient will remain free of falls  Description: INTERVENTIONS:- Educate patient/family on patient safety including physical limitations- Instruct patient to call for assistance with activity - Consult OT/PT to assist with strengthening/mobility - Keep Call bell within reach- Keep bed low and locked with side rails adjusted as appropriate- Keep care items and personal belongings within reach- Initiate and maintain comfort rounds- Make Fall Risk Sign visible to staff- Offer Toileting every 2 Hours, in advance of need- Initiate/Maintain bed alarm- Obtain necessary fall risk management equipment: alarms- Apply yellow socks and bracelet for high fall risk patients- Consider moving patient to room near nurses station  Outcome: Progressing  Goal: Maintain or return to baseline ADL function  Description: INTERVENTIONS:-  Assess patient's ability to carry out ADLs; assess patient's baseline for ADL function and identify physical deficits which impact ability to perform ADLs (bathing, care of mouth/teeth, toileting, grooming, dressing, etc.)- Assess/evaluate cause of self-care deficits - Assess range of motion- Assess  patient's mobility; develop plan if impaired- Assess patient's need for assistive devices and provide as appropriate- Encourage maximum independence but intervene and supervise when necessary- Involve family in performance of ADLs- Assess for home care needs following discharge - Consider OT consult to assist with ADL evaluation and planning for discharge- Provide patient education as appropriate  Outcome: Progressing  Goal: Maintains/Returns to pre admission functional level  Description: INTERVENTIONS:- Perform AM-PAC 6 Click Basic Mobility/ Daily Activity assessment daily.- Set and communicate daily mobility goal to care team and patient/family/caregiver. - Collaborate with rehabilitation services on mobility goals if consulted- Perform Range of Motion 4 times a day.- Reposition patient every 2 hours.- Dangle patient 4 times a day- Stand patient 4 times a day- Ambulate patient 4 times a day- Out of bed to chair 4 times a day - Out of bed for meals 3 times a day- Out of bed for toileting- Record patient progress and toleration of activity level   Outcome: Progressing

## 2025-05-14 ENCOUNTER — TRANSITIONAL CARE MANAGEMENT (OUTPATIENT)
Dept: INTERNAL MEDICINE CLINIC | Facility: CLINIC | Age: 80
End: 2025-05-14

## 2025-05-14 NOTE — UTILIZATION REVIEW
NOTIFICATION OF ADMISSION DISCHARGE   This is a Notification of Discharge from [facility]. Please be advised that this patient has been discharge from our facility. Below you will find the admission and discharge date and time including the patient’s disposition.   UTILIZATION REVIEW CONTACT:  Utilization Review Assistants  Network Utilization Review Department  Phone: [phone] x carefully listen to the prompts. All voicemails are confidential.  Email: [email or web address]     ADMISSION INFORMATION  PRESENTATION DATE: 5/1/2025  8:36 PM  OBERVATION ADMISSION DATE: N/A  INPATIENT ADMISSION DATE: 5/2/25  1:15 AM   DISCHARGE DATE: 5/13/2025  5:04 PM   DISPOSITION:Home with Home Health Care    [facility] Utilization Review Department  ATTENTION: Please call with any questions or concerns to [phone] and carefully listen to the prompts so that you are directed to the right person. All voicemails are confidential.   For Discharge needs, contact Care Management DC Support Team at [phone] opt. 2  Send all requests for admission clinical reviews, approved or denied determinations and any other requests to dedicated fax number below belonging to the campus where the patient is receiving treatment. List of dedicated fax numbers for the Facilities:  FACILITY NAME UR FAX NUMBER   ADMISSION DENIALS (Administrative/Medical Necessity) [phone]   DISCHARGE SUPPORT TEAM ([facility]) [phone]   PARENT CHILD HEALTH (Maternity/NICU/Pediatrics) [phone]   [facility] [phone]   [facility] [phone]   [facility] [phone]   [facility] [phone]   [facility] [phone]   [facility] [phone]   [facility] [phone]   [facility] [phone]   [facility]  [facility] [place] [phone]   [facility] [phone]   [facility] [phone]   [facility] [phone]

## 2025-05-15 ENCOUNTER — HOME CARE VISIT (OUTPATIENT)
Dept: HOME HEALTH SERVICES | Facility: HOME HEALTHCARE | Age: 80
End: 2025-05-15
Attending: SURGERY
Payer: COMMERCIAL

## 2025-05-15 ENCOUNTER — TELEPHONE (OUTPATIENT)
Age: 80
End: 2025-05-15

## 2025-05-15 ENCOUNTER — HOME CARE VISIT (OUTPATIENT)
Dept: HOME HEALTH SERVICES | Facility: HOME HEALTHCARE | Age: 80
End: 2025-05-15
Payer: COMMERCIAL

## 2025-05-15 VITALS
RESPIRATION RATE: 18 BRPM | HEART RATE: 110 BPM | DIASTOLIC BLOOD PRESSURE: 62 MMHG | SYSTOLIC BLOOD PRESSURE: 128 MMHG | OXYGEN SATURATION: 96 % | TEMPERATURE: 98.7 F

## 2025-05-15 LAB
BACTERIA SPEC ANAEROBE CULT: NO GROWTH
BACTERIA SPEC BFLD CULT: NO GROWTH
BACTERIA SPEC BFLD CULT: NO GROWTH
GRAM STN SPEC: NORMAL

## 2025-05-15 PROCEDURE — G0299 HHS/HOSPICE OF RN EA 15 MIN: HCPCS

## 2025-05-15 PROCEDURE — 400013 VN SOC

## 2025-05-15 NOTE — TELEPHONE ENCOUNTER
FYI: Maribel at [facility]#[phone] ext.  [phone] stated that pt has a gap in care for hypertension.

## 2025-05-16 LAB — BACTERIA SPEC ANAEROBE CULT: NO GROWTH

## 2025-05-17 ENCOUNTER — TELEPHONE (OUTPATIENT)
Dept: RADIOLOGY | Facility: HOSPITAL | Age: 80
End: 2025-05-17

## 2025-05-19 ENCOUNTER — HOME CARE VISIT (OUTPATIENT)
Dept: HOME HEALTH SERVICES | Facility: HOME HEALTHCARE | Age: 80
End: 2025-05-19
Payer: COMMERCIAL

## 2025-05-19 PROCEDURE — G0299 HHS/HOSPICE OF RN EA 15 MIN: HCPCS

## 2025-05-20 ENCOUNTER — OFFICE VISIT (OUTPATIENT)
Dept: SURGERY | Facility: CLINIC | Age: 80
End: 2025-05-20

## 2025-05-20 VITALS
SYSTOLIC BLOOD PRESSURE: 121 MMHG | TEMPERATURE: 97.1 F | DIASTOLIC BLOOD PRESSURE: 79 MMHG | OXYGEN SATURATION: 92 % | HEART RATE: 81 BPM | RESPIRATION RATE: 18 BRPM

## 2025-05-20 DIAGNOSIS — K56.609 SBO (SMALL BOWEL OBSTRUCTION) (HCC): ICD-10-CM

## 2025-05-20 DIAGNOSIS — K43.2 INCISIONAL HERNIA: Primary | ICD-10-CM

## 2025-05-20 PROCEDURE — G0180 MD CERTIFICATION HHA PATIENT: HCPCS | Performed by: SURGERY

## 2025-05-20 PROCEDURE — 99024 POSTOP FOLLOW-UP VISIT: CPT | Performed by: SURGERY

## 2025-05-20 NOTE — PROGRESS NOTES
Name: Monica Avitia      : 1945      MRN: [id number]  Encounter Provider: Levi Verdugo MD  Encounter Date: 2025   Encounter department: [facility] GENERAL SURGERY [place]  :  Assessment & Plan  Incisional hernia    Orders:    CBC and differential; Future    SBO (small bowel obstruction) (HCC)             History of Present Illness   HPI  Monica Avitia is a 80 y.o. female who presents post operatively.  Repair strangulated femoral hernia, small bowel resection 2025.  Please see discharge summary for details.  2 drains were placed for small fluid collections that just her anastomosis by radiology.  It was felt they were secondary to hematoma.    She returns today for follow-up visit.  Overall she feels well.  She denies any discomfort.  Incision is clean and healing well.  IR drains were removed as they have had no drainage..  She tolerated this well.  Activity instructions provided.  Follow-up CBC is anticipated.      Review of Systems       Objective   There were no vitals taken for this visit.     Physical Exam    Skin:     General: Skin is warm and dry.      Comments: Abdomen soft and nontender.  Radiology drains removed.

## 2025-07-02 DIAGNOSIS — E78.49 OTHER HYPERLIPIDEMIA: ICD-10-CM

## 2025-07-02 DIAGNOSIS — I10 ESSENTIAL HYPERTENSION: ICD-10-CM

## 2025-07-02 DIAGNOSIS — E03.9 ACQUIRED HYPOTHYROIDISM: ICD-10-CM

## 2025-07-03 RX ORDER — LEVOTHYROXINE SODIUM 75 UG/1
75 TABLET ORAL EVERY MORNING
Qty: 90 TABLET | Refills: 0 | Status: SHIPPED | OUTPATIENT
Start: 2025-07-03

## 2025-07-03 RX ORDER — ATORVASTATIN CALCIUM 10 MG/1
10 TABLET, FILM COATED ORAL DAILY
Qty: 90 TABLET | Refills: 0 | OUTPATIENT
Start: 2025-07-03

## 2025-07-03 RX ORDER — HYDROCHLOROTHIAZIDE 25 MG/1
25 TABLET ORAL EVERY MORNING
Qty: 90 TABLET | Refills: 0 | OUTPATIENT
Start: 2025-07-03

## 2025-07-03 RX ORDER — AMLODIPINE BESYLATE 2.5 MG/1
2.5 TABLET ORAL EVERY MORNING
Qty: 90 TABLET | Refills: 0 | OUTPATIENT
Start: 2025-07-03

## 2025-07-16 ENCOUNTER — APPOINTMENT (OUTPATIENT)
Dept: LAB | Facility: CLINIC | Age: 80
End: 2025-07-16
Attending: INTERNAL MEDICINE
Payer: COMMERCIAL

## 2025-07-16 ENCOUNTER — OFFICE VISIT (OUTPATIENT)
Dept: INTERNAL MEDICINE CLINIC | Facility: CLINIC | Age: 80
End: 2025-07-16
Payer: COMMERCIAL

## 2025-07-16 VITALS
WEIGHT: 129.6 LBS | OXYGEN SATURATION: 95 % | SYSTOLIC BLOOD PRESSURE: 160 MMHG | TEMPERATURE: 98.3 F | HEART RATE: 80 BPM | DIASTOLIC BLOOD PRESSURE: 84 MMHG | HEIGHT: 59 IN | RESPIRATION RATE: 14 BRPM | BODY MASS INDEX: 26.13 KG/M2

## 2025-07-16 DIAGNOSIS — E66.3 OVERWEIGHT: ICD-10-CM

## 2025-07-16 DIAGNOSIS — K65.1 INTRA-ABDOMINAL ABSCESS (HCC): ICD-10-CM

## 2025-07-16 DIAGNOSIS — E78.49 OTHER HYPERLIPIDEMIA: ICD-10-CM

## 2025-07-16 DIAGNOSIS — E03.9 ACQUIRED HYPOTHYROIDISM: ICD-10-CM

## 2025-07-16 DIAGNOSIS — I10 ESSENTIAL HYPERTENSION: ICD-10-CM

## 2025-07-16 DIAGNOSIS — J84.10 PULMONARY FIBROSIS, UNSPECIFIED (HCC): ICD-10-CM

## 2025-07-16 DIAGNOSIS — K43.2 INCISIONAL HERNIA: ICD-10-CM

## 2025-07-16 DIAGNOSIS — K56.609 SBO (SMALL BOWEL OBSTRUCTION) (HCC): Primary | ICD-10-CM

## 2025-07-16 DIAGNOSIS — Z00.00 MEDICARE ANNUAL WELLNESS VISIT, SUBSEQUENT: ICD-10-CM

## 2025-07-16 DIAGNOSIS — M85.89 OSTEOPENIA OF MULTIPLE SITES: ICD-10-CM

## 2025-07-16 PROBLEM — R55 SYNCOPE: Status: RESOLVED | Noted: 2025-05-02 | Resolved: 2025-07-16

## 2025-07-16 LAB
ALBUMIN SERPL BCG-MCNC: 4.2 G/DL (ref 3.5–5)
ALP SERPL-CCNC: 58 U/L (ref 34–104)
ALT SERPL W P-5'-P-CCNC: 15 U/L (ref 7–52)
ANION GAP SERPL CALCULATED.3IONS-SCNC: 6 MMOL/L (ref 4–13)
AST SERPL W P-5'-P-CCNC: 20 U/L (ref 13–39)
BASOPHILS # BLD AUTO: 0.04 THOUSANDS/ÂΜL (ref 0–0.1)
BASOPHILS NFR BLD AUTO: 0 % (ref 0–1)
BILIRUB SERPL-MCNC: 0.42 MG/DL (ref 0.2–1)
BUN SERPL-MCNC: 13 MG/DL (ref 5–25)
CALCIUM SERPL-MCNC: 9.4 MG/DL (ref 8.4–10.2)
CHLORIDE SERPL-SCNC: 93 MMOL/L (ref 96–108)
CO2 SERPL-SCNC: 32 MMOL/L (ref 21–32)
CREAT SERPL-MCNC: 0.57 MG/DL (ref 0.6–1.3)
EOSINOPHIL # BLD AUTO: 0.09 THOUSAND/ÂΜL (ref 0–0.61)
EOSINOPHIL NFR BLD AUTO: 1 % (ref 0–6)
ERYTHROCYTE [DISTWIDTH] IN BLOOD BY AUTOMATED COUNT: 13.5 % (ref 11.6–15.1)
GFR SERPL CREATININE-BSD FRML MDRD: 87 ML/MIN/1.73SQ M
GLUCOSE SERPL-MCNC: 102 MG/DL (ref 65–140)
HCT VFR BLD AUTO: 38.7 % (ref 34.8–46.1)
HGB BLD-MCNC: 12.5 G/DL (ref 11.5–15.4)
IMM GRANULOCYTES # BLD AUTO: 0.03 THOUSAND/UL (ref 0–0.2)
IMM GRANULOCYTES NFR BLD AUTO: 0 % (ref 0–2)
LYMPHOCYTES # BLD AUTO: 2.16 THOUSANDS/ÂΜL (ref 0.6–4.47)
LYMPHOCYTES NFR BLD AUTO: 23 % (ref 14–44)
MCH RBC QN AUTO: 27.4 PG (ref 26.8–34.3)
MCHC RBC AUTO-ENTMCNC: 32.3 G/DL (ref 31.4–37.4)
MCV RBC AUTO: 85 FL (ref 82–98)
MONOCYTES # BLD AUTO: 0.63 THOUSAND/ÂΜL (ref 0.17–1.22)
MONOCYTES NFR BLD AUTO: 7 % (ref 4–12)
NEUTROPHILS # BLD AUTO: 6.55 THOUSANDS/ÂΜL (ref 1.85–7.62)
NEUTS SEG NFR BLD AUTO: 69 % (ref 43–75)
NRBC BLD AUTO-RTO: 0 /100 WBCS
PLATELET # BLD AUTO: 325 THOUSANDS/UL (ref 149–390)
PMV BLD AUTO: 9.2 FL (ref 8.9–12.7)
POTASSIUM SERPL-SCNC: 3.9 MMOL/L (ref 3.5–5.3)
PROT SERPL-MCNC: 7.6 G/DL (ref 6.4–8.4)
RBC # BLD AUTO: 4.57 MILLION/UL (ref 3.81–5.12)
SODIUM SERPL-SCNC: 131 MMOL/L (ref 135–147)
WBC # BLD AUTO: 9.5 THOUSAND/UL (ref 4.31–10.16)

## 2025-07-16 PROCEDURE — 85025 COMPLETE CBC W/AUTO DIFF WBC: CPT

## 2025-07-16 PROCEDURE — 36415 COLL VENOUS BLD VENIPUNCTURE: CPT | Performed by: INTERNAL MEDICINE

## 2025-07-16 PROCEDURE — G0439 PPPS, SUBSEQ VISIT: HCPCS | Performed by: INTERNAL MEDICINE

## 2025-07-16 PROCEDURE — 99214 OFFICE O/P EST MOD 30 MIN: CPT | Performed by: INTERNAL MEDICINE

## 2025-07-16 PROCEDURE — 80053 COMPREHEN METABOLIC PANEL: CPT | Performed by: INTERNAL MEDICINE

## 2025-07-16 PROCEDURE — G2211 COMPLEX E/M VISIT ADD ON: HCPCS | Performed by: INTERNAL MEDICINE

## 2025-07-16 RX ORDER — AMLODIPINE BESYLATE 2.5 MG/1
2.5 TABLET ORAL EVERY MORNING
Qty: 90 TABLET | Refills: 1 | Status: SHIPPED | OUTPATIENT
Start: 2025-07-16 | End: 2025-07-29

## 2025-07-16 RX ORDER — HYDROCHLOROTHIAZIDE 25 MG/1
25 TABLET ORAL EVERY MORNING
Qty: 90 TABLET | Refills: 1 | Status: SHIPPED | OUTPATIENT
Start: 2025-07-16 | End: 2025-07-29 | Stop reason: ALTCHOICE

## 2025-07-16 RX ORDER — LEVOTHYROXINE SODIUM 75 UG/1
75 TABLET ORAL EVERY MORNING
Qty: 90 TABLET | Refills: 0 | Status: SHIPPED | OUTPATIENT
Start: 2025-07-16

## 2025-07-16 RX ORDER — ATORVASTATIN CALCIUM 10 MG/1
10 TABLET, FILM COATED ORAL DAILY
Qty: 90 TABLET | Refills: 1 | Status: SHIPPED | OUTPATIENT
Start: 2025-07-16

## 2025-07-16 NOTE — ASSESSMENT & PLAN NOTE
Reviewed recent hospital course.  Currently doing well, no symptoms.  Repeat CBC, was anemic s/p 1 pRBC.  Orders:  •  Comprehensive metabolic panel

## 2025-07-16 NOTE — ASSESSMENT & PLAN NOTE
On atorvastatin 10 mg.  Orders:  •  atorvastatin (LIPITOR) 10 mg tablet; Take 1 tablet (10 mg total) by mouth daily

## 2025-07-16 NOTE — ASSESSMENT & PLAN NOTE
Lost 13 lbs since surgery.  Reviewed diet, instructed to eat small frequent meals 5 to 6 a day.  May drink Ensure or similar protein supplement.

## 2025-07-16 NOTE — PATIENT INSTRUCTIONS
Medicare Preventive Visit Patient Instructions  Thank you for completing your Welcome to Medicare Visit or Medicare Annual Wellness Visit today. Your next wellness visit will be due in one year (7/17/2026).  The screening/preventive services that you may require over the next 5-10 years are detailed below. Some tests may not apply to you based off risk factors and/or age. Screening tests ordered at today's visit but not completed yet may show as past due. Also, please note that scanned in results may not display below.  Preventive Screenings:  Service Recommendations Previous Testing/Comments   Colorectal Cancer Screening  * Colonoscopy    * Fecal Occult Blood Test (FOBT)/Fecal Immunochemical Test (FIT)  * Fecal DNA/Cologuard Test  * Flexible Sigmoidoscopy Age: 45-75 years old   Colonoscopy: every 10 years (may be performed more frequently if at higher risk)  OR  FOBT/FIT: every 1 year  OR  Cologuard: every 3 years  OR  Sigmoidoscopy: every 5 years  Screening may be recommended earlier than age 45 if at higher risk for colorectal cancer. Also, an individualized decision between you and your healthcare provider will decide whether screening between the ages of 76-85 would be appropriate. Colonoscopy: 03/11/2019  FOBT/FIT: Not on file  Cologuard: Not on file  Sigmoidoscopy: Not on file    History Colorectal Cancer     Breast Cancer Screening Age: 40+ years old  Frequency: every 1-2 years  Not required if history of left and right mastectomy Mammogram: 10/25/2018        Cervical Cancer Screening Between the ages of 21-29, pap smear recommended once every 3 years.   Between the ages of 30-65, can perform pap smear with HPV co-testing every 5 years.   Recommendations may differ for women with a history of total hysterectomy, cervical cancer, or abnormal pap smears in past. Pap Smear: Not on file    Screening Not Indicated   Hepatitis C Screening Once for adults born between 1945 and 1965  More frequently in patients at  high risk for Hepatitis C Hep C Antibody: 05/02/2025    Screening Current   Diabetes Screening 1-2 times per year if you're at risk for diabetes or have pre-diabetes Fasting glucose: 93 mg/dL (4/9/2025)  A1C: 5.7 % (4/9/2025)  Screening Current   Cholesterol Screening Once every 5 years if you don't have a lipid disorder. May order more often based on risk factors. Lipid panel: 04/09/2025    Screening Not Indicated  History Lipid Disorder     Other Preventive Screenings Covered by Medicare:  Abdominal Aortic Aneurysm (AAA) Screening: covered once if your at risk. You're considered to be at risk if you have a family history of AAA.  Lung Cancer Screening: covers low dose CT scan once per year if you meet all of the following conditions: (1) Age 55-77; (2) No signs or symptoms of lung cancer; (3) Current smoker or have quit smoking within the last 15 years; (4) You have a tobacco smoking history of at least 20 pack years (packs per day multiplied by number of years you smoked); (5) You get a written order from a healthcare provider.  Glaucoma Screening: covered annually if you're considered high risk: (1) You have diabetes OR (2) Family history of glaucoma OR (3)  aged 50 and older OR (4)  American aged 65 and older  Osteoporosis Screening: covered every 2 years if you meet one of the following conditions: (1) You're estrogen deficient and at risk for osteoporosis based off medical history and other findings; (2) Have a vertebral abnormality; (3) On glucocorticoid therapy for more than 3 months; (4) Have primary hyperparathyroidism; (5) On osteoporosis medications and need to assess response to drug therapy.   Last bone density test (DXA Scan): 09/01/2018.  HIV Screening: covered annually if you're between the age of 15-65. Also covered annually if you are younger than 15 and older than 65 with risk factors for HIV infection. For pregnant patients, it is covered up to 3 times per  pregnancy.    Immunizations:  Immunization Recommendations   Influenza Vaccine Annual influenza vaccination during flu season is recommended for all persons aged >= 6 months who do not have contraindications   Pneumococcal Vaccine   * Pneumococcal conjugate vaccine = PCV13 (Prevnar 13), PCV15 (Vaxneuvance), PCV20 (Prevnar 20)  * Pneumococcal polysaccharide vaccine = PPSV23 (Pneumovax) Adults 19-63 yo with certain risk factors or if 65+ yo  If never received any pneumonia vaccine: recommend Prevnar 20 (PCV20)  Give PCV20 if previously received 1 dose of PCV13 or PPSV23   Hepatitis B Vaccine 3 dose series if at intermediate or high risk (ex: diabetes, end stage renal disease, liver disease)   Respiratory syncytial virus (RSV) Vaccine - COVERED BY MEDICARE PART D  * RSVPreF3 (Arexvy) CDC recommends that adults 60 years of age and older may receive a single dose of RSV vaccine using shared clinical decision-making (SCDM)   Tetanus (Td) Vaccine - COST NOT COVERED BY MEDICARE PART B Following completion of primary series, a booster dose should be given every 10 years to maintain immunity against tetanus. Td may also be given as tetanus wound prophylaxis.   Tdap Vaccine - COST NOT COVERED BY MEDICARE PART B Recommended at least once for all adults. For pregnant patients, recommended with each pregnancy.   Shingles Vaccine (Shingrix) - COST NOT COVERED BY MEDICARE PART B  2 shot series recommended in those 19 years and older who have or will have weakened immune systems or those 50 years and older     Health Maintenance Due:      Topic Date Due   • Hepatitis C Screening  Completed     Immunizations Due:      Topic Date Due   • COVID-19 Vaccine (8 - 2024-25 season) 04/02/2025   • Influenza Vaccine (1) 09/01/2025     Advance Directives   What are advance directives?  Advance directives are legal documents that state your wishes and plans for medical care. These plans are made ahead of time in case you lose your ability to  make decisions for yourself. Advance directives can apply to any medical decision, such as the treatments you want, and if you want to donate organs.   What are the types of advance directives?  There are many types of advance directives, and each state has rules about how to use them. You may choose a combination of any of the following:  Living will:  This is a written record of the treatment you want. You can also choose which treatments you do not want, which to limit, and which to stop at a certain time. This includes surgery, medicine, IV fluid, and tube feedings.   Durable power of  for healthcare (DPAHC):  This is a written record that states who you want to make healthcare choices for you when you are unable to make them for yourself. This person, called a proxy, is usually a family member or a friend. You may choose more than 1 proxy.  Do not resuscitate (DNR) order:  A DNR order is used in case your heart stops beating or you stop breathing. It is a request not to have certain forms of treatment, such as CPR. A DNR order may be included in other types of advance directives.  Medical directive:  This covers the care that you want if you are in a coma, near death, or unable to make decisions for yourself. You can list the treatments you want for each condition. Treatment may include pain medicine, surgery, blood transfusions, dialysis, IV or tube feedings, and a ventilator (breathing machine).  Values history:  This document has questions about your views, beliefs, and how you feel and think about life. This information can help others choose the care that you would choose.  Why are advance directives important?  An advance directive helps you control your care. Although spoken wishes may be used, it is better to have your wishes written down. Spoken wishes can be misunderstood, or not followed. Treatments may be given even if you do not want them. An advance directive may make it easier for your  family to make difficult choices about your care.   Weight Management   Why it is important to manage your weight:  Being overweight increases your risk of health conditions such as heart disease, high blood pressure, type 2 diabetes, and certain types of cancer. It can also increase your risk for osteoarthritis, sleep apnea, and other respiratory problems. Aim for a slow, steady weight loss. Even a small amount of weight loss can lower your risk of health problems.  How to lose weight safely:  A safe and healthy way to lose weight is to eat fewer calories and get regular exercise. You can lose up about 1 pound a week by decreasing the number of calories you eat by 500 calories each day.   Healthy meal plan for weight management:  A healthy meal plan includes a variety of foods, contains fewer calories, and helps you stay healthy. A healthy meal plan includes the following:  Eat whole-grain foods more often.  A healthy meal plan should contain fiber. Fiber is the part of grains, fruits, and vegetables that is not broken down by your body. Whole-grain foods are healthy and provide extra fiber in your diet. Some examples of whole-grain foods are whole-wheat breads and pastas, oatmeal, brown rice, and bulgur.  Eat a variety of vegetables every day.  Include dark, leafy greens such as spinach, kale, mariann greens, and mustard greens. Eat yellow and orange vegetables such as carrots, sweet potatoes, and winter squash.   Eat a variety of fruits every day.  Choose fresh or canned fruit (canned in its own juice or light syrup) instead of juice. Fruit juice has very little or no fiber.  Eat low-fat dairy foods.  Drink fat-free (skim) milk or 1% milk. Eat fat-free yogurt and low-fat cottage cheese. Try low-fat cheeses such as mozzarella and other reduced-fat cheeses.  Choose meat and other protein foods that are low in fat.  Choose beans or other legumes such as split peas or lentils. Choose fish, skinless poultry (chicken  or turkey), or lean cuts of red meat (beef or pork). Before you cook meat or poultry, cut off any visible fat.   Use less fat and oil.  Try baking foods instead of frying them. Add less fat, such as margarine, sour cream, regular salad dressing and mayonnaise to foods. Eat fewer high-fat foods. Some examples of high-fat foods include french fries, doughnuts, ice cream, and cakes.  Eat fewer sweets.  Limit foods and drinks that are high in sugar. This includes candy, cookies, regular soda, and sweetened drinks.  Exercise:  Exercise at least 30 minutes per day on most days of the week. Some examples of exercise include walking, biking, dancing, and swimming. You can also fit in more physical activity by taking the stairs instead of the elevator or parking farther away from stores. Ask your healthcare provider about the best exercise plan for you.    © Copyright [employer] 2018 Information is for End User's use only and may not be sold, redistributed or otherwise used for commercial purposes. All illustrations and images included in CareNotes® are the copyrighted property of [employer].A[employer]M., Inc. or [employer]

## 2025-07-16 NOTE — ASSESSMENT & PLAN NOTE
Stable.  Orders:  •  levothyroxine 75 mcg tablet; Take 1 tablet (75 mcg total) by mouth every morning

## 2025-07-16 NOTE — PROGRESS NOTES
Name: Monica Avitia      : 1945      MRN: [id number]  Encounter Provider: Jeimy Torres MD  Encounter Date: 2025   Encounter department: [facility] INTERNAL MEDICINE  :  Assessment & Plan  SBO (small bowel obstruction) (HCC)  Reviewed recent hospital course.  Currently doing well, no symptoms.  Repeat CBC, was anemic s/p 1 pRBC.  Orders:  •  Comprehensive metabolic panel    Intra-abdominal abscess (HCC)  Resolved.  As above.       Overweight  Lost 13 lbs since surgery.  Reviewed diet, instructed to eat small frequent meals 5 to 6 a day.  May drink Ensure or similar protein supplement.       Essential hypertension  Taking amlodipine 2.5 mg daily, HCTZ 25 mg daily.   Repeat labs, Na remained low. Consider stopping HCTZ and adjusting amlodipine.  Orders:  •  amLODIPine (NORVASC) 2.5 mg tablet; Take 1 tablet (2.5 mg total) by mouth every morning  •  hydroCHLOROthiazide 25 mg tablet; Take 1 tablet (25 mg total) by mouth every morning    Other hyperlipidemia  On atorvastatin 10 mg.  Orders:  •  atorvastatin (LIPITOR) 10 mg tablet; Take 1 tablet (10 mg total) by mouth daily    Acquired hypothyroidism  Stable.  Orders:  •  levothyroxine 75 mcg tablet; Take 1 tablet (75 mcg total) by mouth every morning    Pulmonary fibrosis, unspecified (HCC)  No symptoms.       Osteopenia of multiple sites  Continue daily walks and supplements.       Medicare annual wellness visit, subsequent          Follow up as scheduled or as needed.    Preventive health issues were discussed with patient, and age appropriate screening tests were ordered as noted in patient's After Visit Summary. Personalized health advice and appropriate referrals for health education or preventive services given if needed, as noted in patient's After Visit Summary.    History of Present Illness     She is feeling well, has recovered since her hospitalized 2 months ago.  She reports no abdominal pain; however, noticed that she is  unable to eat large meals.  She eats several small meals a day.  She moves her bowels regularly, sometimes twice a day.  Stools are usually soft, denies straining.  She had lost a lot of weight since.  She is concerned, would like to gain it back.  She is asking if it is okay to drink a protein drink which she was drinking right after she left the hospital.    Denies any chest pain, shortness of breath, palpitations or other symptoms.       Patient Care Team:  Jeimy Torres MD as PCP - General  Jeimy Torres MD as PCP - PCP-[facility] Attributed-Roster  MD Flakito Lehman MD Sheikh Asim Ali, MD Sinan Kutty, MD as Endoscopist    Review of Systems   Constitutional:  Positive for appetite change. Negative for fatigue.   HENT:  Negative for ear pain.    Respiratory:  Negative for cough and shortness of breath.    Cardiovascular:  Negative for chest pain and leg swelling.   Gastrointestinal:  Negative for abdominal pain, constipation and diarrhea.   Genitourinary:  Negative for dysuria, frequency and urgency.   Musculoskeletal:  Negative for arthralgias and myalgias.   Skin:  Negative for rash and wound.   Neurological:  Negative for dizziness and headaches.   Hematological:  Does not bruise/bleed easily.   Psychiatric/Behavioral:  Negative for confusion. The patient is not nervous/anxious.      Medical History Reviewed by provider this encounter:  Tobacco  Allergies  Meds  Problems  Med Hx  Surg Hx  Fam Hx       Annual Wellness Visit Questionnaire   Monica is here for her Subsequent Wellness visit. Last Medicare Wellness visit information reviewed, patient interviewed and updates made to the record today.      Health Risk Assessment:   Patient rates overall health as very good. Patient feels that their physical health rating is same. Patient is very satisfied with their life. Eyesight was rated as same. Hearing was rated as same. Patient feels that their emotional and  mental health rating is same. Patients states they are never, rarely angry. Patient states they are never, rarely unusually tired/fatigued. Pain experienced in the last 7 days has been none. Patient states that she has experienced weight loss or gain in last 6 months.     Fall Risk Screening:   In the past year, patient has experienced: no history of falling in past year      Urinary Incontinence Screening:   Patient has not leaked urine accidently in the last six months.     Home Safety:  Patient does not have trouble with stairs inside or outside of their home. Patient has working smoke alarms and has working carbon monoxide detector. Home safety hazards include: none.     Nutrition:   Current diet is Regular, Low Cholesterol, Low Saturated Fat, Low Carb and Limited junk food.     Medications:   Patient is currently taking over-the-counter supplements. OTC medications include: see medication list. Patient is able to manage medications.     Activities of Daily Living (ADLs)/Instrumental Activities of Daily Living (IADLs):   Walk and transfer into and out of bed and chair?: Yes  Dress and groom yourself?: Yes    Bathe or shower yourself?: Yes    Feed yourself? Yes  Do your laundry/housekeeping?: Yes  Manage your money, pay your bills and track your expenses?: Yes  Make your own meals?: Yes    Do your own shopping?: Yes    Previous Hospitalizations:   Any hospitalizations or ED visits within the last 12 months?: Yes    How many hospitalizations have you had in the last year?: 1-2    Advance Care Planning:   Living will: Yes    Durable POA for healthcare: Yes    Advanced directive: Yes    End of Life Decisions reviewed with patient: No      Cognitive Screening:   Provider or family/friend/caregiver concerned regarding cognition?: No    Preventive Screenings      Cardiovascular Screening:    General: History Lipid Disorder and Screening Current      Diabetes Screening:     General: Screening Current      Colorectal  Cancer Screening:     General: History Colorectal Cancer and Patient Declines      Breast Cancer Screening:     General: Patient Declines      Cervical Cancer Screening:    General: Screening Not Indicated      Osteoporosis Screening:    General: Patient Declines      Abdominal Aortic Aneurysm (AAA) Screening:        General: Screening Not Indicated      Lung Cancer Screening:     General: Screening Not Indicated      Hepatitis C Screening:    General: Screening Current    Immunizations:  - Immunizations due: Zoster (Shingrix)    Screening, Brief Intervention, and Referral to Treatment (SBIRT)     Screening  Typical number of drinks in a day: 0  Typical number of drinks in a week: 0  Interpretation: Low risk drinking behavior.    Single Item Drug Screening:  How often have you used an illegal drug (including marijuana) or a prescription medication for non-medical reasons in the past year? never    Single Item Drug Screen Score: 0  Interpretation: Negative screen for possible drug use disorder    SDOH Risk Assessment  Social determinants of health (SDOH) risk assesment tool was completed. The tool at a minimum covered housing stability, food insecurity, transportation needs, and utility difficulty. Patient had at risk responses for the following SDOH domains: utilities.     Other Counseling Topics:   Regular weightbearing exercise and calcium and vitamin D intake.     Social Drivers of Health     Financial Resource Strain: Low Risk  (11/7/2022)    Overall Financial Resource Strain (CARDIA)    • Difficulty of Paying Living Expenses: Not hard at all   Food Insecurity: No Food Insecurity (7/16/2025)    Hunger Vital Sign    • Worried About Running Out of Food in the Last Year: Never true    • Ran Out of Food in the Last Year: Never true   Transportation Needs: No Transportation Needs (7/16/2025)    PRAPARE - Transportation    • Lack of Transportation (Medical): No    • Lack of Transportation (Non-Medical): No   Housing  "Stability: Low Risk  (7/16/2025)    Housing Stability Vital Sign    • Unable to Pay for Housing in the Last Year: No    • Number of Times Moved in the Last Year: 0    • Homeless in the Last Year: No   Utilities: Not At Risk (7/16/2025)    [facility] Utilities    • Threatened with loss of utilities: No   Recent Concern: Utilities - At Risk (7/16/2025)    [facility] Utilities    • Threatened with loss of utilities: Yes     No results found.    Objective   /84 (BP Location: Left arm, Patient Position: Sitting, Cuff Size: Standard)   Pulse 80   Temp 98.3 °F (36.8 °C)   Resp 14   Ht 4' 11\" (1.499 m)   Wt 58.8 kg (129 lb 9.6 oz)   SpO2 95%   BMI 26.18 kg/m²     Physical Exam  Vitals and nursing note reviewed.   Constitutional:       General: She is not in acute distress.     Appearance: She is well-developed.   HENT:      Head: Normocephalic and atraumatic.      Right Ear: External ear normal.      Left Ear: External ear normal.      Mouth/Throat:      Mouth: Mucous membranes are moist.     Eyes:      Pupils: Pupils are equal, round, and reactive to light.       Cardiovascular:      Rate and Rhythm: Normal rate and regular rhythm.      Heart sounds: Normal heart sounds.   Pulmonary:      Effort: Pulmonary effort is normal.      Breath sounds: Normal breath sounds. No wheezing.   Abdominal:      General: Bowel sounds are normal.      Palpations: Abdomen is soft.      Tenderness: There is no abdominal tenderness.     Musculoskeletal:         General: No swelling.      Right lower leg: No edema.      Left lower leg: No edema.     Skin:     General: Skin is warm.      Findings: No rash.     Neurological:      General: No focal deficit present.      Mental Status: She is alert and oriented to person, place, and time.     Psychiatric:         Mood and Affect: Mood and affect normal. Mood is not anxious or depressed.         Behavior: Behavior normal.           Labs & imaging results reviewed with patient.    "

## 2025-07-16 NOTE — ASSESSMENT & PLAN NOTE
Taking amlodipine 2.5 mg daily, HCTZ 25 mg daily.   Repeat labs, Na remained low. Consider stopping HCTZ and adjusting amlodipine.  Orders:  •  amLODIPine (NORVASC) 2.5 mg tablet; Take 1 tablet (2.5 mg total) by mouth every morning  •  hydroCHLOROthiazide 25 mg tablet; Take 1 tablet (25 mg total) by mouth every morning

## 2025-07-17 ENCOUNTER — RESULTS FOLLOW-UP (OUTPATIENT)
Dept: INTERNAL MEDICINE CLINIC | Facility: CLINIC | Age: 80
End: 2025-07-17

## 2025-07-17 DIAGNOSIS — I10 ESSENTIAL HYPERTENSION: Primary | ICD-10-CM

## 2025-07-17 NOTE — RESULT ENCOUNTER NOTE
Lab results: your sodium is still low.  I would like to change your HCTZ (water pill, BP medication) since it is causing your sodium to be low. We can stop that and increase the amlodipine to 5 mg.  If okay, let me know. I will recheck labs in a few weeks.    The rest of your labs were normal.

## 2025-07-21 NOTE — TELEPHONE ENCOUNTER
Please call patient.  Check labs next week, to recheck electrolytes. Not fasting.  Please monitor BP daily.

## 2025-07-22 NOTE — TELEPHONE ENCOUNTER
Patient returns office call.  Message below read to the patient.     Check labs next week, to recheck electrolytes. Not fasting.  Please monitor BP daily.

## 2025-07-28 ENCOUNTER — APPOINTMENT (OUTPATIENT)
Dept: LAB | Facility: CLINIC | Age: 80
End: 2025-07-28
Attending: INTERNAL MEDICINE
Payer: COMMERCIAL

## 2025-07-28 DIAGNOSIS — I10 ESSENTIAL HYPERTENSION: ICD-10-CM

## 2025-07-28 LAB
ANION GAP SERPL CALCULATED.3IONS-SCNC: 5 MMOL/L (ref 4–13)
BUN SERPL-MCNC: 18 MG/DL (ref 5–25)
CALCIUM SERPL-MCNC: 9.6 MG/DL (ref 8.4–10.2)
CHLORIDE SERPL-SCNC: 97 MMOL/L (ref 96–108)
CO2 SERPL-SCNC: 31 MMOL/L (ref 21–32)
CREAT SERPL-MCNC: 0.62 MG/DL (ref 0.6–1.3)
GFR SERPL CREATININE-BSD FRML MDRD: 85 ML/MIN/1.73SQ M
GLUCOSE SERPL-MCNC: 109 MG/DL (ref 65–140)
POTASSIUM SERPL-SCNC: 4.3 MMOL/L (ref 3.5–5.3)
SODIUM SERPL-SCNC: 133 MMOL/L (ref 135–147)

## 2025-07-28 PROCEDURE — 80048 BASIC METABOLIC PNL TOTAL CA: CPT

## 2025-07-28 PROCEDURE — 36415 COLL VENOUS BLD VENIPUNCTURE: CPT

## 2025-07-29 DIAGNOSIS — I10 ESSENTIAL HYPERTENSION: Primary | ICD-10-CM

## 2025-07-29 RX ORDER — AMLODIPINE BESYLATE 5 MG/1
5 TABLET ORAL DAILY
Qty: 90 TABLET | Refills: 1 | Status: SHIPPED | OUTPATIENT
Start: 2025-07-29

## (undated) DEVICE — ANTIBACTERIAL UNDYED BRAIDED (POLYGLACTIN 910), SYNTHETIC ABSORBABLE SUTURE: Brand: COATED VICRYL

## (undated) DEVICE — STAPLER WITH DST SERIES TECHNOLOGY: Brand: GIA

## (undated) DEVICE — GLOVE SRG BIOGEL ECLIPSE 7

## (undated) DEVICE — ANTIBACTERIAL UNDYED BRAIDED (POLYGLACTIN 910), SYNTHETIC ABSORBABLE SURGICAL SUTURE: Brand: COATED VICRYL

## (undated) DEVICE — TOWEL SURG XR DETECT GREEN STRL RFD

## (undated) DEVICE — SUT VICRYL PLUS 2-0 54IN VCP286G

## (undated) DEVICE — DECANTER: Brand: UNBRANDED

## (undated) DEVICE — SUT PDS II 3-0 SH 27 IN Z316H

## (undated) DEVICE — STAPLER WITH DST SERIES TECHNOLOGY: Brand: TA

## (undated) DEVICE — PENCILETTE PUSH BUTTON COATED

## (undated) DEVICE — EXOFIN PRECISION PEN HIGH VISCOSITY TOPICAL SKIN ADHESIVE: Brand: EXOFIN PRECISION PEN, 1G

## (undated) DEVICE — NEEDLE 20 G X 1 1/2

## (undated) DEVICE — LOADING UNIT WITH DST SERIES TECHNOLOGY: Brand: GIA

## (undated) DEVICE — INSULATED BLADE ELECTRODE: Brand: EDGE

## (undated) DEVICE — LIGACLIP MCA MULTIPLE CLIP APPLIERS, 20 MEDIUM CLIPS: Brand: LIGACLIP

## (undated) DEVICE — SPONGE 4 X 4 XRAY 16 PLY STRL LF RFD

## (undated) DEVICE — SUT MONOCRYL 4-0 PS-2 27 IN Y426H

## (undated) DEVICE — BETHLEHEM UNIVERSAL MINOR GEN: Brand: CARDINAL HEALTH